# Patient Record
Sex: FEMALE | Race: WHITE | NOT HISPANIC OR LATINO | Employment: OTHER
[De-identification: names, ages, dates, MRNs, and addresses within clinical notes are randomized per-mention and may not be internally consistent; named-entity substitution may affect disease eponyms.]

---

## 2017-01-31 ENCOUNTER — RECORDS - HEALTHEAST (OUTPATIENT)
Dept: ADMINISTRATIVE | Facility: OTHER | Age: 72
End: 2017-01-31

## 2017-02-08 ENCOUNTER — HOSPITAL ENCOUNTER (OUTPATIENT)
Dept: MAMMOGRAPHY | Facility: HOSPITAL | Age: 72
Discharge: HOME | End: 2017-02-08
Attending: FAMILY MEDICINE

## 2017-02-08 DIAGNOSIS — Z12.31 VISIT FOR SCREENING MAMMOGRAM: ICD-10-CM

## 2017-02-09 ENCOUNTER — COMMUNICATION - HEALTHEAST (OUTPATIENT)
Dept: FAMILY MEDICINE | Facility: CLINIC | Age: 72
End: 2017-02-09

## 2017-02-20 ENCOUNTER — COMMUNICATION - HEALTHEAST (OUTPATIENT)
Dept: FAMILY MEDICINE | Facility: CLINIC | Age: 72
End: 2017-02-20

## 2017-03-06 ENCOUNTER — COMMUNICATION - HEALTHEAST (OUTPATIENT)
Dept: FAMILY MEDICINE | Facility: CLINIC | Age: 72
End: 2017-03-06

## 2017-03-06 DIAGNOSIS — Z78.0 ASYMPTOMATIC POSTMENOPAUSAL STATUS: ICD-10-CM

## 2017-03-13 ENCOUNTER — OFFICE VISIT - HEALTHEAST (OUTPATIENT)
Dept: FAMILY MEDICINE | Facility: CLINIC | Age: 72
End: 2017-03-13

## 2017-03-13 DIAGNOSIS — N95.2 POSTMENOPAUSAL ATROPHIC VAGINITIS: ICD-10-CM

## 2017-03-13 DIAGNOSIS — R42 LIGHTHEADEDNESS: ICD-10-CM

## 2017-03-13 DIAGNOSIS — R07.9 CHEST PAIN: ICD-10-CM

## 2017-03-13 DIAGNOSIS — R30.0 BURNING WITH URINATION: ICD-10-CM

## 2017-03-13 LAB
ATRIAL RATE - MUSE: 86 BPM
DIASTOLIC BLOOD PRESSURE - MUSE: NORMAL MMHG
INTERPRETATION ECG - MUSE: NORMAL
P AXIS - MUSE: 31 DEGREES
PR INTERVAL - MUSE: 172 MS
QRS DURATION - MUSE: 92 MS
QT - MUSE: 380 MS
QTC - MUSE: 454 MS
R AXIS - MUSE: 14 DEGREES
SYSTOLIC BLOOD PRESSURE - MUSE: NORMAL MMHG
T AXIS - MUSE: 74 DEGREES
VENTRICULAR RATE- MUSE: 86 BPM

## 2017-03-13 ASSESSMENT — MIFFLIN-ST. JEOR: SCORE: 1389.93

## 2017-03-16 ENCOUNTER — COMMUNICATION - HEALTHEAST (OUTPATIENT)
Dept: FAMILY MEDICINE | Facility: CLINIC | Age: 72
End: 2017-03-16

## 2017-04-04 ENCOUNTER — COMMUNICATION - HEALTHEAST (OUTPATIENT)
Dept: FAMILY MEDICINE | Facility: CLINIC | Age: 72
End: 2017-04-04

## 2017-04-04 DIAGNOSIS — I10 HYPERTENSION: ICD-10-CM

## 2017-04-05 ENCOUNTER — COMMUNICATION - HEALTHEAST (OUTPATIENT)
Dept: FAMILY MEDICINE | Facility: CLINIC | Age: 72
End: 2017-04-05

## 2017-05-30 ENCOUNTER — COMMUNICATION - HEALTHEAST (OUTPATIENT)
Dept: FAMILY MEDICINE | Facility: CLINIC | Age: 72
End: 2017-05-30

## 2017-05-30 DIAGNOSIS — I10 UNSPECIFIED ESSENTIAL HYPERTENSION: ICD-10-CM

## 2017-08-16 ENCOUNTER — OFFICE VISIT - HEALTHEAST (OUTPATIENT)
Dept: FAMILY MEDICINE | Facility: CLINIC | Age: 72
End: 2017-08-16

## 2017-08-16 DIAGNOSIS — L93.1 SUBACUTE CUTANEOUS LUPUS ERYTHEMATOSUS: ICD-10-CM

## 2017-08-16 DIAGNOSIS — E55.9 VITAMIN D DEFICIENCY: ICD-10-CM

## 2017-08-16 DIAGNOSIS — K21.9 GASTROESOPHAGEAL REFLUX DISEASE WITHOUT ESOPHAGITIS: ICD-10-CM

## 2017-08-16 DIAGNOSIS — Z00.00 HEALTHCARE MAINTENANCE: ICD-10-CM

## 2017-08-16 DIAGNOSIS — I10 ESSENTIAL HYPERTENSION WITH GOAL BLOOD PRESSURE LESS THAN 140/90: ICD-10-CM

## 2017-08-16 LAB
CHOLEST SERPL-MCNC: 149 MG/DL
FASTING STATUS PATIENT QL REPORTED: YES
HDLC SERPL-MCNC: 37 MG/DL
LDLC SERPL CALC-MCNC: 95 MG/DL
TRIGL SERPL-MCNC: 85 MG/DL

## 2017-08-16 ASSESSMENT — MIFFLIN-ST. JEOR: SCORE: 1400.81

## 2017-08-17 ENCOUNTER — COMMUNICATION - HEALTHEAST (OUTPATIENT)
Dept: FAMILY MEDICINE | Facility: CLINIC | Age: 72
End: 2017-08-17

## 2017-09-11 ENCOUNTER — COMMUNICATION - HEALTHEAST (OUTPATIENT)
Dept: FAMILY MEDICINE | Facility: CLINIC | Age: 72
End: 2017-09-11

## 2017-09-26 ENCOUNTER — COMMUNICATION - HEALTHEAST (OUTPATIENT)
Dept: FAMILY MEDICINE | Facility: CLINIC | Age: 72
End: 2017-09-26

## 2017-09-26 DIAGNOSIS — I10 HYPERTENSION: ICD-10-CM

## 2017-11-28 ENCOUNTER — COMMUNICATION - HEALTHEAST (OUTPATIENT)
Dept: FAMILY MEDICINE | Facility: CLINIC | Age: 72
End: 2017-11-28

## 2017-11-28 DIAGNOSIS — F41.1 ANXIETY STATE: ICD-10-CM

## 2018-02-13 ENCOUNTER — HOSPITAL ENCOUNTER (OUTPATIENT)
Dept: MAMMOGRAPHY | Facility: HOSPITAL | Age: 73
Discharge: HOME | End: 2018-02-13
Attending: FAMILY MEDICINE

## 2018-02-13 ENCOUNTER — COMMUNICATION - HEALTHEAST (OUTPATIENT)
Dept: FAMILY MEDICINE | Facility: CLINIC | Age: 73
End: 2018-02-13

## 2018-02-13 DIAGNOSIS — Z12.31 VISIT FOR SCREENING MAMMOGRAM: ICD-10-CM

## 2018-06-05 ENCOUNTER — COMMUNICATION - HEALTHEAST (OUTPATIENT)
Dept: FAMILY MEDICINE | Facility: CLINIC | Age: 73
End: 2018-06-05

## 2018-06-05 DIAGNOSIS — I10 ESSENTIAL HYPERTENSION: ICD-10-CM

## 2018-07-05 ENCOUNTER — COMMUNICATION - HEALTHEAST (OUTPATIENT)
Dept: FAMILY MEDICINE | Facility: CLINIC | Age: 73
End: 2018-07-05

## 2018-07-05 DIAGNOSIS — I10 HYPERTENSION: ICD-10-CM

## 2018-08-20 ENCOUNTER — OFFICE VISIT - HEALTHEAST (OUTPATIENT)
Dept: RHEUMATOLOGY | Facility: CLINIC | Age: 73
End: 2018-08-20

## 2018-08-20 DIAGNOSIS — M15.0 PRIMARY OSTEOARTHRITIS INVOLVING MULTIPLE JOINTS: ICD-10-CM

## 2018-08-20 DIAGNOSIS — D84.1 HYPOCOMPLEMENTEMIA (H): ICD-10-CM

## 2018-08-20 DIAGNOSIS — M35.01 SJOGREN'S SYNDROME WITH KERATOCONJUNCTIVITIS SICCA (H): ICD-10-CM

## 2018-08-20 DIAGNOSIS — R76.8 ANA POSITIVE: ICD-10-CM

## 2018-08-20 LAB
ALBUMIN SERPL-MCNC: 3.6 G/DL (ref 3.5–5)
ALT SERPL W P-5'-P-CCNC: 52 U/L (ref 0–45)
BASOPHILS # BLD AUTO: 0 THOU/UL (ref 0–0.2)
BASOPHILS NFR BLD AUTO: 0 % (ref 0–2)
C REACTIVE PROTEIN LHE: <0.1 MG/DL (ref 0–0.8)
C3 SERPL-MCNC: 45 MG/DL (ref 83–177)
C4 SERPL-MCNC: 15 MG/DL (ref 19–59)
CREAT SERPL-MCNC: 0.8 MG/DL (ref 0.6–1.1)
EOSINOPHIL # BLD AUTO: 0.1 THOU/UL (ref 0–0.4)
EOSINOPHIL NFR BLD AUTO: 1 % (ref 0–6)
ERYTHROCYTE [DISTWIDTH] IN BLOOD BY AUTOMATED COUNT: 12.8 % (ref 11–14.5)
ERYTHROCYTE [SEDIMENTATION RATE] IN BLOOD BY WESTERGREN METHOD: 33 MM/HR (ref 0–20)
GFR SERPL CREATININE-BSD FRML MDRD: >60 ML/MIN/1.73M2
HCT VFR BLD AUTO: 36.1 % (ref 35–47)
HGB BLD-MCNC: 12.4 G/DL (ref 12–16)
LYMPHOCYTES # BLD AUTO: 1.1 THOU/UL (ref 0.8–4.4)
LYMPHOCYTES NFR BLD AUTO: 29 % (ref 20–40)
MCH RBC QN AUTO: 30 PG (ref 27–34)
MCHC RBC AUTO-ENTMCNC: 34.4 G/DL (ref 32–36)
MCV RBC AUTO: 87 FL (ref 80–100)
MONOCYTES # BLD AUTO: 0.3 THOU/UL (ref 0–0.9)
MONOCYTES NFR BLD AUTO: 9 % (ref 2–10)
NEUTROPHILS # BLD AUTO: 2.3 THOU/UL (ref 2–7.7)
NEUTROPHILS NFR BLD AUTO: 61 % (ref 50–70)
PLATELET # BLD AUTO: 223 THOU/UL (ref 140–440)
PMV BLD AUTO: 6.5 FL (ref 7–10)
RBC # BLD AUTO: 4.14 MILL/UL (ref 3.8–5.4)
WBC: 3.7 THOU/UL (ref 4–11)

## 2018-08-21 ENCOUNTER — OFFICE VISIT - HEALTHEAST (OUTPATIENT)
Dept: FAMILY MEDICINE | Facility: CLINIC | Age: 73
End: 2018-08-21

## 2018-08-21 DIAGNOSIS — F41.1 ANXIETY STATE: ICD-10-CM

## 2018-08-21 DIAGNOSIS — B37.2 YEAST DERMATITIS: ICD-10-CM

## 2018-08-21 LAB
CCP AB SER IA-ACNC: <0.5 U/ML
DNA (DS) ANTIBODY - HISTORICAL: 7 IU
HBV SURFACE AG SERPL QL IA: NEGATIVE
HCV AB SERPL QL IA: NEGATIVE

## 2018-08-21 ASSESSMENT — MIFFLIN-ST. JEOR: SCORE: 1399

## 2018-10-03 ENCOUNTER — COMMUNICATION - HEALTHEAST (OUTPATIENT)
Dept: FAMILY MEDICINE | Facility: CLINIC | Age: 73
End: 2018-10-03

## 2018-10-03 DIAGNOSIS — I10 HYPERTENSION: ICD-10-CM

## 2018-10-04 ENCOUNTER — COMMUNICATION - HEALTHEAST (OUTPATIENT)
Dept: FAMILY MEDICINE | Facility: CLINIC | Age: 73
End: 2018-10-04

## 2018-10-24 ENCOUNTER — OFFICE VISIT - HEALTHEAST (OUTPATIENT)
Dept: RHEUMATOLOGY | Facility: CLINIC | Age: 73
End: 2018-10-24

## 2018-10-24 DIAGNOSIS — M35.01 SJOGREN'S SYNDROME WITH KERATOCONJUNCTIVITIS SICCA (H): ICD-10-CM

## 2018-10-24 DIAGNOSIS — M15.0 PRIMARY OSTEOARTHRITIS INVOLVING MULTIPLE JOINTS: ICD-10-CM

## 2018-10-24 DIAGNOSIS — M19.049 LOCALIZED PRIMARY CARPOMETACARPAL OSTEOARTHROSIS, UNSPECIFIED LATERALITY: ICD-10-CM

## 2018-10-24 DIAGNOSIS — R76.8 ANA POSITIVE: ICD-10-CM

## 2018-10-24 ASSESSMENT — MIFFLIN-ST. JEOR: SCORE: 1394.46

## 2019-02-06 ENCOUNTER — OFFICE VISIT - HEALTHEAST (OUTPATIENT)
Dept: RHEUMATOLOGY | Facility: CLINIC | Age: 74
End: 2019-02-06

## 2019-02-06 DIAGNOSIS — M35.01 SJOGREN'S SYNDROME WITH KERATOCONJUNCTIVITIS SICCA (H): ICD-10-CM

## 2019-02-06 DIAGNOSIS — M15.0 PRIMARY OSTEOARTHRITIS INVOLVING MULTIPLE JOINTS: ICD-10-CM

## 2019-02-06 DIAGNOSIS — D84.1 HYPOCOMPLEMENTEMIA (H): ICD-10-CM

## 2019-02-06 DIAGNOSIS — R76.8 ANA POSITIVE: ICD-10-CM

## 2019-04-05 ENCOUNTER — HOSPITAL ENCOUNTER (OUTPATIENT)
Dept: MAMMOGRAPHY | Facility: CLINIC | Age: 74
Discharge: HOME | End: 2019-04-05
Attending: FAMILY MEDICINE

## 2019-04-05 DIAGNOSIS — Z12.31 VISIT FOR SCREENING MAMMOGRAM: ICD-10-CM

## 2019-05-07 ENCOUNTER — OFFICE VISIT - HEALTHEAST (OUTPATIENT)
Dept: RHEUMATOLOGY | Facility: CLINIC | Age: 74
End: 2019-05-07

## 2019-05-07 DIAGNOSIS — M71.9 DISORDER OF BURSAE AND TENDONS IN SHOULDER REGION: ICD-10-CM

## 2019-05-07 DIAGNOSIS — M35.01 SJOGREN'S SYNDROME WITH KERATOCONJUNCTIVITIS SICCA (H): ICD-10-CM

## 2019-05-07 DIAGNOSIS — M67.919 DISORDER OF BURSAE AND TENDONS IN SHOULDER REGION: ICD-10-CM

## 2019-05-07 DIAGNOSIS — R76.8 ANA POSITIVE: ICD-10-CM

## 2019-05-07 DIAGNOSIS — M15.0 PRIMARY OSTEOARTHRITIS INVOLVING MULTIPLE JOINTS: ICD-10-CM

## 2019-05-28 ENCOUNTER — COMMUNICATION - HEALTHEAST (OUTPATIENT)
Dept: FAMILY MEDICINE | Facility: CLINIC | Age: 74
End: 2019-05-28

## 2019-05-28 DIAGNOSIS — I10 ESSENTIAL HYPERTENSION: ICD-10-CM

## 2019-07-15 ENCOUNTER — COMMUNICATION - HEALTHEAST (OUTPATIENT)
Dept: RHEUMATOLOGY | Facility: CLINIC | Age: 74
End: 2019-07-15

## 2019-07-15 DIAGNOSIS — M35.01 SJOGREN'S SYNDROME WITH KERATOCONJUNCTIVITIS SICCA (H): ICD-10-CM

## 2019-07-27 ENCOUNTER — COMMUNICATION - HEALTHEAST (OUTPATIENT)
Dept: RHEUMATOLOGY | Facility: CLINIC | Age: 74
End: 2019-07-27

## 2019-07-27 DIAGNOSIS — M35.01 SJOGREN'S SYNDROME WITH KERATOCONJUNCTIVITIS SICCA (H): ICD-10-CM

## 2019-07-29 ENCOUNTER — COMMUNICATION - HEALTHEAST (OUTPATIENT)
Dept: SCHEDULING | Facility: CLINIC | Age: 74
End: 2019-07-29

## 2019-07-30 ENCOUNTER — AMBULATORY - HEALTHEAST (OUTPATIENT)
Dept: FAMILY MEDICINE | Facility: CLINIC | Age: 74
End: 2019-07-30

## 2019-07-30 ENCOUNTER — OFFICE VISIT - HEALTHEAST (OUTPATIENT)
Dept: FAMILY MEDICINE | Facility: CLINIC | Age: 74
End: 2019-07-30

## 2019-07-30 DIAGNOSIS — R07.9 CHEST PAIN: ICD-10-CM

## 2019-07-30 DIAGNOSIS — R06.09 OTHER FORMS OF DYSPNEA: ICD-10-CM

## 2019-07-30 DIAGNOSIS — R07.89 ATYPICAL CHEST PAIN: ICD-10-CM

## 2019-07-30 DIAGNOSIS — R06.09 DOE (DYSPNEA ON EXERTION): ICD-10-CM

## 2019-07-30 LAB
ALBUMIN SERPL-MCNC: 3.5 G/DL (ref 3.5–5)
ALP SERPL-CCNC: 90 U/L (ref 45–120)
ALT SERPL W P-5'-P-CCNC: 82 U/L (ref 0–45)
ANION GAP SERPL CALCULATED.3IONS-SCNC: 7 MMOL/L (ref 5–18)
AST SERPL W P-5'-P-CCNC: 85 U/L (ref 0–40)
ATRIAL RATE - MUSE: 86 BPM
BILIRUB SERPL-MCNC: 0.6 MG/DL (ref 0–1)
BUN SERPL-MCNC: 6 MG/DL (ref 8–28)
CALCIUM SERPL-MCNC: 9.1 MG/DL (ref 8.5–10.5)
CHLORIDE BLD-SCNC: 101 MMOL/L (ref 98–107)
CO2 SERPL-SCNC: 25 MMOL/L (ref 22–31)
CREAT SERPL-MCNC: 0.88 MG/DL (ref 0.6–1.1)
DIASTOLIC BLOOD PRESSURE - MUSE: NORMAL MMHG
ERYTHROCYTE [DISTWIDTH] IN BLOOD BY AUTOMATED COUNT: 13.4 % (ref 11–14.5)
GFR SERPL CREATININE-BSD FRML MDRD: >60 ML/MIN/1.73M2
GLUCOSE BLD-MCNC: 100 MG/DL (ref 70–125)
HCT VFR BLD AUTO: 38.3 % (ref 35–47)
HGB BLD-MCNC: 12.8 G/DL (ref 12–16)
INTERPRETATION ECG - MUSE: NORMAL
LIPASE SERPL-CCNC: 31 U/L (ref 0–52)
MCH RBC QN AUTO: 30.2 PG (ref 27–34)
MCHC RBC AUTO-ENTMCNC: 33.5 G/DL (ref 32–36)
MCV RBC AUTO: 90 FL (ref 80–100)
P AXIS - MUSE: 16 DEGREES
PLATELET # BLD AUTO: 219 THOU/UL (ref 140–440)
PMV BLD AUTO: 6.9 FL (ref 7–10)
POTASSIUM BLD-SCNC: 4.2 MMOL/L (ref 3.5–5)
PR INTERVAL - MUSE: 168 MS
PROT SERPL-MCNC: 7.6 G/DL (ref 6–8)
QRS DURATION - MUSE: 86 MS
QT - MUSE: 370 MS
QTC - MUSE: 442 MS
R AXIS - MUSE: 24 DEGREES
RBC # BLD AUTO: 4.25 MILL/UL (ref 3.8–5.4)
SODIUM SERPL-SCNC: 133 MMOL/L (ref 136–145)
SYSTOLIC BLOOD PRESSURE - MUSE: NORMAL MMHG
T AXIS - MUSE: 65 DEGREES
VENTRICULAR RATE- MUSE: 86 BPM
WBC: 4.7 THOU/UL (ref 4–11)

## 2019-08-02 ENCOUNTER — COMMUNICATION - HEALTHEAST (OUTPATIENT)
Dept: FAMILY MEDICINE | Facility: CLINIC | Age: 74
End: 2019-08-02

## 2019-08-13 ENCOUNTER — HOSPITAL ENCOUNTER (OUTPATIENT)
Dept: CARDIOLOGY | Facility: HOSPITAL | Age: 74
Discharge: HOME | End: 2019-08-13
Attending: FAMILY MEDICINE

## 2019-08-13 ENCOUNTER — AMBULATORY - HEALTHEAST (OUTPATIENT)
Dept: FAMILY MEDICINE | Facility: CLINIC | Age: 74
End: 2019-08-13

## 2019-08-13 ENCOUNTER — HOSPITAL ENCOUNTER (OUTPATIENT)
Dept: NUCLEAR MEDICINE | Facility: HOSPITAL | Age: 74
Discharge: HOME | End: 2019-08-13
Attending: FAMILY MEDICINE

## 2019-08-13 DIAGNOSIS — R07.89 ATYPICAL CHEST PAIN: ICD-10-CM

## 2019-08-13 DIAGNOSIS — R94.39 ABNORMAL STRESS TEST: ICD-10-CM

## 2019-08-13 DIAGNOSIS — R06.09 DOE (DYSPNEA ON EXERTION): ICD-10-CM

## 2019-08-13 DIAGNOSIS — R06.09 OTHER FORMS OF DYSPNEA: ICD-10-CM

## 2019-08-13 DIAGNOSIS — R07.9 CHEST PAIN: ICD-10-CM

## 2019-08-13 LAB
CV STRESS CURRENT BP HE: NORMAL
CV STRESS CURRENT HR HE: 102
CV STRESS CURRENT HR HE: 102
CV STRESS CURRENT HR HE: 103
CV STRESS CURRENT HR HE: 104
CV STRESS CURRENT HR HE: 109
CV STRESS CURRENT HR HE: 110
CV STRESS CURRENT HR HE: 110
CV STRESS CURRENT HR HE: 115
CV STRESS CURRENT HR HE: 115
CV STRESS CURRENT HR HE: 118
CV STRESS CURRENT HR HE: 120
CV STRESS CURRENT HR HE: 120
CV STRESS CURRENT HR HE: 91
CV STRESS CURRENT HR HE: 94
CV STRESS CURRENT HR HE: 95
CV STRESS CURRENT HR HE: 96
CV STRESS CURRENT HR HE: 98
CV STRESS DEVIATION TIME HE: NORMAL
CV STRESS ECHO PERCENT HR HE: NORMAL
CV STRESS EXERCISE STAGE HE: NORMAL
CV STRESS FINAL RESTING BP HE: NORMAL
CV STRESS FINAL RESTING HR HE: 94
CV STRESS MAX HR HE: 121
CV STRESS MAX TREADMILL GRADE HE: 0
CV STRESS MAX TREADMILL SPEED HE: 0
CV STRESS PEAK DIA BP HE: NORMAL
CV STRESS PEAK SYS BP HE: NORMAL
CV STRESS PHASE HE: NORMAL
CV STRESS PROTOCOL HE: NORMAL
CV STRESS RESTING PT POSITION HE: NORMAL
CV STRESS ST DEVIATION AMOUNT HE: NORMAL
CV STRESS ST DEVIATION ELEVATION HE: NORMAL
CV STRESS ST EVELATION AMOUNT HE: NORMAL
CV STRESS TEST TYPE HE: NORMAL
CV STRESS TOTAL STAGE TIME MIN 1 HE: NORMAL
NUC STRESS EJECTION FRACTION: 72 %
STRESS ECHO BASELINE BP: NORMAL
STRESS ECHO BASELINE HR: 90
STRESS ECHO CALCULATED PERCENT HR: 82 %
STRESS ECHO LAST STRESS BP: NORMAL
STRESS ECHO LAST STRESS HR: 115

## 2019-08-15 ENCOUNTER — AMBULATORY - HEALTHEAST (OUTPATIENT)
Dept: CARDIOLOGY | Facility: CLINIC | Age: 74
End: 2019-08-15

## 2019-08-15 ENCOUNTER — OFFICE VISIT - HEALTHEAST (OUTPATIENT)
Dept: CARDIOLOGY | Facility: CLINIC | Age: 74
End: 2019-08-15

## 2019-08-15 DIAGNOSIS — I10 ESSENTIAL HYPERTENSION: ICD-10-CM

## 2019-08-15 DIAGNOSIS — R07.9 CHEST PAIN, UNSPECIFIED TYPE: ICD-10-CM

## 2019-08-15 DIAGNOSIS — R94.39 ABNORMAL NUCLEAR STRESS TEST: ICD-10-CM

## 2019-08-15 ASSESSMENT — MIFFLIN-ST. JEOR: SCORE: 1412.61

## 2019-08-19 ENCOUNTER — SURGERY - HEALTHEAST (OUTPATIENT)
Dept: CARDIOLOGY | Facility: CLINIC | Age: 74
End: 2019-08-19

## 2019-08-22 ENCOUNTER — SURGERY - HEALTHEAST (OUTPATIENT)
Dept: CARDIOLOGY | Facility: CLINIC | Age: 74
End: 2019-08-22

## 2019-08-22 ASSESSMENT — MIFFLIN-ST. JEOR: SCORE: 1413.97

## 2019-08-24 ENCOUNTER — COMMUNICATION - HEALTHEAST (OUTPATIENT)
Dept: FAMILY MEDICINE | Facility: CLINIC | Age: 74
End: 2019-08-24

## 2019-08-24 DIAGNOSIS — F41.1 ANXIETY STATE: ICD-10-CM

## 2019-09-07 ENCOUNTER — COMMUNICATION - HEALTHEAST (OUTPATIENT)
Dept: FAMILY MEDICINE | Facility: CLINIC | Age: 74
End: 2019-09-07

## 2019-09-07 DIAGNOSIS — I10 ESSENTIAL HYPERTENSION: ICD-10-CM

## 2019-09-23 ENCOUNTER — OFFICE VISIT - HEALTHEAST (OUTPATIENT)
Dept: CARDIOLOGY | Facility: CLINIC | Age: 74
End: 2019-09-23

## 2019-09-23 DIAGNOSIS — R94.39 ABNORMAL NUCLEAR STRESS TEST: ICD-10-CM

## 2019-09-23 DIAGNOSIS — I10 ESSENTIAL HYPERTENSION: ICD-10-CM

## 2019-09-23 DIAGNOSIS — I25.83 CORONARY ARTERY DISEASE DUE TO LIPID RICH PLAQUE: ICD-10-CM

## 2019-09-23 DIAGNOSIS — I25.10 CORONARY ARTERY DISEASE DUE TO LIPID RICH PLAQUE: ICD-10-CM

## 2019-09-23 ASSESSMENT — MIFFLIN-ST. JEOR: SCORE: 1421.68

## 2019-09-24 ENCOUNTER — COMMUNICATION - HEALTHEAST (OUTPATIENT)
Dept: FAMILY MEDICINE | Facility: CLINIC | Age: 74
End: 2019-09-24

## 2019-09-24 DIAGNOSIS — I10 HYPERTENSION: ICD-10-CM

## 2019-10-15 ENCOUNTER — COMMUNICATION - HEALTHEAST (OUTPATIENT)
Dept: RHEUMATOLOGY | Facility: CLINIC | Age: 74
End: 2019-10-15

## 2019-10-15 DIAGNOSIS — M35.01 SJOGREN'S SYNDROME WITH KERATOCONJUNCTIVITIS SICCA (H): ICD-10-CM

## 2019-12-09 ENCOUNTER — COMMUNICATION - HEALTHEAST (OUTPATIENT)
Dept: ADMINISTRATIVE | Facility: CLINIC | Age: 74
End: 2019-12-09

## 2019-12-09 DIAGNOSIS — M35.01 SJOGREN'S SYNDROME WITH KERATOCONJUNCTIVITIS SICCA (H): ICD-10-CM

## 2020-02-06 ENCOUNTER — COMMUNICATION - HEALTHEAST (OUTPATIENT)
Dept: RHEUMATOLOGY | Facility: CLINIC | Age: 75
End: 2020-02-06

## 2020-02-06 ENCOUNTER — OFFICE VISIT - HEALTHEAST (OUTPATIENT)
Dept: RHEUMATOLOGY | Facility: CLINIC | Age: 75
End: 2020-02-06

## 2020-02-06 DIAGNOSIS — M15.0 PRIMARY OSTEOARTHRITIS INVOLVING MULTIPLE JOINTS: ICD-10-CM

## 2020-02-06 DIAGNOSIS — R76.8 ANA POSITIVE: ICD-10-CM

## 2020-02-06 DIAGNOSIS — M35.01 SJOGREN'S SYNDROME WITH KERATOCONJUNCTIVITIS SICCA (H): ICD-10-CM

## 2020-02-06 DIAGNOSIS — M65.321 TRIGGER FINGER, RIGHT INDEX FINGER: ICD-10-CM

## 2020-02-06 ASSESSMENT — MIFFLIN-ST. JEOR: SCORE: 1421.68

## 2020-02-11 ENCOUNTER — RECORDS - HEALTHEAST (OUTPATIENT)
Dept: ADMINISTRATIVE | Facility: OTHER | Age: 75
End: 2020-02-11

## 2020-03-11 ENCOUNTER — HEALTH MAINTENANCE LETTER (OUTPATIENT)
Age: 75
End: 2020-03-11

## 2020-04-10 ENCOUNTER — COMMUNICATION - HEALTHEAST (OUTPATIENT)
Dept: RHEUMATOLOGY | Facility: CLINIC | Age: 75
End: 2020-04-10

## 2020-04-10 DIAGNOSIS — M35.01 SJOGREN'S SYNDROME WITH KERATOCONJUNCTIVITIS SICCA (H): ICD-10-CM

## 2020-06-05 ENCOUNTER — COMMUNICATION - HEALTHEAST (OUTPATIENT)
Dept: RHEUMATOLOGY | Facility: CLINIC | Age: 75
End: 2020-06-05

## 2020-06-05 DIAGNOSIS — M35.01 SJOGREN'S SYNDROME WITH KERATOCONJUNCTIVITIS SICCA (H): ICD-10-CM

## 2020-08-04 ENCOUNTER — COMMUNICATION - HEALTHEAST (OUTPATIENT)
Dept: RHEUMATOLOGY | Facility: CLINIC | Age: 75
End: 2020-08-04

## 2020-08-04 DIAGNOSIS — M35.01 SJOGREN'S SYNDROME WITH KERATOCONJUNCTIVITIS SICCA (H): ICD-10-CM

## 2020-08-10 ENCOUNTER — OFFICE VISIT - HEALTHEAST (OUTPATIENT)
Dept: FAMILY MEDICINE | Facility: CLINIC | Age: 75
End: 2020-08-10

## 2020-08-10 DIAGNOSIS — M35.01 SJOGREN'S SYNDROME WITH KERATOCONJUNCTIVITIS SICCA (H): ICD-10-CM

## 2020-08-10 DIAGNOSIS — R10.13 EPIGASTRIC ABDOMINAL PAIN: ICD-10-CM

## 2020-08-10 LAB
ALBUMIN SERPL-MCNC: 3.4 G/DL (ref 3.5–5)
ALP SERPL-CCNC: 87 U/L (ref 45–120)
ALT SERPL W P-5'-P-CCNC: 113 U/L (ref 0–45)
ANION GAP SERPL CALCULATED.3IONS-SCNC: 8 MMOL/L (ref 5–18)
AST SERPL W P-5'-P-CCNC: 124 U/L (ref 0–40)
BASOPHILS # BLD AUTO: 0 THOU/UL (ref 0–0.2)
BASOPHILS NFR BLD AUTO: 1 % (ref 0–2)
BILIRUB SERPL-MCNC: 1 MG/DL (ref 0–1)
BUN SERPL-MCNC: 5 MG/DL (ref 8–28)
CALCIUM SERPL-MCNC: 8.7 MG/DL (ref 8.5–10.5)
CHLORIDE BLD-SCNC: 100 MMOL/L (ref 98–107)
CO2 SERPL-SCNC: 24 MMOL/L (ref 22–31)
CREAT SERPL-MCNC: 0.84 MG/DL (ref 0.6–1.1)
EOSINOPHIL # BLD AUTO: 0.1 THOU/UL (ref 0–0.4)
EOSINOPHIL NFR BLD AUTO: 2 % (ref 0–6)
ERYTHROCYTE [DISTWIDTH] IN BLOOD BY AUTOMATED COUNT: 11.8 % (ref 11–14.5)
GFR SERPL CREATININE-BSD FRML MDRD: >60 ML/MIN/1.73M2
GLUCOSE BLD-MCNC: 91 MG/DL (ref 70–125)
HCT VFR BLD AUTO: 38.2 % (ref 35–47)
HGB BLD-MCNC: 12.8 G/DL (ref 12–16)
LIPASE SERPL-CCNC: 44 U/L (ref 0–52)
LYMPHOCYTES # BLD AUTO: 1.2 THOU/UL (ref 0.8–4.4)
LYMPHOCYTES NFR BLD AUTO: 28 % (ref 20–40)
MCH RBC QN AUTO: 30.7 PG (ref 27–34)
MCHC RBC AUTO-ENTMCNC: 33.5 G/DL (ref 32–36)
MCV RBC AUTO: 91 FL (ref 80–100)
MONOCYTES # BLD AUTO: 0.4 THOU/UL (ref 0–0.9)
MONOCYTES NFR BLD AUTO: 10 % (ref 2–10)
NEUTROPHILS # BLD AUTO: 2.5 THOU/UL (ref 2–7.7)
NEUTROPHILS NFR BLD AUTO: 59 % (ref 50–70)
PLATELET # BLD AUTO: 202 THOU/UL (ref 140–440)
PMV BLD AUTO: 6.5 FL (ref 7–10)
POTASSIUM BLD-SCNC: 3.8 MMOL/L (ref 3.5–5)
PROT SERPL-MCNC: 7.8 G/DL (ref 6–8)
RBC # BLD AUTO: 4.17 MILL/UL (ref 3.8–5.4)
SODIUM SERPL-SCNC: 132 MMOL/L (ref 136–145)
WBC: 4.2 THOU/UL (ref 4–11)

## 2020-08-18 ENCOUNTER — COMMUNICATION - HEALTHEAST (OUTPATIENT)
Dept: LAB | Facility: CLINIC | Age: 75
End: 2020-08-18

## 2020-08-18 DIAGNOSIS — R79.89 ELEVATED LFTS: ICD-10-CM

## 2020-08-23 ENCOUNTER — COMMUNICATION - HEALTHEAST (OUTPATIENT)
Dept: FAMILY MEDICINE | Facility: CLINIC | Age: 75
End: 2020-08-23

## 2020-08-23 DIAGNOSIS — F41.1 ANXIETY STATE: ICD-10-CM

## 2020-08-24 ENCOUNTER — AMBULATORY - HEALTHEAST (OUTPATIENT)
Dept: LAB | Facility: CLINIC | Age: 75
End: 2020-08-24

## 2020-08-24 DIAGNOSIS — R79.89 ELEVATED LFTS: ICD-10-CM

## 2020-08-24 LAB
ALBUMIN SERPL-MCNC: 3.3 G/DL (ref 3.5–5)
ALP SERPL-CCNC: 81 U/L (ref 45–120)
ALT SERPL W P-5'-P-CCNC: 80 U/L (ref 0–45)
ANION GAP SERPL CALCULATED.3IONS-SCNC: 7 MMOL/L (ref 5–18)
AST SERPL W P-5'-P-CCNC: 96 U/L (ref 0–40)
BILIRUB SERPL-MCNC: 0.9 MG/DL (ref 0–1)
BUN SERPL-MCNC: 5 MG/DL (ref 8–28)
CALCIUM SERPL-MCNC: 8.4 MG/DL (ref 8.5–10.5)
CHLORIDE BLD-SCNC: 104 MMOL/L (ref 98–107)
CO2 SERPL-SCNC: 25 MMOL/L (ref 22–31)
CREAT SERPL-MCNC: 0.86 MG/DL (ref 0.6–1.1)
GFR SERPL CREATININE-BSD FRML MDRD: >60 ML/MIN/1.73M2
GLUCOSE BLD-MCNC: 120 MG/DL (ref 70–125)
POTASSIUM BLD-SCNC: 3.8 MMOL/L (ref 3.5–5)
PROT SERPL-MCNC: 7.6 G/DL (ref 6–8)
SODIUM SERPL-SCNC: 136 MMOL/L (ref 136–145)

## 2020-08-25 RX ORDER — CITALOPRAM HYDROBROMIDE 20 MG/1
TABLET ORAL
Qty: 135 TABLET | Refills: 3 | Status: SHIPPED | OUTPATIENT
Start: 2020-08-25 | End: 2021-10-27

## 2020-08-30 ENCOUNTER — COMMUNICATION - HEALTHEAST (OUTPATIENT)
Dept: FAMILY MEDICINE | Facility: CLINIC | Age: 75
End: 2020-08-30

## 2020-08-30 DIAGNOSIS — I10 ESSENTIAL HYPERTENSION: ICD-10-CM

## 2020-09-20 ENCOUNTER — COMMUNICATION - HEALTHEAST (OUTPATIENT)
Dept: FAMILY MEDICINE | Facility: CLINIC | Age: 75
End: 2020-09-20

## 2020-09-20 DIAGNOSIS — I10 HYPERTENSION: ICD-10-CM

## 2020-09-21 RX ORDER — LOSARTAN POTASSIUM 100 MG/1
TABLET ORAL
Qty: 90 TABLET | Refills: 3 | Status: SHIPPED | OUTPATIENT
Start: 2020-09-21 | End: 2021-07-20

## 2020-09-29 ENCOUNTER — COMMUNICATION - HEALTHEAST (OUTPATIENT)
Dept: FAMILY MEDICINE | Facility: CLINIC | Age: 75
End: 2020-09-29

## 2020-09-29 DIAGNOSIS — R10.13 EPIGASTRIC ABDOMINAL PAIN: ICD-10-CM

## 2020-09-30 ENCOUNTER — COMMUNICATION - HEALTHEAST (OUTPATIENT)
Dept: RHEUMATOLOGY | Facility: CLINIC | Age: 75
End: 2020-09-30

## 2020-09-30 DIAGNOSIS — M35.01 SJOGREN'S SYNDROME WITH KERATOCONJUNCTIVITIS SICCA (H): ICD-10-CM

## 2020-10-01 ENCOUNTER — COMMUNICATION - HEALTHEAST (OUTPATIENT)
Dept: FAMILY MEDICINE | Facility: CLINIC | Age: 75
End: 2020-10-01

## 2020-10-09 ENCOUNTER — RECORDS - HEALTHEAST (OUTPATIENT)
Dept: ADMINISTRATIVE | Facility: OTHER | Age: 75
End: 2020-10-09

## 2020-10-20 ENCOUNTER — OFFICE VISIT - HEALTHEAST (OUTPATIENT)
Dept: RHEUMATOLOGY | Facility: CLINIC | Age: 75
End: 2020-10-20

## 2020-10-20 DIAGNOSIS — Z78.0 POST-MENOPAUSAL: ICD-10-CM

## 2020-10-20 DIAGNOSIS — R74.01 TRANSAMINITIS: ICD-10-CM

## 2020-10-20 DIAGNOSIS — M35.01 SJOGREN'S SYNDROME WITH KERATOCONJUNCTIVITIS SICCA (H): ICD-10-CM

## 2020-10-20 DIAGNOSIS — M79.641 CHRONIC PAIN OF RIGHT HAND: ICD-10-CM

## 2020-10-20 DIAGNOSIS — G89.29 CHRONIC PAIN OF RIGHT HAND: ICD-10-CM

## 2020-10-21 ENCOUNTER — AMBULATORY - HEALTHEAST (OUTPATIENT)
Dept: LAB | Facility: HOSPITAL | Age: 75
End: 2020-10-21

## 2020-10-21 DIAGNOSIS — M79.641 CHRONIC PAIN OF RIGHT HAND: ICD-10-CM

## 2020-10-21 DIAGNOSIS — R74.01 TRANSAMINITIS: ICD-10-CM

## 2020-10-21 DIAGNOSIS — M35.01 SJOGREN'S SYNDROME WITH KERATOCONJUNCTIVITIS SICCA (H): ICD-10-CM

## 2020-10-21 DIAGNOSIS — G89.29 CHRONIC PAIN OF RIGHT HAND: ICD-10-CM

## 2020-10-21 LAB
ALBUMIN SERPL-MCNC: 3.4 G/DL (ref 3.5–5)
ALT SERPL W P-5'-P-CCNC: 104 U/L (ref 0–45)
AST SERPL W P-5'-P-CCNC: 128 U/L (ref 0–40)
BASOPHILS # BLD AUTO: 0 THOU/UL (ref 0–0.2)
BASOPHILS NFR BLD AUTO: 1 % (ref 0–2)
C REACTIVE PROTEIN LHE: <0.1 MG/DL (ref 0–0.8)
C3 SERPL-MCNC: 57 MG/DL (ref 83–177)
C4 SERPL-MCNC: 16 MG/DL (ref 19–59)
CREAT SERPL-MCNC: 0.82 MG/DL (ref 0.6–1.1)
CREAT UR-MCNC: 56.4 MG/DL
EOSINOPHIL # BLD AUTO: 0.1 THOU/UL (ref 0–0.4)
EOSINOPHIL NFR BLD AUTO: 1 % (ref 0–6)
ERYTHROCYTE [DISTWIDTH] IN BLOOD BY AUTOMATED COUNT: 13.5 % (ref 11–14.5)
ERYTHROCYTE [SEDIMENTATION RATE] IN BLOOD BY WESTERGREN METHOD: 30 MM/HR (ref 0–20)
GFR SERPL CREATININE-BSD FRML MDRD: >60 ML/MIN/1.73M2
HCT VFR BLD AUTO: 37.6 % (ref 35–47)
HGB BLD-MCNC: 12.6 G/DL (ref 12–16)
IMM GRANULOCYTES # BLD: 0 THOU/UL
IMM GRANULOCYTES NFR BLD: 0 %
LYMPHOCYTES # BLD AUTO: 1.4 THOU/UL (ref 0.8–4.4)
LYMPHOCYTES NFR BLD AUTO: 25 % (ref 20–40)
MCH RBC QN AUTO: 30.9 PG (ref 27–34)
MCHC RBC AUTO-ENTMCNC: 33.5 G/DL (ref 32–36)
MCV RBC AUTO: 92 FL (ref 80–100)
MICROALBUMIN UR-MCNC: 1.18 MG/DL (ref 0–1.99)
MICROALBUMIN/CREAT UR: 20.9 MG/G
MONOCYTES # BLD AUTO: 0.5 THOU/UL (ref 0–0.9)
MONOCYTES NFR BLD AUTO: 10 % (ref 2–10)
NEUTROPHILS # BLD AUTO: 3.4 THOU/UL (ref 2–7.7)
NEUTROPHILS NFR BLD AUTO: 63 % (ref 50–70)
PLATELET # BLD AUTO: 197 THOU/UL (ref 140–440)
PMV BLD AUTO: 8.4 FL (ref 8.5–12.5)
RBC # BLD AUTO: 4.08 MILL/UL (ref 3.8–5.4)
URATE SERPL-MCNC: 3.3 MG/DL (ref 2–7.5)
WBC: 5.4 THOU/UL (ref 4–11)

## 2020-10-22 ENCOUNTER — TRANSFERRED RECORDS (OUTPATIENT)
Dept: HEALTH INFORMATION MANAGEMENT | Facility: CLINIC | Age: 75
End: 2020-10-22

## 2020-10-22 ENCOUNTER — RECORDS - HEALTHEAST (OUTPATIENT)
Dept: ADMINISTRATIVE | Facility: OTHER | Age: 75
End: 2020-10-22

## 2020-10-30 ENCOUNTER — TRANSFERRED RECORDS (OUTPATIENT)
Dept: HEALTH INFORMATION MANAGEMENT | Facility: CLINIC | Age: 75
End: 2020-10-30

## 2020-11-04 ENCOUNTER — RECORDS - HEALTHEAST (OUTPATIENT)
Dept: ADMINISTRATIVE | Facility: OTHER | Age: 75
End: 2020-11-04

## 2020-11-04 ENCOUNTER — COMMUNICATION - HEALTHEAST (OUTPATIENT)
Dept: RHEUMATOLOGY | Facility: CLINIC | Age: 75
End: 2020-11-04

## 2020-11-04 DIAGNOSIS — R79.89 ELEVATED LFTS: ICD-10-CM

## 2020-11-12 ENCOUNTER — HOSPITAL ENCOUNTER (OUTPATIENT)
Dept: RADIOLOGY | Facility: HOSPITAL | Age: 75
Discharge: HOME | End: 2020-11-12
Attending: INTERNAL MEDICINE

## 2020-11-12 DIAGNOSIS — Z98.84 BARIATRIC SURGERY STATUS: ICD-10-CM

## 2020-11-12 DIAGNOSIS — R11.2 NAUSEA AND VOMITING, INTRACTABILITY OF VOMITING NOT SPECIFIED, UNSPECIFIED VOMITING TYPE: ICD-10-CM

## 2020-11-12 DIAGNOSIS — R10.9 ABDOMINAL PAIN OF UNKNOWN CAUSE: ICD-10-CM

## 2020-11-16 ENCOUNTER — OFFICE VISIT - HEALTHEAST (OUTPATIENT)
Dept: OCCUPATIONAL THERAPY | Facility: REHABILITATION | Age: 75
End: 2020-11-16

## 2020-11-16 DIAGNOSIS — Z78.9 DECREASED ACTIVITIES OF DAILY LIVING (ADL): ICD-10-CM

## 2020-11-16 DIAGNOSIS — M25.641 FINGER STIFFNESS, RIGHT: ICD-10-CM

## 2020-11-16 DIAGNOSIS — M79.644 PAIN OF FINGER OF RIGHT HAND: ICD-10-CM

## 2020-11-16 DIAGNOSIS — R29.898 RIGHT HAND WEAKNESS: ICD-10-CM

## 2020-12-03 ENCOUNTER — AMBULATORY - HEALTHEAST (OUTPATIENT)
Dept: LAB | Facility: HOSPITAL | Age: 75
End: 2020-12-03

## 2020-12-03 DIAGNOSIS — R79.89 ELEVATED LFTS: ICD-10-CM

## 2020-12-03 LAB
ALBUMIN SERPL-MCNC: 3.4 G/DL (ref 3.5–5)
ALP SERPL-CCNC: 87 U/L (ref 45–120)
ALT SERPL W P-5'-P-CCNC: 86 U/L (ref 0–45)
AST SERPL W P-5'-P-CCNC: 112 U/L (ref 0–40)
BILIRUB DIRECT SERPL-MCNC: 0.4 MG/DL
BILIRUB SERPL-MCNC: 1.1 MG/DL (ref 0–1)
PROT SERPL-MCNC: 7.9 G/DL (ref 6–8)

## 2020-12-11 ENCOUNTER — OFFICE VISIT - HEALTHEAST (OUTPATIENT)
Dept: OCCUPATIONAL THERAPY | Facility: REHABILITATION | Age: 75
End: 2020-12-11

## 2020-12-11 DIAGNOSIS — M25.641 FINGER STIFFNESS, RIGHT: ICD-10-CM

## 2020-12-11 DIAGNOSIS — Z78.9 DECREASED ACTIVITIES OF DAILY LIVING (ADL): ICD-10-CM

## 2020-12-11 DIAGNOSIS — R29.898 RIGHT HAND WEAKNESS: ICD-10-CM

## 2020-12-11 DIAGNOSIS — M79.644 PAIN OF FINGER OF RIGHT HAND: ICD-10-CM

## 2020-12-21 ENCOUNTER — COMMUNICATION - HEALTHEAST (OUTPATIENT)
Dept: RHEUMATOLOGY | Facility: CLINIC | Age: 75
End: 2020-12-21

## 2020-12-22 RX ORDER — CEVIMELINE HYDROCHLORIDE 30 MG/1
1 CAPSULE ORAL DAILY
Status: SHIPPED | COMMUNITY
Start: 2020-10-09 | End: 2022-07-08

## 2020-12-23 ENCOUNTER — OFFICE VISIT - HEALTHEAST (OUTPATIENT)
Dept: RHEUMATOLOGY | Facility: CLINIC | Age: 75
End: 2020-12-23

## 2020-12-23 ENCOUNTER — COMMUNICATION - HEALTHEAST (OUTPATIENT)
Dept: RHEUMATOLOGY | Facility: CLINIC | Age: 75
End: 2020-12-23

## 2020-12-23 DIAGNOSIS — M35.01 SJOGREN'S SYNDROME WITH KERATOCONJUNCTIVITIS SICCA (H): ICD-10-CM

## 2020-12-23 DIAGNOSIS — G89.29 CHRONIC PAIN OF RIGHT HAND: ICD-10-CM

## 2020-12-23 DIAGNOSIS — R79.89 ELEVATED LFTS: ICD-10-CM

## 2020-12-23 DIAGNOSIS — M79.641 CHRONIC PAIN OF RIGHT HAND: ICD-10-CM

## 2020-12-23 DIAGNOSIS — M15.0 PRIMARY OSTEOARTHRITIS INVOLVING MULTIPLE JOINTS: ICD-10-CM

## 2021-01-03 ENCOUNTER — HEALTH MAINTENANCE LETTER (OUTPATIENT)
Age: 76
End: 2021-01-03

## 2021-01-06 ENCOUNTER — RECORDS - HEALTHEAST (OUTPATIENT)
Dept: ADMINISTRATIVE | Facility: OTHER | Age: 76
End: 2021-01-06

## 2021-01-20 ENCOUNTER — AMBULATORY - HEALTHEAST (OUTPATIENT)
Dept: NURSING | Facility: CLINIC | Age: 76
End: 2021-01-20

## 2021-01-20 ENCOUNTER — AMBULATORY - HEALTHEAST (OUTPATIENT)
Dept: LAB | Facility: HOSPITAL | Age: 76
End: 2021-01-20

## 2021-01-20 DIAGNOSIS — R79.89 ELEVATED LFTS: ICD-10-CM

## 2021-01-20 DIAGNOSIS — M35.01 SJOGREN'S SYNDROME WITH KERATOCONJUNCTIVITIS SICCA (H): ICD-10-CM

## 2021-01-20 LAB
ALBUMIN SERPL-MCNC: 3.5 G/DL (ref 3.5–5)
ALP SERPL-CCNC: 80 U/L (ref 45–120)
ALT SERPL W P-5'-P-CCNC: 90 U/L (ref 0–45)
AST SERPL W P-5'-P-CCNC: 96 U/L (ref 0–40)
BASOPHILS # BLD AUTO: 0 THOU/UL (ref 0–0.2)
BASOPHILS NFR BLD AUTO: 1 % (ref 0–2)
BILIRUB DIRECT SERPL-MCNC: 0.4 MG/DL
BILIRUB SERPL-MCNC: 1.1 MG/DL (ref 0–1)
CREAT SERPL-MCNC: 0.88 MG/DL (ref 0.6–1.1)
EOSINOPHIL # BLD AUTO: 0 THOU/UL (ref 0–0.4)
EOSINOPHIL NFR BLD AUTO: 1 % (ref 0–6)
ERYTHROCYTE [DISTWIDTH] IN BLOOD BY AUTOMATED COUNT: 12.7 % (ref 11–14.5)
GFR SERPL CREATININE-BSD FRML MDRD: >60 ML/MIN/1.73M2
HCT VFR BLD AUTO: 36.9 % (ref 35–47)
HGB BLD-MCNC: 12.4 G/DL (ref 12–16)
IMM GRANULOCYTES # BLD: 0 THOU/UL
IMM GRANULOCYTES NFR BLD: 0 %
LYMPHOCYTES # BLD AUTO: 1.2 THOU/UL (ref 0.8–4.4)
LYMPHOCYTES NFR BLD AUTO: 32 % (ref 20–40)
MCH RBC QN AUTO: 30.7 PG (ref 27–34)
MCHC RBC AUTO-ENTMCNC: 33.6 G/DL (ref 32–36)
MCV RBC AUTO: 91 FL (ref 80–100)
MONOCYTES # BLD AUTO: 0.4 THOU/UL (ref 0–0.9)
MONOCYTES NFR BLD AUTO: 10 % (ref 2–10)
NEUTROPHILS # BLD AUTO: 2.1 THOU/UL (ref 2–7.7)
NEUTROPHILS NFR BLD AUTO: 57 % (ref 50–70)
PLATELET # BLD AUTO: 178 THOU/UL (ref 140–440)
PMV BLD AUTO: 8.9 FL (ref 8.5–12.5)
PROT SERPL-MCNC: 8.2 G/DL (ref 6–8)
RBC # BLD AUTO: 4.04 MILL/UL (ref 3.8–5.4)
WBC: 3.8 THOU/UL (ref 4–11)

## 2021-02-03 ENCOUNTER — COMMUNICATION - HEALTHEAST (OUTPATIENT)
Dept: RHEUMATOLOGY | Facility: CLINIC | Age: 76
End: 2021-02-03

## 2021-02-03 DIAGNOSIS — R77.8 ELEVATED TOTAL PROTEIN: ICD-10-CM

## 2021-02-03 DIAGNOSIS — D72.9 ABNORMAL WBC COUNT: ICD-10-CM

## 2021-02-05 ENCOUNTER — AMBULATORY - HEALTHEAST (OUTPATIENT)
Dept: LAB | Facility: HOSPITAL | Age: 76
End: 2021-02-05

## 2021-02-05 DIAGNOSIS — D72.9 ABNORMAL WBC COUNT: ICD-10-CM

## 2021-02-05 DIAGNOSIS — R77.8 ELEVATED TOTAL PROTEIN: ICD-10-CM

## 2021-02-05 LAB
BASOPHILS # BLD AUTO: 0 THOU/UL (ref 0–0.2)
BASOPHILS NFR BLD AUTO: 0 % (ref 0–2)
EOSINOPHIL # BLD AUTO: 0 THOU/UL (ref 0–0.4)
EOSINOPHIL NFR BLD AUTO: 1 % (ref 0–6)
ERYTHROCYTE [DISTWIDTH] IN BLOOD BY AUTOMATED COUNT: 12.6 % (ref 11–14.5)
HCT VFR BLD AUTO: 36.2 % (ref 35–47)
HGB BLD-MCNC: 12.3 G/DL (ref 12–16)
IMM GRANULOCYTES # BLD: 0 THOU/UL
IMM GRANULOCYTES NFR BLD: 0 %
LYMPHOCYTES # BLD AUTO: 1.1 THOU/UL (ref 0.8–4.4)
LYMPHOCYTES NFR BLD AUTO: 23 % (ref 20–40)
MCH RBC QN AUTO: 30.9 PG (ref 27–34)
MCHC RBC AUTO-ENTMCNC: 34 G/DL (ref 32–36)
MCV RBC AUTO: 91 FL (ref 80–100)
MONOCYTES # BLD AUTO: 0.4 THOU/UL (ref 0–0.9)
MONOCYTES NFR BLD AUTO: 9 % (ref 2–10)
NEUTROPHILS # BLD AUTO: 3.1 THOU/UL (ref 2–7.7)
NEUTROPHILS NFR BLD AUTO: 67 % (ref 50–70)
PLATELET # BLD AUTO: 175 THOU/UL (ref 140–440)
PMV BLD AUTO: 8.8 FL (ref 8.5–12.5)
RBC # BLD AUTO: 3.98 MILL/UL (ref 3.8–5.4)
WBC: 4.7 THOU/UL (ref 4–11)

## 2021-02-10 ENCOUNTER — RECORDS - HEALTHEAST (OUTPATIENT)
Dept: FAMILY MEDICINE | Facility: CLINIC | Age: 76
End: 2021-02-10

## 2021-02-10 ENCOUNTER — AMBULATORY - HEALTHEAST (OUTPATIENT)
Dept: NURSING | Facility: CLINIC | Age: 76
End: 2021-02-10

## 2021-02-10 DIAGNOSIS — Z12.31 VISIT FOR SCREENING MAMMOGRAM: ICD-10-CM

## 2021-02-10 LAB
ALBUMIN PERCENT: 50.9 % (ref 51–67)
ALBUMIN SERPL ELPH-MCNC: 4 G/DL (ref 3.2–4.7)
ALPHA 1 PERCENT: 2 % (ref 2–4)
ALPHA 2 PERCENT: 10.9 % (ref 5–13)
ALPHA1 GLOB SERPL ELPH-MCNC: 0.2 G/DL (ref 0.1–0.3)
ALPHA2 GLOB SERPL ELPH-MCNC: 0.9 G/DL (ref 0.4–0.9)
B-GLOBULIN SERPL ELPH-MCNC: 0.8 G/DL (ref 0.7–1.2)
BETA PERCENT: 10.1 % (ref 10–17)
GAMMA GLOB SERPL ELPH-MCNC: 2 G/DL (ref 0.6–1.4)
GAMMA GLOBULIN PERCENT: 26.1 % (ref 9–20)
PATH ICD:: ABNORMAL
PATH ICD:: NORMAL
PROT PATTERN SERPL ELPH-IMP: ABNORMAL
PROT PATTERN SERPL ELPH-IMP: NORMAL
PROT SERPL-MCNC: 7.8 G/DL (ref 6–8)
REVIEWING PATHOLOGIST: ABNORMAL
REVIEWING PATHOLOGIST: NORMAL
TOTAL PROTEIN RANDOM URINE MG/DL: <7 MG/DL

## 2021-04-25 ENCOUNTER — HEALTH MAINTENANCE LETTER (OUTPATIENT)
Age: 76
End: 2021-04-25

## 2021-04-27 ENCOUNTER — OFFICE VISIT - HEALTHEAST (OUTPATIENT)
Dept: RHEUMATOLOGY | Facility: CLINIC | Age: 76
End: 2021-04-27

## 2021-04-27 DIAGNOSIS — M35.01 SJOGREN'S SYNDROME WITH KERATOCONJUNCTIVITIS SICCA (H): ICD-10-CM

## 2021-04-27 DIAGNOSIS — R74.01 TRANSAMINITIS: ICD-10-CM

## 2021-04-27 DIAGNOSIS — M15.0 PRIMARY OSTEOARTHRITIS INVOLVING MULTIPLE JOINTS: ICD-10-CM

## 2021-04-30 ENCOUNTER — OFFICE VISIT - HEALTHEAST (OUTPATIENT)
Dept: FAMILY MEDICINE | Facility: CLINIC | Age: 76
End: 2021-04-30

## 2021-04-30 DIAGNOSIS — I10 ESSENTIAL HYPERTENSION: ICD-10-CM

## 2021-04-30 DIAGNOSIS — E83.42 HYPOMAGNESEMIA: ICD-10-CM

## 2021-04-30 DIAGNOSIS — R79.89 ELEVATED LFTS: ICD-10-CM

## 2021-04-30 DIAGNOSIS — D64.9 ANEMIA, UNSPECIFIED TYPE: ICD-10-CM

## 2021-04-30 DIAGNOSIS — R55 PRE-SYNCOPE: ICD-10-CM

## 2021-04-30 DIAGNOSIS — E87.1 HYPONATREMIA: ICD-10-CM

## 2021-04-30 LAB
ALBUMIN SERPL-MCNC: 3.6 G/DL (ref 3.5–5)
ALP SERPL-CCNC: 99 U/L (ref 45–120)
ALT SERPL W P-5'-P-CCNC: 124 U/L (ref 0–45)
ANION GAP SERPL CALCULATED.3IONS-SCNC: 8 MMOL/L (ref 5–18)
AST SERPL W P-5'-P-CCNC: 135 U/L (ref 0–40)
BASOPHILS # BLD AUTO: 0 THOU/UL (ref 0–0.2)
BASOPHILS NFR BLD AUTO: 0 % (ref 0–2)
BILIRUB SERPL-MCNC: 1 MG/DL (ref 0–1)
BUN SERPL-MCNC: 8 MG/DL (ref 8–28)
CALCIUM SERPL-MCNC: 8.5 MG/DL (ref 8.5–10.5)
CHLORIDE BLD-SCNC: 99 MMOL/L (ref 98–107)
CO2 SERPL-SCNC: 24 MMOL/L (ref 22–31)
CREAT SERPL-MCNC: 0.84 MG/DL (ref 0.6–1.1)
EOSINOPHIL # BLD AUTO: 0 THOU/UL (ref 0–0.4)
EOSINOPHIL NFR BLD AUTO: 1 % (ref 0–6)
ERYTHROCYTE [DISTWIDTH] IN BLOOD BY AUTOMATED COUNT: 12.4 % (ref 11–14.5)
GFR SERPL CREATININE-BSD FRML MDRD: >60 ML/MIN/1.73M2
GLUCOSE BLD-MCNC: 96 MG/DL (ref 70–125)
HCT VFR BLD AUTO: 35.5 % (ref 35–47)
HGB BLD-MCNC: 12.2 G/DL (ref 12–16)
IMM GRANULOCYTES # BLD: 0 THOU/UL
IMM GRANULOCYTES NFR BLD: 0 %
LYMPHOCYTES # BLD AUTO: 1.1 THOU/UL (ref 0.8–4.4)
LYMPHOCYTES NFR BLD AUTO: 21 % (ref 20–40)
MAGNESIUM SERPL-MCNC: 1.6 MG/DL (ref 1.8–2.6)
MCH RBC QN AUTO: 30.7 PG (ref 27–34)
MCHC RBC AUTO-ENTMCNC: 34.4 G/DL (ref 32–36)
MCV RBC AUTO: 89 FL (ref 80–100)
MONOCYTES # BLD AUTO: 0.4 THOU/UL (ref 0–0.9)
MONOCYTES NFR BLD AUTO: 8 % (ref 2–10)
NEUTROPHILS # BLD AUTO: 3.5 THOU/UL (ref 2–7.7)
NEUTROPHILS NFR BLD AUTO: 70 % (ref 50–70)
PLATELET # BLD AUTO: 168 THOU/UL (ref 140–440)
PMV BLD AUTO: 8.4 FL (ref 7–10)
POTASSIUM BLD-SCNC: 4.2 MMOL/L (ref 3.5–5)
PROT SERPL-MCNC: 8.1 G/DL (ref 6–8)
RBC # BLD AUTO: 3.97 MILL/UL (ref 3.8–5.4)
SODIUM SERPL-SCNC: 131 MMOL/L (ref 136–145)
WBC: 5 THOU/UL (ref 4–11)

## 2021-04-30 RX ORDER — AMLODIPINE BESYLATE 10 MG/1
5 TABLET ORAL DAILY
Qty: 90 TABLET | Refills: 3 | Status: SHIPPED
Start: 2021-04-30 | End: 2021-08-23

## 2021-05-03 ENCOUNTER — COMMUNICATION - HEALTHEAST (OUTPATIENT)
Dept: FAMILY MEDICINE | Facility: CLINIC | Age: 76
End: 2021-05-03

## 2021-05-03 DIAGNOSIS — I10 ESSENTIAL HYPERTENSION: ICD-10-CM

## 2021-05-03 RX ORDER — AMLODIPINE BESYLATE 5 MG/1
5 TABLET ORAL DAILY
Qty: 90 TABLET | Refills: 3 | Status: SHIPPED | OUTPATIENT
Start: 2021-05-03 | End: 2021-11-15

## 2021-05-24 ENCOUNTER — RECORDS - HEALTHEAST (OUTPATIENT)
Dept: ADMINISTRATIVE | Facility: CLINIC | Age: 76
End: 2021-05-24

## 2021-05-25 ENCOUNTER — RECORDS - HEALTHEAST (OUTPATIENT)
Dept: ADMINISTRATIVE | Facility: CLINIC | Age: 76
End: 2021-05-25

## 2021-05-26 ENCOUNTER — RECORDS - HEALTHEAST (OUTPATIENT)
Dept: ADMINISTRATIVE | Facility: CLINIC | Age: 76
End: 2021-05-26

## 2021-05-27 ENCOUNTER — RECORDS - HEALTHEAST (OUTPATIENT)
Dept: ADMINISTRATIVE | Facility: CLINIC | Age: 76
End: 2021-05-27

## 2021-05-28 ENCOUNTER — RECORDS - HEALTHEAST (OUTPATIENT)
Dept: ADMINISTRATIVE | Facility: CLINIC | Age: 76
End: 2021-05-28

## 2021-05-28 NOTE — PROGRESS NOTES
ASSESSMENT AND PLAN:  Carolina García 74 y.o. female is seen here on 05/07/19 for follow-up of Sjogren's syndrome which is manifested by sicca symptoms, predominantly oral, ocular, background of positive THEE SSA antibodies.  She had evidence of inflammatory arthritis especially in her hands.  She has osteoarthritis.  She started hydroxychloroquine and she has had 2 episodes of shortness of breath.  She connected this to the hydroxychloroquine's start.  She stopped taking it.  I have recommended that she follows up promptly with her primary physician to have this looked into further as hydroxychloroquine should not be assumed to be a cause of her episodic at rest and dyspnea.  In the meanwhile for the joint symptoms a modest dose of prednisone as noted after pros and cons and side effects were outlined.  We will meet here in 3 months.       Diagnoses and all orders for this visit:    Sjogren's syndrome with keratoconjunctivitis sicca (H)  -     predniSONE (DELTASONE) 2.5 MG tablet; 7.5 mg/d prednisone PO for 1 month, reduce to 5 mg/d for the next month, and then reduce to 2.5 mg/d for the third month and then stop..  Dispense: 90 tablet; Refill: 2    Primary osteoarthritis involving multiple joints    Rotator Cuff Tendonitis    THEE positive      HISTORY OF PRESENTING ILLNESS:  Carolina García, 74 y.o., female is here for follow-up of Sjogren's syndrome.  She is complaining of pain in her hands especially the right side.  This is moderately severe.  This is worse in the morning.  She is having difficulty making a fist in the morning or flexing the fingers.  She has noted swelling in her digits.  She was started on hydroxychloroquine for the inflammatory joint disease on her previous visit.  After having taken for 3 weeks she developed shortness of breath.  She had another episode like that she stopped taking hydroxychloroquine thinking this may be the reason for her symptoms.  She is now hurting again as noted.  This  "is most prominently in her right hand index finger area.  She has found the secretagogues of some help.  She rated overall pain level is 2.5/10, moderately severe.   On a previous visit she had noted that symptoms going on for the past 3 or 4 years.  These comprise of sicca symptoms affecting her eyes, mouth.  She has had suboptimal dental health and has problems \"and almost all her teeth\".  She describes her dryness of mouth which is progressive.  This interferes with her swallowing.  This wakes her up from sleep.  She did not tolerate pilocarpine.  Cevimeline was helpful.  She was not so sure until she stopped taking it and had more dryness.  She has not had a parotid area swelling although an MRI done for evaluation of C-spine did incidentally note parotid swelling this is noted in the chart which is reviewed.  She reports no features suggestive of oral thrush.  She does not have numbness tingling of the toes her upper extremities.  She has joint pains more at the bases of the thumbs worse with activity such as trying to open a jar which she hardly can do now.  She has noted no sustained stiffness in the morning or swelling in her joints.  She is able do all her day-to-day activities without significant impairment.  She has noted fatigue, dryness of nose, sores sore tongue, history of night sweats.  There is no family history of ankylosing spondylitis, Sjogren's.  Sister is noted to have rheumatoid arthritis.  She is not a smoker does not take alcohol.  She has had knee replacement 2007 2008.  She is known to have cervical disc disease and had a disc placement in 2016.  No personal family history of psoriasis ulcerative colitis Crohn's disease. Further historical information and ADL limitations as noted in the multidimensional health assessment questionnaire attached in the EMR.   ALLERGIES:Hydroxychloroquine; Codeine; and Egg white    PAST MEDICAL/ACTIVE PROBLEMS/MEDICATION/ FAMILY HISTORY/SOCIAL DATA:  The " patient has a family history of  Past Medical History:   Diagnosis Date     GERD (gastroesophageal reflux disease)      Hypertension      Neck pain      Osteoarthritis 1/13/2016     Systemic lupus (H)      Social History     Tobacco Use   Smoking Status Never Smoker   Smokeless Tobacco Never Used     Patient Active Problem List   Diagnosis     Anxiety     Hypertension     Rotator Cuff Tendonitis     Esophageal Reflux     Allergic Rhinitis     Lower Back Pain Chronic     Vitamin D Deficiency     Menopause Has Occurred     Cervical radiculopathy at C5     Myelopathy, spondylogenic, cervical     Subacute cutaneous lupus erythematosus     Hypocomplementemia (H)     Sjogren's syndrome (H)     Primary osteoarthritis involving multiple joints     THEE positive     Localized primary carpometacarpal osteoarthritis     Current Outpatient Medications   Medication Sig Dispense Refill     amLODIPine (NORVASC) 10 MG tablet TAKE ONE TABLET BY MOUTH ONCE DAILY AS DIRECTED 90 tablet 3     CARBOXYMETHYLCELL/HYPROMELLOSE (GENTEAL GEL OPHT) Apply 2 drops to eye as needed.       cevimeline (EVOXAC) 30 mg capsule Take 1 capsule (30 mg total) by mouth 2 (two) times a day. 180 capsule 1     citalopram (CELEXA) 20 MG tablet Take 1.5 tablets (30 mg total) by mouth daily. 135 tablet 2     clotrimazole (LOTRIMIN) 1 % cream APPLY  CREAM EXTERNALLY TWICE DAILY 45 g 1     cyanocobalamin, vitamin B-12, (VITAMIN B12 ORAL) Take by mouth.       diclofenac sodium (VOLTAREN) 1 % Gel 1 g tid 100 g 1     ibuprofen (ADVIL) 200 MG tablet Take 400 mg by mouth 2 (two) times a day.       loratadine (CLARITIN) 10 mg tablet Take 10 mg by mouth daily.       losartan (COZAAR) 100 MG tablet TAKE 1 TABLET BY MOUTH ONCE DAILY NEED  TO  ESTABLISH  NEW  PROVIDER 90 tablet 3     nystatin (MYCOSTATIN) cream Apply 1 application topically as needed.        nystatin (MYCOSTATIN) cream Apply topically 3 (three) times a day. 30 g 3     OMEGA-3/DHA/EPA/FISH OIL (FISH  OIL-OMEGA-3 FATTY ACIDS) 300-1,000 mg capsule Take 2 g by mouth daily.       omeprazole (PRILOSEC) 20 MG capsule Take 20 mg by mouth 2 (two) times a day.       No current facility-administered medications for this visit.      DETAILED EXAMINATION  05/07/19  :  Vitals:    05/07/19 1054   BP: 102/60   Patient Site: Right Arm   Patient Position: Sitting   Cuff Size: Adult Large   Pulse: 92   Weight: 186 lb (84.4 kg)     Alert oriented. Head including the face is examined for malar rash, heliotropes, scarring, lupus pernio. Eyes examined for redness such as in episcleritis/scleritis, periorbital lesions.   Neck/ Face examined for parotid gland swelling, range of motion of neck.  Left upper and lower and right upper and lower extremities examined for tenderness, swelling, warmth of the appendicular joints, range of motion, edema, rash.  Some of the important findings included: She has tenderness and swelling such as on her right hand index finger PIP, MCP, and along the flexor tendon.  She has full range of motion of the shoulders..  She does have some saliva now.  She has bilateral TKAs.     LAB / IMAGING DATA:  ALT   Date Value Ref Range Status   08/20/2018 52 (H) 0 - 45 U/L Final   08/16/2017 54 (H) 0 - 45 U/L Final   03/13/2017 53 (H) 0 - 45 U/L Final     Albumin   Date Value Ref Range Status   08/20/2018 3.6 3.5 - 5.0 g/dL Final   08/16/2017 3.5 3.5 - 5.0 g/dL Final   03/13/2017 3.7 3.5 - 5.0 g/dL Final     Creatinine   Date Value Ref Range Status   08/20/2018 0.80 0.60 - 1.10 mg/dL Final   08/16/2017 0.95 0.60 - 1.10 mg/dL Final   03/13/2017 1.05 0.60 - 1.10 mg/dL Final       WBC   Date Value Ref Range Status   08/20/2018 3.7 (L) 4.0 - 11.0 thou/uL Final   03/13/2017 6.2 4.0 - 11.0 thou/uL Final     Hemoglobin   Date Value Ref Range Status   08/20/2018 12.4 12.0 - 16.0 g/dL Final   08/16/2017 12.8 12.0 - 16.0 g/dL Final   03/13/2017 13.7 12.0 - 16.0 g/dL Final     Platelets   Date Value Ref Range Status    08/20/2018 223 140 - 440 thou/uL Final   03/13/2017 239 140 - 440 thou/uL Final   04/06/2016 245 140 - 440 thou/uL Final       Lab Results   Component Value Date    RF 53.1 (H) 04/06/2016    SEDRATE 33 (H) 08/20/2018

## 2021-05-29 ENCOUNTER — RECORDS - HEALTHEAST (OUTPATIENT)
Dept: ADMINISTRATIVE | Facility: CLINIC | Age: 76
End: 2021-05-29

## 2021-05-29 NOTE — TELEPHONE ENCOUNTER
Refill Approved    Rx renewed per Medication Renewal Policy. Medication was last renewed on 6/6/18.    Elzbieta Cavazos, [facility] Triage/Med Refill 5/28/2019     Requested Prescriptions   Pending Prescriptions Disp Refills     amLODIPine (NORVASC) 10 MG tablet [Pharmacy Med Name: AMLODIPINE BESYLATE 10 MG TAB] 90 tablet 0     Sig: TAKE 1 TABLET BY MOUTH ONCE A DAY AS DIRECTED       Calcium-Channel Blockers Protocol Passed - 5/28/2019  7:34 AM        Passed - PCP or prescribing provider visit in past 12 months or next 3 months     Last office visit with prescriber/PCP: 3/13/2017 Socorro Dave MD OR same dept: 8/21/2018 Rich Kelly MD OR same specialty: 8/21/2018 Rich Kelly MD  Last physical: Visit date not found Last MTM visit: Visit date not found   Next visit within 3 mo: Visit date not found  Next physical within 3 mo: Visit date not found  Prescriber OR PCP: Socorro Dave MD  Last diagnosis associated with med order: 1. Essential hypertension  - amLODIPine (NORVASC) 10 MG tablet [Pharmacy Med Name: AMLODIPINE BESYLATE 10 MG TAB]; TAKE 1 TABLET BY MOUTH ONCE A DAY AS DIRECTED  Dispense: 90 tablet; Refill: 0    If protocol passes may refill for 12 months if within 3 months of last provider visit (or a total of 15 months).             Passed - Blood pressure filed in past 12 months     BP Readings from Last 1 Encounters:   05/07/19 102/60

## 2021-05-30 ENCOUNTER — RECORDS - HEALTHEAST (OUTPATIENT)
Dept: ADMINISTRATIVE | Facility: CLINIC | Age: 76
End: 2021-05-30

## 2021-05-30 VITALS — BODY MASS INDEX: 29.66 KG/M2 | WEIGHT: 189 LBS | HEIGHT: 67 IN

## 2021-05-30 NOTE — PROGRESS NOTES
Patient ID: Carolina García is a 74 y.o. female.  /68   Pulse 78   Resp 16   Wt 193 lb (87.5 kg)   LMP 01/22/1986   SpO2 98%   BMI 30.23 kg/m      Assessment/Plan:                   Diagnoses and all orders for this visit:    Atypical chest pain  -     Electrocardiogram Perform and Read  -     XR Chest 2 Views  -     HM2(CBC w/o Differential)  -     Comprehensive Metabolic Panel  -     Lipase    Chest pain    GUZMAN (dyspnea on exertion)  -     Electrocardiogram Perform and Read  -     XR Chest 2 Views  -     HM2(CBC w/o Differential)  -     Comprehensive Metabolic Panel  -     Lipase          DISCUSSION  She has an overall benign exam.  Chest x-ray does not show any distinct finding.  EKG shows normal sinus rhythm without any ischemic concern.  An initial hemogram obtained at the time of her office visit does not indicate any anemia.    The differential diagnosis for her symptoms would include coronary atherosclerosis causing symptoms, gastritis/esophagitis, other abdominal pathology.    Based on her evaluation I do not think we need to proceed with hospitalization for work-up I think it is reasonable to proceed with outpatient work-up.  We will obtain a stress test as a first step in the work-up.  We will wait additional labs drawn today to see if there is any other indication of potential cause.  Based on additional information would also consider endoscopy.  I will have her reduce the dose of NSAID she is taking because cardiac concern is a possibility I will have her initiate aspirin therapy.  We will increase omeprazole to twice daily.  We will await other results.  Subjective:     HPI    Carolina García is a 74 y.o. female who has hypertension, Sjogren's syndrome and acid reflux, but is otherwise healthy here today concerned regarding bouts of chest pain and some dyspnea with exertion.    Reports she has had several episodes of chest tightness.  One episode lasted 30 minutes.  Onset has been  somewhat random and not associated with activity.  One time she woke up at night with symptoms.  She also notes that she is on occasion short of breath.  Shortness of breath can occur with activity or with out.  It is not predictable.  Shortness of breath has been going on longer than chest pain which has been going on since the summer.    She does have a history of acid reflux.  She has a history of gastric bypass surgery.  She does take NSAIDs, has been on prednisone recently but takes omeprazole.  She does report some mild acid reflux symptoms on occasion.  She denies hematemesis hematochezia or melena.    In describing her chest pain she does report she is occasionally have pain radiating into the left arm.  She has not noticed diaphoresis dizziness or lightheadedness associated with pain.  She has no history of heart disease or lung disease.  Patient reports feeling completely normal between bouts.  Patient reports having mild symptoms earlier today but no symptoms currently.        Review of Systems  Complete review of systems is obtained.  Other than the specific considerations noted above complete review of systems is negative.          Objective:   Medications:  Current Outpatient Medications   Medication Sig     amLODIPine (NORVASC) 10 MG tablet TAKE 1 TABLET BY MOUTH ONCE A DAY AS DIRECTED     CARBOXYMETHYLCELL/HYPROMELLOSE (GENTEAL GEL OPHT) Apply 2 drops to eye as needed.     cevimeline (EVOXAC) 30 mg capsule TAKE 1 CAPSULE BY MOUTH TWICE A DAY     citalopram (CELEXA) 20 MG tablet Take 1.5 tablets (30 mg total) by mouth daily.     clotrimazole (LOTRIMIN) 1 % cream APPLY  CREAM EXTERNALLY TWICE DAILY     cyanocobalamin, vitamin B-12, (VITAMIN B12 ORAL) Take by mouth.     hydroxychloroquine (PLAQUENIL) 200 mg tablet Take 200 mg by mouth 2 (two) times a day.     loratadine (CLARITIN) 10 mg tablet Take 10 mg by mouth daily.     losartan (COZAAR) 100 MG tablet TAKE 1 TABLET BY MOUTH ONCE DAILY NEED  TO   ESTABLISH  NEW  PROVIDER     nystatin (MYCOSTATIN) cream Apply topically 3 (three) times a day.     OMEGA-3/DHA/EPA/FISH OIL (FISH OIL-OMEGA-3 FATTY ACIDS) 300-1,000 mg capsule Take 2 g by mouth daily.     omeprazole (PRILOSEC) 20 MG capsule Take 20 mg by mouth 2 (two) times a day.     predniSONE (DELTASONE) 2.5 MG tablet 7.5 mg/d prednisone PO for 1 month, reduce to 5 mg/d for the next month, and then reduce to 2.5 mg/d for the third month and then stop..       Allergies:  Allergies   Allergen Reactions     Hydroxychloroquine Shortness Of Breath     Codeine      Emesis, tired     Egg White      Dumping syndrome       Tobacco:   reports that she has never smoked. She has never used smokeless tobacco.     Physical Exam          /68   Pulse 78   Resp 16   Wt 193 lb (87.5 kg)   LMP 01/22/1986   SpO2 98%   BMI 30.23 kg/m          General Appearance:    Alert, cooperative, no distress   Eyes:   No scleral icterus or conjunctival irritation       Ears:    Normal TM's and external ear canals, both ears   Throat:   Lips, mucosa, and tongue normal; teeth and gums normal   Neck:   Supple, symmetrical, trachea midline, no adenopathy;        thyroid:  No enlargement/tenderness/nodules   Lungs:     Clear to auscultation bilaterally, respirations unlabored, no wheezes or crackles   Heart:    Regular rate and rhythm,  No murmur   Abdomen:    Soft, no distention, no tenderness on palpation, no masses, no organomegaly     Extremities:  No edema, no joint swelling or redness, no evidence of any injuries   Skin:  No concerning skin findings, no suspicious moles, no rashes   Neurologic:  On gross examination there is no motor or sensory deficit.  Patient walks with a normal gait

## 2021-05-30 NOTE — TELEPHONE ENCOUNTER
Pt called in states she chest pain.  The pain is on her   Started 3 month ago.  Had shoulder pain last weekend.  Pt has indigestion and chest pain last night.  The symptom is come and go.  The symptom last 15 min.  The pain was 6/10 on the scale.  Pt states no pain at this time.  Pt is taking HTN medication.  No lung disease in the past.  No dizziness, no nausea,  no vomit, no sweating, no difficulty breathing, no cough.  The disposition is to be seen with in 24 hours.  Care advice given per protocol.  Patient agrees with care advice given.   Agreed to call back if he has additional symptoms or questions.       Frantz Castro RN, [facility] Triage/Med Refill 7/29/2019 4:01 PM        Reason for Disposition    [1] Chest pain lasts > 5 minutes AND [2] occurred > 3 days ago (72 hours) AND [3] NO chest pain or cardiac symptoms now    Protocols used: CHEST PAIN-A-

## 2021-05-30 NOTE — PATIENT INSTRUCTIONS - HE
Decrease ibuprofen down to 1 tablet twice daily temporarily.    Continue aspirin 81 mg once daily.    Increase omeprazole to 2 tablets daily in the morning and 1 at night.    I will make arrangements for you to undergo a cardiac stress test.  You will receive a call to schedule an appointment.    If you get chest pain or shortness of breath that is persistent or more severe than you have experienced please seek emergent medical evaluation.    .

## 2021-05-31 ENCOUNTER — RECORDS - HEALTHEAST (OUTPATIENT)
Dept: ADMINISTRATIVE | Facility: CLINIC | Age: 76
End: 2021-05-31

## 2021-05-31 VITALS — HEIGHT: 67 IN | WEIGHT: 191.4 LBS | BODY MASS INDEX: 30.04 KG/M2

## 2021-05-31 NOTE — TELEPHONE ENCOUNTER
Refill Approved    Rx renewed per Medication Renewal Policy. Medication was last renewed on 8/21/18.    Morena Valencia, [facility] Triage/Med Refill 8/25/2019     Requested Prescriptions   Pending Prescriptions Disp Refills     citalopram (CELEXA) 20 MG tablet [Pharmacy Med Name: CITALOPRAM HBR 20 MG TABLET] 135 tablet 0     Sig: TAKE 1 & 1/2 TABLETS BY MOUTH ONCE A DAY       [facility] Refill Protocol  Passed - 8/24/2019  8:45 AM        Passed - PCP or prescribing provider visit in last year     Last office visit with prescriber/PCP: 7/30/2019 Rich Kelly MD OR same dept: 7/30/2019 Rich Kelly MD OR same specialty: 7/30/2019 Rich Kelly MD  Last physical: Visit date not found Last MTM visit: Visit date not found   Next visit within 3 mo: Visit date not found  Next physical within 3 mo: Visit date not found  Prescriber OR PCP: Rich Kelly MD  Last diagnosis associated with med order: 1. Anxiety  - citalopram (CELEXA) 20 MG tablet [Pharmacy Med Name: CITALOPRAM HBR 20 MG TABLET]; TAKE 1 & 1/2 TABLETS BY MOUTH ONCE A DAY  Dispense: 135 tablet; Refill: 0    If protocol passes may refill for 12 months if within 3 months of last provider visit (or a total of 15 months).

## 2021-05-31 NOTE — PROGRESS NOTES
[facility] Consultation Note    Thank you, Dr. Kelly, Rich UMANZOR MD, for asking the [facility] team to see Carolina García in consultation today at our clinic to evaluate chest pain with abnormal nuclear stress test.      Assessment:   1.  Chest pain with both typical and atypical features for angina  2.  Abnormal nuclear stress test with a small area of anterolateral ischemia  3.  Essential hypertension     Plan:   We will proceed with coronary angiography and possible angioplasty early next week at [facility].  Further treatment recommendations will be dependent on the patient's coronary anatomy            Current History:   74-year-old female without any significant past cardiac.  Patient did have an adenosine Cardiolite study in 2010 that showed no evidence of ischemia with ejection fraction of 51%.  Beginning about a month ago the patient is developed some chest pressure radiating to her left axillary area.  The pressure generally occurs at rest without any exertional component present.  It will spontaneously resolve within 10 to 15 minutes.  She had a more severe episode last week lasting 30 minutes.  She subsequently underwent Lexiscan Cardiolite testing on Tuesday, August 13.  This showed a small area of anterolateral ischemia with ejection fraction 72%.  Patient has continued over the last week to have intermittent chest pressure again not related almost on a daily basis.  He denies any nocturnal chest pain but was awake and once in the morning with her chest pressure    Past Medical History:     Past Medical History:   Diagnosis Date     GERD (gastroesophageal reflux disease)      Hypertension      Neck pain      Osteoarthritis 1/13/2016     Systemic lupus (H)        Past Surgical History:     Past Surgical History:   Procedure Laterality Date     ANAL FISSURECTOMY       BREAST BIOPSY Right 2005    benign     HYSTERECTOMY  1986     [place] GASTROPLASTY,OBESITY,OTHER       "Description: Gastric Surgery For Morbid Obesity Gastric Stapling;  Recorded: 2012;     [place] REMOVAL GALLBLADDER      Description: Cholecystectomy;  Recorded: 2012;     [place] TOT DISC ARTHRP ART DISC ANT APPRO 1 [facility] CRV N/A 2015    Procedure:  C45 MOBI-C DISC ARTHORPLASTY, ATTEMPT MOBI C C56, ANTERIOR CERVICAL DISCECTOMY AND FUSION IF UNABLE TO PLACE ;  Surgeon: Korina Beltrán MD;  Location: [facility];  Service: Spine     REPLACEMENT TOTAL KNEE BILATERAL         Family History:     Family History   Problem Relation Age of Onset     Breast cancer Sister 52     Cancer Sister      Breast cancer Sister 60   Father's health is unknown.  Mother  of cancer at age 68.  Patient has 1 brother and one sister currently living with no known coronary artery disease in any of her siblings    Social History:    reports that she has never smoked. She has never used smokeless tobacco. She reports that she drinks alcohol. She reports that she does not use drugs.  Patient is     Meds:   Scheduled Meds:  PRN Meds:.    Allergies:   Hydroxychloroquine; Codeine; and Egg white    Review of Systems:   General: WNL  Eyes: WNL  Ears/Nose/Throat: Hearing Loss  Lungs: Shortness of Breath  Heart: Chest Pain, Shortness of Breath with activity  Stomach: Heartburn  Bladder: Frequent Urination at Night  Muscle/Joints: WNL  Skin: WNL  Nervous System: WNL  Mental Health: WNL     Blood: WNL      Objective:      Physical Exam  194 lb (88 kg)  5' 7\" (1.702 m)  Body mass index is 30.38 kg/m .  /68 (Patient Site: Right Arm, Patient Position: Sitting, Cuff Size: Adult Regular)   Pulse 88   Resp 16   Ht 5' 7\" (1.702 m)   Wt 194 lb (88 kg)   LMP 1986   BMI 30.38 kg/m      General Appearance:   alert, no apparent distress   HEENT:  no scleral icterus; the mucous membranes were pink and moist                                  Neck: jugular venous pressure is normal, no thyromegaly   Chest: the spine was straight " and the chest was symmetric   Lungs:   respirations unlabored; the lungs are clear to auscultation   Cardiovascular:   regular rhythm with normal first and second heart sounds and no murmurs, clicks, or gallops. Carotid, radial, femoral, and posterior tibial pulses are intact; there are no carotid or femoral bruits.   Abdomen:  no organomegaly, masses, bruits, or tenderness; bowel sounds are present   Extremities: no cyanosis, clubbing, or edema   Skin: no xanthelasma   Neurologic: mood and affect are appropriate         Cardiolite (Tc-99m Sestamibi) stress test results from August 13, 2019 reviewed as above          Lab Review   Lab Results   Component Value Date     (L) 07/30/2019    K 4.2 07/30/2019     07/30/2019    CO2 25 07/30/2019    BUN 6 (L) 07/30/2019    CREATININE 0.88 07/30/2019    CALCIUM 9.1 07/30/2019     Lab Results   Component Value Date    WBC 4.7 07/30/2019    HGB 12.8 07/30/2019    HCT 38.3 07/30/2019    MCV 90 07/30/2019     07/30/2019     Lab Results   Component Value Date    CHOL 149 08/16/2017    TRIG 85 08/16/2017    HDL 37 (L) 08/16/2017         Tyler Rosario M.D., F.A.C.CNovant Health [facility]  [phone]

## 2021-05-31 NOTE — PROGRESS NOTES
Carolina MUNOZ Hallam  [address] [facility] [address]  [phone] (home)     Procedure cardiologist:  Dr. Vasquez or Dr. Cook  PCP:  Rich Kelly MD  H&P completed by:  Dr. Rosario, 8/15/19  Admit date 8/22/19  Arrival time:  0600  Anticoagulation: None  CPAP: No  Previous PCI: No  Bypass Grafts: No  Renal Issues: No  Diabetic?: No  Device?: No  Type:  n/a    Angiogram Teaching    Reason for Visit:  Patient seen for pre-procedure education in preparation for: Cors poss PCI    Procedure Prep:  Primary Cardiologist note dated: 8/15/19  EKG results obtained, dated: am of procedure  Hemogram results obtained: am of procedure  Basic Metabolic Panel results obtained: am of procedure  Lipid Profile results obtained: am of procedure    Patient Education  Patient declined to review indications/risks of procedure.      Pre-procedure instructions  Patient instructed to be NPO after midnight.  Patient instructed to shower the evening before or the morning of the procedure.  Patient instructed to arrange for transportation home following procedure from a responsible family member of friend. No driving for at least 24 hours.  Patient instructed to have a responsible adult with them for 24 hours post-procedure.  Post-procedure follow up process.  Conscious sedation discussed.    Pre-procedure medication instructions  Patient instructed on antiplatelet medication.  Continue medications as scheduled, with a small amount of water on the day of the procedure unless indicated.  Patient instructed to take 324 mg of Aspirin am of procedure: Yes  Other medication: instructed to only take aspirin amlodipine, cevimeline, citalopram, losartan, and prednisone a.m. of the procedure.    *PATIENTS RECORDS AVAILABLE IN [facility] UNLESS OTHERWISE INDICATED*      Patient Active Problem List   Diagnosis     Anxiety     Hypertension     Rotator Cuff Tendonitis     Esophageal Reflux     Allergic Rhinitis     Lower Back Pain Chronic     Vitamin  D Deficiency     Menopause Has Occurred     Cervical radiculopathy at C5     Myelopathy, spondylogenic, cervical     Subacute cutaneous lupus erythematosus     Hypocomplementemia (H)     Sjogren's syndrome (H)     Primary osteoarthritis involving multiple joints     THEE positive     Localized primary carpometacarpal osteoarthritis     Abnormal nuclear stress test       Current Outpatient Medications   Medication Sig Dispense Refill     amLODIPine (NORVASC) 10 MG tablet TAKE 1 TABLET BY MOUTH ONCE A DAY AS DIRECTED 90 tablet 0     CARBOXYMETHYLCELL/HYPROMELLOSE (GENTEAL GEL OPHT) Apply 2 drops to eye as needed.       cevimeline (EVOXAC) 30 mg capsule TAKE 1 CAPSULE BY MOUTH TWICE A  capsule 0     citalopram (CELEXA) 20 MG tablet Take 1.5 tablets (30 mg total) by mouth daily. 135 tablet 2     clotrimazole (LOTRIMIN) 1 % cream APPLY  CREAM EXTERNALLY TWICE DAILY 45 g 1     cyanocobalamin, vitamin B-12, (VITAMIN B12 ORAL) Take by mouth.       hydroxychloroquine (PLAQUENIL) 200 mg tablet Take 200 mg by mouth 2 (two) times a day.  6     loratadine (CLARITIN) 10 mg tablet Take 10 mg by mouth daily.       losartan (COZAAR) 100 MG tablet TAKE 1 TABLET BY MOUTH ONCE DAILY NEED  TO  ESTABLISH  NEW  PROVIDER 90 tablet 3     nystatin (MYCOSTATIN) cream Apply topically 3 (three) times a day. 30 g 3     OMEGA-3/DHA/EPA/FISH OIL (FISH OIL-OMEGA-3 FATTY ACIDS) 300-1,000 mg capsule Take 2 g by mouth daily.       omeprazole (PRILOSEC) 20 MG capsule Take 20 mg by mouth 2 (two) times a day.       predniSONE (DELTASONE) 2.5 MG tablet 7.5 mg/d prednisone PO for 1 month, reduce to 5 mg/d for the next month, and then reduce to 2.5 mg/d for the third month and then stop.. 90 tablet 2     No current facility-administered medications for this visit.        Allergies   Allergen Reactions     Hydroxychloroquine Shortness Of Breath     Codeine      Emesis, tired     Egg White      Dumping syndrome         GEOVANY Enciso

## 2021-06-01 VITALS — WEIGHT: 190 LBS | BODY MASS INDEX: 29.76 KG/M2

## 2021-06-01 VITALS — WEIGHT: 191 LBS | HEIGHT: 67 IN | BODY MASS INDEX: 29.98 KG/M2

## 2021-06-01 NOTE — PROGRESS NOTES
[facility] Progress Note    Assessment:    1.  Atypical chest pain improved  2.  Minimal coronary artery disease by recent coronary angiography  3.  False positive nuclear stress test  4.  Essential hypertension    Plan:    Would discontinue her isosorbide mononitrate at this time.  No further cardiac evaluation or follow-up as needed    An After Visit Summary was printed and given to the patient.    Subjective:    74-year-old female who developed recent atypical chest pain and had a nuclear stress test on August 13 that showed a small area of distal anterolateral ischemia.  Subsequent coronary angiography was performed on August 22 showing a normal left anterior descending artery and circumflex artery with a 20% proximal right coronary artery stenosis present.  Patient subsequently has been on isosorbide mononitrate for the last month with minimal episodes of left axillary pain not exertionally related and much improved    Problem List:    Patient Active Problem List   Diagnosis     Anxiety     Hypertension     Rotator Cuff Tendonitis     Esophageal Reflux     Allergic Rhinitis     Lower Back Pain Chronic     Vitamin D Deficiency     Menopause Has Occurred     Cervical radiculopathy at C5     Myelopathy, spondylogenic, cervical     Subacute cutaneous lupus erythematosus     Hypocomplementemia (H)     Sjogren's syndrome (H)     Primary osteoarthritis involving multiple joints     THEE positive     Localized primary carpometacarpal osteoarthritis     Abnormal nuclear stress test     Precordial chest pain         Current Outpatient Medications   Medication Sig Dispense Refill     amLODIPine (NORVASC) 10 MG tablet Take 1 tablet (10 mg total) by mouth daily. As directed 90 tablet 3     aspirin (ASPIRIN LOW DOSE) 81 MG EC tablet Take 81 mg by mouth daily.       CARBOXYMETHYLCELL/HYPROMELLOSE (GENTEAL GEL OPHT) Apply 2 drops to eye as needed.       cevimeline (EVOXAC) 30 mg capsule TAKE 1 CAPSULE BY MOUTH  TWICE A  capsule 0     citalopram (CELEXA) 20 MG tablet TAKE 1 & 1/2 TABLETS BY MOUTH ONCE A  tablet 3     clotrimazole (LOTRIMIN) 1 % cream APPLY  CREAM EXTERNALLY TWICE DAILY (Patient taking differently: as needed.       ) 45 g 1     cyanocobalamin, vitamin B-12, (VITAMIN B12 ORAL) Take by mouth daily.              isosorbide mononitrate (IMDUR) 30 MG 24 hr tablet Take 1 tablet (30 mg total) by mouth daily. 30 tablet 11     loratadine (CLARITIN) 10 mg tablet Take 10 mg by mouth daily.       losartan (COZAAR) 100 MG tablet TAKE 1 TABLET BY MOUTH ONCE DAILY NEED  TO  ESTABLISH  NEW  PROVIDER 90 tablet 3     nystatin (MYCOSTATIN) cream Apply topically 3 (three) times a day. (Patient taking differently: Apply topically as needed.       ) 30 g 3     OMEGA-3/DHA/EPA/FISH OIL (FISH OIL-OMEGA-3 FATTY ACIDS) 300-1,000 mg capsule Take 2 g by mouth daily.       omeprazole (PRILOSEC) 20 MG capsule Take 20 mg by mouth 2 (two) times a day.       predniSONE (DELTASONE) 2.5 MG tablet 7.5 mg/d prednisone PO for 1 month, reduce to 5 mg/d for the next month, and then reduce to 2.5 mg/d for the third month and then stop.. 90 tablet 2     No current facility-administered medications for this visit.        .  Past Surgical History:   Procedure Laterality Date     ANAL FISSURECTOMY       BREAST BIOPSY Right 2005    benign     CV CORONARY ANGIOGRAM N/A 8/22/2019    Procedure: Coronary Angiogram;  Surgeon: Melissa Vasquez MD;  Location: [facility];  Service: Cardiology     CV LEFT HEART CATHETERIZATION WO LEFT VETRICULOGRAM Left 8/22/2019    Procedure: Left Heart Catheterization Without Left Ventriculogram;  Surgeon: Melissa Vasquez MD;  Location: [facility];  Service: Cardiology     HYSTERECTOMY  1986     WY GASTROPLASTY,OBESITY,OTHER      Description: Gastric Surgery For Morbid Obesity Gastric Stapling;  Recorded: 04/16/2012;     [place] REMOVAL GALLBLADDER      Description: Cholecystectomy;  Recorded:  04/16/2012;     [place] TOT DISC ARTHRP ART DISC ANT APPRO 1 NTRSPC CRV N/A 6/17/2015    Procedure:  C45 MOBI-C DISC ARTHORPLASTY, ATTEMPT MOBI C C56, ANTERIOR CERVICAL DISCECTOMY AND FUSION IF UNABLE TO PLACE ;  Surgeon: Korina Beltrán MD;  Location: [facility];  Service: Spine     REPLACEMENT TOTAL KNEE BILATERAL         .  Social History     Socioeconomic History     Marital status:      Spouse name: Not on file     Number of children: 3     Years of education: Not on file     Highest education level: Not on file   Occupational History     Occupation: Retired     Comment: [employer]      Occupation: [employer]   Social Needs     Financial resource strain: Not on file     Food insecurity:     Worry: Not on file     Inability: Not on file     Transportation needs:     Medical: Not on file     Non-medical: Not on file   Tobacco Use     Smoking status: Never Smoker     Smokeless tobacco: Never Used   Substance and Sexual Activity     Alcohol use: Yes     Comment: glass of wine on occasion     Drug use: No     Sexual activity: Yes     Partners: Male   Lifestyle     Physical activity:     Days per week: Not on file     Minutes per session: Not on file     Stress: Not on file   Relationships     Social connections:     Talks on phone: Not on file     Gets together: Not on file     Attends [employer] service: Not on file     Active member of club or organization: Not on file     Attends meetings of clubs or organizations: Not on file     Relationship status: Not on file     Intimate partner violence:     Fear of current or ex partner: Not on file     Emotionally abused: Not on file     Physically abused: Not on file     Forced sexual activity: Not on file   Other Topics Concern     Not on file   Social History Narrative     Not on file       .  Family History   Problem Relation Age of Onset     Breast cancer Sister 52     Cancer Sister      Breast cancer Sister 60     Review of Systems:  General: WNL  Eyes:  "WNL  Ears/Nose/Throat: Hearing Loss  Lungs: WNL  Heart: Shortness of Breath with activity  Stomach: WNL  Bladder: WNL  Muscle/Joints: WNL  Skin: WNL  Nervous System: WNL  Mental Health: WNL     Blood: WNL    Objective:     BP 90/58 (Patient Site: Left Arm, Patient Position: Sitting, Cuff Size: Adult Regular)   Pulse 84   Resp 16   Ht 5' 7\" (1.702 m)   Wt 196 lb (88.9 kg)   LMP 01/22/1986   BMI 30.70 kg/m    196 lb (88.9 kg)   [unfilled]    Physical Exam:    GENERAL APPEARANCE: alert, no apparent distress  HEENT: no scleral icterus or xanthelasma  NECK: jugular venous pressure normal  CHEST: symmetric, the lungs were clear to auscultation  CARDIOVASCULAR: regular rhythm without murmur, click, or gallop; no carotid bruits  ABDOMEN: nondistended, nontender, bowel sounds present  EXTREMITIES: no cyanosis, clubbing or edema.    Cardiac Testing:    Cardiolite (Tc-99m Sestamibi) stress test from August 13, 2019 results reviewed as above      Lab Results:    LIPIDS:  Lab Results   Component Value Date    CHOL 152 08/22/2019    CHOL 149 08/16/2017    CHOL 167 07/08/2014     Lab Results   Component Value Date    HDL 46 (L) 08/22/2019    HDL 37 (L) 08/16/2017    HDL 37 (L) 07/08/2014     Lab Results   Component Value Date    LDLCALC 88 08/22/2019    LDLCALC 95 08/16/2017    LDLCALC 112 07/08/2014     Lab Results   Component Value Date    TRIG 90 08/22/2019    TRIG 85 08/16/2017    TRIG 91 07/08/2014     No components found for: CHOLHDL    BMP:  Lab Results   Component Value Date    CREATININE 0.96 08/22/2019    BUN 7 (L) 08/22/2019     08/22/2019    K 3.7 08/22/2019     08/22/2019    CO2 25 08/22/2019         Tyler Rosario MD,F.A.C.C.  [facility]  [phone]                "

## 2021-06-01 NOTE — TELEPHONE ENCOUNTER
Refill Approved    Rx renewed per Medication Renewal Policy. Medication was last renewed on 5/28/19.    Morena Valencia, [facility] Triage/Med Refill 9/8/2019     Requested Prescriptions   Pending Prescriptions Disp Refills     amLODIPine (NORVASC) 10 MG tablet [Pharmacy Med Name: AMLODIPINE BESYLATE 10 MG TAB] 90 tablet 0     Sig: TAKE 1 TABLET BY MOUTH EVERY DAY AS DIRECTED       Calcium-Channel Blockers Protocol Passed - 9/7/2019  8:52 AM        Passed - PCP or prescribing provider visit in past 12 months or next 3 months     Last office visit with prescriber/PCP: 7/30/2019 Rich Kelly MD OR same dept: 7/30/2019 Rich Kelly MD OR same specialty: 7/30/2019 Rich Kelly MD  Last physical: Visit date not found Last MTM visit: Visit date not found   Next visit within 3 mo: Visit date not found  Next physical within 3 mo: Visit date not found  Prescriber OR PCP: Rich Kelly MD  Last diagnosis associated with med order: 1. Essential hypertension  - amLODIPine (NORVASC) 10 MG tablet [Pharmacy Med Name: AMLODIPINE BESYLATE 10 MG TAB]; TAKE 1 TABLET BY MOUTH EVERY DAY AS DIRECTED  Dispense: 90 tablet; Refill: 0    If protocol passes may refill for 12 months if within 3 months of last provider visit (or a total of 15 months).             Passed - Blood pressure filed in past 12 months     BP Readings from Last 1 Encounters:   08/22/19 109/62

## 2021-06-01 NOTE — TELEPHONE ENCOUNTER
Refill Approved    Rx renewed per Medication Renewal Policy. Medication was last renewed on 10/3/18.    Elzbieta Cavazos, [facility] Triage/Med Refill 9/24/2019     Requested Prescriptions   Pending Prescriptions Disp Refills     losartan (COZAAR) 100 MG tablet [Pharmacy Med Name: LOSARTAN POTASSIUM 100 MG TAB] 90 tablet 1     Sig: TAKE 1 TABLET BY MOUTH ONCE A DAY       Angiotensin Receptor Blocker Protocol Passed - 9/24/2019  1:57 AM        Passed - PCP or prescribing provider visit in past 12 months       Last office visit with prescriber/PCP: 7/30/2019 Rich Kelly MD OR same dept: 7/30/2019 Rich Kelly MD OR same specialty: 7/30/2019 Rich Kelly MD  Last physical: Visit date not found Last MTM visit: Visit date not found   Next visit within 3 mo: Visit date not found  Next physical within 3 mo: Visit date not found  Prescriber OR PCP: Rich Kelly MD  Last diagnosis associated with med order: 1. Hypertension  - losartan (COZAAR) 100 MG tablet [Pharmacy Med Name: LOSARTAN POTASSIUM 100 MG TAB]; TAKE 1 TABLET BY MOUTH ONCE A DAY  Dispense: 90 tablet; Refill: 1    If protocol passes may refill for 12 months if within 3 months of last provider visit (or a total of 15 months).             Passed - Serum potassium within the past 12 months     Lab Results   Component Value Date    Potassium 3.7 08/22/2019             Passed - Blood pressure filed in past 12 months     BP Readings from Last 1 Encounters:   09/23/19 90/58             Passed - Serum creatinine within the past 12 months     Creatinine   Date Value Ref Range Status   08/22/2019 0.96 0.60 - 1.10 mg/dL Final

## 2021-06-02 ENCOUNTER — RECORDS - HEALTHEAST (OUTPATIENT)
Dept: ADMINISTRATIVE | Facility: CLINIC | Age: 76
End: 2021-06-02

## 2021-06-02 VITALS — WEIGHT: 186 LBS | BODY MASS INDEX: 29.13 KG/M2

## 2021-06-02 VITALS — WEIGHT: 190 LBS | BODY MASS INDEX: 29.82 KG/M2 | HEIGHT: 67 IN

## 2021-06-03 VITALS — WEIGHT: 194 LBS | HEIGHT: 67 IN | BODY MASS INDEX: 30.45 KG/M2

## 2021-06-03 VITALS — BODY MASS INDEX: 30.49 KG/M2 | HEIGHT: 67 IN | WEIGHT: 194.3 LBS

## 2021-06-03 VITALS
RESPIRATION RATE: 16 BRPM | SYSTOLIC BLOOD PRESSURE: 90 MMHG | WEIGHT: 196 LBS | HEIGHT: 67 IN | HEART RATE: 84 BPM | BODY MASS INDEX: 30.76 KG/M2 | DIASTOLIC BLOOD PRESSURE: 58 MMHG

## 2021-06-03 VITALS — WEIGHT: 193 LBS | BODY MASS INDEX: 30.23 KG/M2

## 2021-06-04 VITALS
SYSTOLIC BLOOD PRESSURE: 118 MMHG | DIASTOLIC BLOOD PRESSURE: 64 MMHG | HEART RATE: 87 BPM | TEMPERATURE: 98.1 F | OXYGEN SATURATION: 99 % | BODY MASS INDEX: 28.94 KG/M2 | WEIGHT: 184.8 LBS

## 2021-06-04 VITALS
HEIGHT: 67 IN | BODY MASS INDEX: 30.76 KG/M2 | SYSTOLIC BLOOD PRESSURE: 112 MMHG | WEIGHT: 196 LBS | DIASTOLIC BLOOD PRESSURE: 70 MMHG

## 2021-06-05 VITALS
SYSTOLIC BLOOD PRESSURE: 112 MMHG | WEIGHT: 176.7 LBS | OXYGEN SATURATION: 99 % | BODY MASS INDEX: 27.68 KG/M2 | DIASTOLIC BLOOD PRESSURE: 64 MMHG | HEART RATE: 97 BPM

## 2021-06-05 NOTE — TELEPHONE ENCOUNTER
Per  to call pt back to let her know we can schedule a appt for her to come back to do the injection. Patient states she will wait until her next follow up. Patient states she waited to long she couldn't wait. I did scheduled her next follow up.

## 2021-06-05 NOTE — PROGRESS NOTES
"ASSESSMENT AND PLAN:  Carolina García 75 y.o. female is seen here on 02/06/20 for follow-up of Sjogren's syndrome which is manifested by sicca symptoms, predominantly oral, ocular, background of positive THEE SSA antibodies.  In the past at one time she had evidence of inflammatory arthritis especially in her hands, no recurrence, had discontinued hydroxychloroquine because of likely side effects..  She has osteoarthritis.  Continue cevimeline as now.  She has triggering of the right index finger which can be injected with corticosteroids.     Diagnoses and all orders for this visit:    Sjogren's syndrome with keratoconjunctivitis sicca (H)  -     cevimeline (EVOXAC) 30 mg capsule; Take 1 capsule (30 mg total) by mouth 2 (two) times a day.  Dispense: 120 capsule; Refill: 0    Trigger finger, right index finger  -     Discontinue: triamcinolone acetonide 40 mg/mL injection 20 mg (KENALOG-40)    Primary osteoarthritis involving multiple joints    THEE positive      HISTORY OF PRESENTING ILLNESS:  Carolina García, 75 y.o., female is here for follow-up of Sjogren's syndrome.  She is complaining of pain in her hands especially the right side.  This is moderately severe.  This is worse in the morning.  She is having difficulty making a fist in the morning or flexing the index finger.  She wonders if there is some swelling there.  She is noted previously is no longer on hydroxychloroquine.  She is finding cevimeline is helpful for her dry symptoms.  She gets occasional rash on the chest area that she uses topical steroid creams with good effect.   On a previous visit she had noted that symptoms going on for the past 3 or 4 years.  These comprise of sicca symptoms affecting her eyes, mouth.  She has had suboptimal dental health and has problems \"and almost all her teeth\".  She describes her dryness of mouth which is progressive.  This interferes with her swallowing.  This wakes her up from sleep.  She did not tolerate " pilocarpine.  Cevimeline was helpful.  She was not so sure until she stopped taking it and had more dryness.  She has not had a parotid area swelling although an MRI done for evaluation of C-spine did incidentally note parotid swelling this is noted in the chart which is reviewed.  She reports no features suggestive of oral thrush.  She does not have numbness tingling of the toes her upper extremities.  She has joint pains more at the bases of the thumbs worse with activity such as trying to open a jar which she hardly can do now.  She has noted no sustained stiffness in the morning or swelling in her joints.  She is able do all her day-to-day activities without significant impairment.  She has noted fatigue, dryness of nose, sores sore tongue, history of night sweats.  There is no family history of ankylosing spondylitis, Sjogren's.  Sister is noted to have rheumatoid arthritis.  She is not a smoker does not take alcohol.  She has had knee replacement 2007 2008.  She is known to have cervical disc disease and had a disc placement in 2016.  No personal family history of psoriasis ulcerative colitis Crohn's disease. Further historical information and ADL limitations as noted in the multidimensional health assessment questionnaire attached in the EMR.   ALLERGIES:Hydroxychloroquine; Codeine; and Egg white    PAST MEDICAL/ACTIVE PROBLEMS/MEDICATION/ FAMILY HISTORY/SOCIAL DATA:  The patient has a family history of  Past Medical History:   Diagnosis Date     GERD (gastroesophageal reflux disease)      Hypertension      Neck pain      Osteoarthritis 1/13/2016     Systemic lupus (H)      Social History     Tobacco Use   Smoking Status Never Smoker   Smokeless Tobacco Never Used     Patient Active Problem List   Diagnosis     Anxiety     Hypertension     Rotator Cuff Tendonitis     Esophageal Reflux     Allergic Rhinitis     Lower Back Pain Chronic     Vitamin D Deficiency     Menopause Has Occurred     Cervical  radiculopathy at C5     Myelopathy, spondylogenic, cervical     Subacute cutaneous lupus erythematosus     Hypocomplementemia (H)     Sjogren's syndrome (H)     Primary osteoarthritis involving multiple joints     THEE positive     Localized primary carpometacarpal osteoarthritis     Abnormal nuclear stress test     Precordial chest pain     Current Outpatient Medications   Medication Sig Dispense Refill     amLODIPine (NORVASC) 10 MG tablet Take 1 tablet (10 mg total) by mouth daily. As directed 90 tablet 3     aspirin (ASPIRIN LOW DOSE) 81 MG EC tablet Take 81 mg by mouth daily.       CARBOXYMETHYLCELL/HYPROMELLOSE (GENTEAL GEL OPHT) Apply 2 drops to eye as needed.       cevimeline (EVOXAC) 30 mg capsule Take 1 capsule (30 mg total) by mouth 2 (two) times a day. 120 capsule 0     citalopram (CELEXA) 20 MG tablet TAKE 1 & 1/2 TABLETS BY MOUTH ONCE A  tablet 3     clotrimazole (LOTRIMIN) 1 % cream APPLY  CREAM EXTERNALLY TWICE DAILY (Patient taking differently: as needed.       ) 45 g 1     cyanocobalamin, vitamin B-12, (VITAMIN B12 ORAL) Take by mouth daily.              isosorbide mononitrate (IMDUR) 30 MG 24 hr tablet Take 1 tablet (30 mg total) by mouth daily. 30 tablet 11     loratadine (CLARITIN) 10 mg tablet Take 10 mg by mouth daily.       losartan (COZAAR) 100 MG tablet TAKE 1 TABLET BY MOUTH ONCE A DAY 90 tablet 3     nystatin (MYCOSTATIN) cream Apply topically 3 (three) times a day. (Patient taking differently: Apply topically as needed.       ) 30 g 3     OMEGA-3/DHA/EPA/FISH OIL (FISH OIL-OMEGA-3 FATTY ACIDS) 300-1,000 mg capsule Take 2 g by mouth daily.       omeprazole (PRILOSEC) 20 MG capsule Take 20 mg by mouth 2 (two) times a day.       predniSONE (DELTASONE) 2.5 MG tablet 7.5 mg/d prednisone PO for 1 month, reduce to 5 mg/d for the next month, and then reduce to 2.5 mg/d for the third month and then stop.. 90 tablet 2     Current Facility-Administered Medications   Medication Dose Route  "Frequency Provider Last Rate Last Dose     triamcinolone acetonide 40 mg/mL injection 20 mg (KENALOG-40)  20 mg Intra-articular Once Ally Resendiz MBBS         DETAILED EXAMINATION  02/06/20  :  Vitals:    02/06/20 1125   BP: 112/70   Patient Site: Right Arm   Patient Position: Sitting   Cuff Size: Adult Regular   Weight: 196 lb (88.9 kg)   Height: 5' 7\" (1.702 m)     Alert oriented. Head including the face is examined for malar rash, heliotropes, scarring, lupus pernio. Eyes examined for redness such as in episcleritis/scleritis, periorbital lesions.   Neck/ Face examined for parotid gland swelling, range of motion of neck.  Left upper and lower and right upper and lower extremities examined for tenderness, swelling, warmth of the appendicular joints, range of motion, edema, rash.  Some of the important findings included: She has tenderness and swelling such as on her right hand index finger along the flexor tendon with small A1 nodularity, difficulty making a full flexion there.  She has full range of motion of the shoulders.. Her oral mucosa is moist no stomatitis thrush.  She has bilateral TKAs.     LAB / IMAGING DATA:  ALT   Date Value Ref Range Status   07/30/2019 82 (H) 0 - 45 U/L Final   08/20/2018 52 (H) 0 - 45 U/L Final   08/16/2017 54 (H) 0 - 45 U/L Final     Albumin   Date Value Ref Range Status   07/30/2019 3.5 3.5 - 5.0 g/dL Final   08/20/2018 3.6 3.5 - 5.0 g/dL Final   08/16/2017 3.5 3.5 - 5.0 g/dL Final     Creatinine   Date Value Ref Range Status   08/22/2019 0.96 0.60 - 1.10 mg/dL Final   07/30/2019 0.88 0.60 - 1.10 mg/dL Final   08/20/2018 0.80 0.60 - 1.10 mg/dL Final       WBC   Date Value Ref Range Status   08/22/2019 5.4 4.0 - 11.0 thou/uL Final   07/30/2019 4.7 4.0 - 11.0 thou/uL Final     Hemoglobin   Date Value Ref Range Status   08/22/2019 13.3 12.0 - 16.0 g/dL Final   07/30/2019 12.8 12.0 - 16.0 g/dL Final   08/20/2018 12.4 12.0 - 16.0 g/dL Final     Platelets   Date Value Ref Range Status "   08/22/2019 211 140 - 440 thou/uL Final   07/30/2019 219 140 - 440 thou/uL Final   08/20/2018 223 140 - 440 thou/uL Final       Lab Results   Component Value Date    RF 53.1 (H) 04/06/2016    SEDRATE 33 (H) 08/20/2018

## 2021-06-09 NOTE — PROGRESS NOTES
Assessment/Plan:     1. Burning with urination  Most likely related to postmenopausal atrophic vaginitis.  Will await confirmatory urinary culture.  - Urinalysis-UC if Indicated  - Culture, Urine    2. Postmenopausal atrophic vaginitis  Increase in symptoms likely due to discontinuation of topical Premarin cream.  Will instead try Vagifem estradiol tablets daily for 2 weeks and then twice weekly into the vagina.  Discussed anticipated results.  May try Replens over-the-counter vaginal moisturizer in the meantime.    3. Chest pain  We discussed broad differential diagnosis.  Referral made for pharmacologic stress test.  Will obtain labs as noted.  - Electrocardiogram Perform and Read  - XR Chest PA and Lateral  - NM Pharmacologic Stress Test; Future    4. Lightheadedness  Mild, will assess further with labs as noted.  - HM2(CBC w/o Differential)  - Comprehensive Metabolic Panel      Subjective:      Carolina García is a 72 y.o. female presented to clinic today with a few concerns.  For the past few months has had exterior vaginal burning fairly consistent.  This began around the time that she had to discontinue her topical Premarin due to lack of insurance coverage and significant increase in cost.  Denies urinary frequency or urgency.  Bowels are moving normally.    This morning she awoke with chest discomfort that was burning and pressure-like in character on the left side of her chest.  It woke her from sleep.  It lasted 15-20 minutes and resolved on its own accord and has not returned despite usual morning activities.  She has a history of hypertension.  She also has a history of esophageal reflux for which he takes Prilosec twice daily, no recent missed doses, and states this feels different from her reflux symptoms.  She has a history of anxiety but does not identify any current anxiety.  We review that she had a stress test, nuclear pharmacologic, 2010 that was normal and an echocardiogram at that same time  showing mild LVH and impaired relaxation.  At that time she was having dyspnea on exertion and different symptoms.  She denies any shortness of breath, edema, palpitations, or change in exertional tolerance.  Activity level limited by bilateral knee pain.  Over the past 2 months has intermittently had some lightheadedness with standing, attributes this to ongoing neck difficulties and cervical disc disease.  This is not associated with exertion, chest pain, palpitations, or shortness of breath.  Sometimes has to sit down as she feels like she could pass out.    Current Outpatient Prescriptions   Medication Sig Dispense Refill     amLODIPine (NORVASC) 10 MG tablet Take 1 tablet (10 mg total) by mouth daily. As directed 90 tablet 3     CARBOXYMETHYLCELL/HYPROMELLOSE (GENTEAL GEL OPHT) Apply 2 drops to eye as needed.       cholecalciferol, vitamin D3, (VITAMIN D3) 4,000 unit cap Take by mouth.       citalopram (CELEXA) 20 MG tablet Take 1 tablet (20 mg total) by mouth daily. 90 tablet 4     clotrimazole (LOTRIMIN) 1 % cream Apply topically 2 (two) times a day. 45 g 1     ibuprofen (ADVIL) 200 MG tablet Take 400 mg by mouth 2 (two) times a day.       loratadine (CLARITIN) 10 mg tablet Take 10 mg by mouth daily.       losartan (COZAAR) 100 MG tablet TAKE ONE TABLET BY MOUTH ONCE DAILY AS DIRECTED 90 tablet 1     nystatin (MYCOSTATIN) cream Apply 1 application topically as needed.        OMEGA-3/DHA/EPA/FISH OIL (FISH OIL-OMEGA-3 FATTY ACIDS) 300-1,000 mg capsule Take 2 g by mouth daily.       omeprazole (PRILOSEC) 20 MG capsule Take 20 mg by mouth 2 (two) times a day.       conjugated estrogens (PREMARIN) vaginal cream INSERT 0.5 G INTO VAGINA DAILY FOR 2 WEEKS THEN 1-3 TIMES WEEKLY TO CONTROL SYMPTOMS 30 g 2     diclofenac sodium (VOLTAREN) 1 % Gel 1 g tid 100 g 1     estradiol (VAGIFEM) 10 mcg Tab Insert one tab into the vagina daily for 2 weeks, then twice weekly 60 tablet 1     No current facility-administered  "medications for this visit.        Past Medical History, Family History, and Social History reviewed.  Past Medical History:   Diagnosis Date     GERD (gastroesophageal reflux disease)      Hypertension      Neck pain      Osteoarthritis 1/13/2016     Systemic lupus      Past Surgical History:   Procedure Laterality Date     ANAL FISSURECTOMY       BREAST BIOPSY Right 2005    benign     HYSTERECTOMY  1986     IL GASTROPLASTY,OBESITY,OTHER      Description: Gastric Surgery For Morbid Obesity Gastric Stapling;  Recorded: 04/16/2012;     IL REMOVAL GALLBLADDER      Description: Cholecystectomy;  Recorded: 04/16/2012;     [place] [facility]    Procedure:  C45 MOBI-C DISC ARTHORPLASTY, ATTEMPT MOBI C C56, ANTERIOR CERVICAL DISCECTOMY AND FUSION IF UNABLE TO PLACE ;  Surgeon: Korina Beltrán MD;  Location: [facility];  Service: Spine     REPLACEMENT TOTAL KNEE BILATERAL       Codeine and Egg white  Family History   Problem Relation Age of Onset     Breast cancer Sister 52     Cancer Sister      Breast cancer Sister 60     Social History     Social History     Marital status:      Spouse name: N/A     Number of children: 3     Years of education: N/A     Occupational History     Retired      [employer]      Social History Main Topics     Smoking status: Never Smoker     Smokeless tobacco: Never Used     Alcohol use Yes      Comment: glass of wine on occasion     Drug use: No     Sexual activity: Yes     Partners: Male     Other Topics Concern     Not on file     Social History Narrative         Review of systems is as stated in HPI, and the remainder of the 10 system review is otherwise negative.    Objective:     Vitals:    03/13/17 0931   BP: 120/60   Patient Site: Left Arm   Patient Position: Sitting   Cuff Size: Adult Large   Pulse: 68   Resp: 20   Temp: 98.5  F (36.9  C)   TempSrc: Oral   Weight: 189 lb (85.7 kg)   Height: 5' 7\" (1.702 m) "    Body mass index is 29.6 kg/(m^2).    Alert female.  Mucous members moist.  Oropharynx clear.  Neck supple without lymphadenopathy.  Heart with regular rate and rhythm.  Lungs are clear and well aerated.  Abdomen is soft and nontender.  Atrophic vaginitis noted, no vaginal discharge, irritation, or rashes.  Extremities without edema.    I ordered and personally reviewed a 2 view chest x-ray which is within normal limits.    I ordered personally reviewed a 12-lead EKG which reveals normal sinus rhythm.  There is some flattening of the T waves really no significant change from prior EKG June 2015.      This note has been dictated using voice recognition software. Any grammatical or context distortions are unintentional and inherent to the the software.

## 2021-06-10 NOTE — PROGRESS NOTES
Patient ID: Carolina García is a 75 y.o. female.  /64   Pulse 87   Temp 98.1  F (36.7  C)   Wt 184 lb 12.8 oz (83.8 kg)   LMP 01/22/1986   SpO2 99%   BMI 28.94 kg/m      Assessment/Plan:                   Diagnoses and all orders for this visit:    Epigastric abdominal pain  -     HM1(CBC and Differential)  -     Lipase  -     Comprehensive Metabolic Panel  -     HM1 (CBC with Diff)  -     omeprazole (PRILOSEC) 20 MG capsule; Take 2 capsules (40 mg total) by mouth 2 (two) times a day before meals.  Dispense: 120 capsule; Refill: 1    Sjogren's syndrome with keratoconjunctivitis sicca (H)  -     Ambulatory referral to Rheumatology    Other orders  -     Cancel: Td, Preservative Free (green label)  -     Cancel: Basic Metabolic Panel  -     Cancel: Lipid Cascade      DISCUSSION  Her clinical history suggest she has significant irritation in the stomach likely from a combination of aspirin and ibuprofen.  She has no sign or symptom on initial evaluation to warrant hospitalization or other immediate action.    Stop aspirin, stop ibuprofen, use acetaminophen for pain relief.    Increase omeprazole to 40 mg twice daily.    Obtain additional labs and give strong consideration to endoscopy based on test results.  Subjective:     HPI    Carolina García is a 75 y.o. female she is here today concerned regarding several weeks of epigastric abdominal pain that is become persistent.  She has ongoing nausea, reduced appetite, feeling full after just eating a few bites of food.  Her weight has perhaps dropped somewhat.  She does not report hematemesis hematochezia or melena.  Reports constipation.  She does take ibuprofen on an ongoing basis.  She takes a baby aspirin.  She takes omeprazole 60 mg total daily 40 in the morning and 20 later in the day.  She has not had endoscopy recently.  She has a history of bariatric surgery, exact details uncertain.  She takes ibuprofen regularly for management of arthritis  pain.    Review of Systems  Complete review of systems is obtained.  Other than the specific considerations noted above complete review of systems is negative.          Objective:   Medications:  Current Outpatient Medications   Medication Sig     amLODIPine (NORVASC) 10 MG tablet Take 1 tablet (10 mg total) by mouth daily. As directed     CARBOXYMETHYLCELL/HYPROMELLOSE (GENTEAL GEL OPHT) Apply 2 drops to eye as needed.     cevimeline (EVOXAC) 30 mg capsule Take 1 capsule (30 mg total) by mouth 2 (two) times a day.     citalopram (CELEXA) 20 MG tablet TAKE 1 & 1/2 TABLETS BY MOUTH ONCE A DAY     clotrimazole (LOTRIMIN) 1 % cream APPLY  CREAM EXTERNALLY TWICE DAILY (Patient taking differently: as needed.       )     cyanocobalamin, vitamin B-12, (VITAMIN B12 ORAL) Take by mouth daily.            isosorbide mononitrate (IMDUR) 30 MG 24 hr tablet Take 1 tablet (30 mg total) by mouth daily.     loratadine (CLARITIN) 10 mg tablet Take 10 mg by mouth daily.     losartan (COZAAR) 100 MG tablet TAKE 1 TABLET BY MOUTH ONCE A DAY     nystatin (MYCOSTATIN) cream Apply topically 3 (three) times a day. (Patient taking differently: Apply topically as needed.       )     OMEGA-3/DHA/EPA/FISH OIL (FISH OIL-OMEGA-3 FATTY ACIDS) 300-1,000 mg capsule Take 2 g by mouth daily.     omeprazole (PRILOSEC) 20 MG capsule Take 2 capsules (40 mg total) by mouth 2 (two) times a day before meals.       Allergies:  Allergies   Allergen Reactions     Hydroxychloroquine Shortness Of Breath     Codeine      Emesis, tired     Egg White      Dumping syndrome       Tobacco:   reports that she has never smoked. She has never used smokeless tobacco.     Physical Exam          /64   Pulse 87   Temp 98.1  F (36.7  C)   Wt 184 lb 12.8 oz (83.8 kg)   LMP 01/22/1986   SpO2 99%   BMI 28.94 kg/m          General Appearance:    Alert, cooperative, no distress   Eyes:   No scleral icterus or conjunctival irritation       Throat:   Lips, mucosa, and  tongue normal; teeth and gums normal   Neck:   Supple, symmetrical, trachea midline, no adenopathy;        thyroid:  No enlargement/tenderness/nodules   Lungs:     Clear to auscultation bilaterally, respirations unlabored, no wheezes or crackles   Heart:    Regular rate and hythm,  No murmur   Abdomen:    Soft, no distention, she reports rather significant tenderness in the epigastric region, no palpable masses organomegaly.  No rebound tenderness or guarding     Extremities:  No edema, no joint swelling or redness, no evidence of any injuries   Skin:  No concerning skin findings, no suspicious moles, no rashes   Neurologic:  On gross examination there is no motor or sensory deficit.  Patient walks with a normal gait

## 2021-06-10 NOTE — TELEPHONE ENCOUNTER
Refill Approved    Rx renewed per Medication Renewal Policy. Medication was last renewed on 8/25/19.    Elzbieta Cavazos, [facility] Triage/Med Refill 8/25/2020     Requested Prescriptions   Pending Prescriptions Disp Refills     citalopram (CELEXA) 20 MG tablet [Pharmacy Med Name: CITALOPRAM HBR 20 MG TABLET] 135 tablet 3     Sig: TAKE 1 & 1/2 TABLETS BY MOUTH ONCE A DAY       SS[facility] Refill Protocol  Passed - 8/23/2020  9:36 AM        Passed - PCP or prescribing provider visit in last year     Last office visit with prescriber/PCP: 8/10/2020 Rich Kelly MD OR same dept: 8/10/2020 Rich Kelly MD OR same specialty: 8/10/2020 Rich Kelly MD  Last physical: Visit date not found Last MTM visit: Visit date not found   Next visit within 3 mo: Visit date not found  Next physical within 3 mo: Visit date not found  Prescriber OR PCP: Rich Kelly MD  Last diagnosis associated with med order: 1. Anxiety  - citalopram (CELEXA) 20 MG tablet [Pharmacy Med Name: CITALOPRAM HBR 20 MG TABLET]; TAKE 1 & 1/2 TABLETS BY MOUTH ONCE A DAY  Dispense: 135 tablet; Refill: 3    If protocol passes may refill for 12 months if within 3 months of last provider visit (or a total of 15 months).

## 2021-06-10 NOTE — TELEPHONE ENCOUNTER
Patient has a lab appointment next week.  Looks like its to follow up on elevated LFT's  Please place orders.  Thank you!

## 2021-06-11 NOTE — TELEPHONE ENCOUNTER
Refill Approved    Rx renewed per Medication Renewal Policy. Medication was last renewed on 9/8/19.    Elzbieta Cavazos, [facility] Triage/Med Refill 9/1/2020     Requested Prescriptions   Pending Prescriptions Disp Refills     amLODIPine (NORVASC) 10 MG tablet [Pharmacy Med Name: AMLODIPINE BESYLATE 10 MG TAB] 90 tablet 3     Sig: TAKE 1 TABLET (10 MG TOTAL) BY MOUTH DAILY. AS DIRECTED       Calcium-Channel Blockers Protocol Passed - 8/30/2020  9:45 AM        Passed - PCP or prescribing provider visit in past 12 months or next 3 months     Last office visit with prescriber/PCP: 8/10/2020 Rich Kelly MD OR same dept: 8/10/2020 Rich Kelly MD OR same specialty: 8/10/2020 Rich Kelly MD  Last physical: Visit date not found Last MTM visit: Visit date not found   Next visit within 3 mo: Visit date not found  Next physical within 3 mo: Visit date not found  Prescriber OR PCP: Rich Kelly MD  Last diagnosis associated with med order: 1. Essential hypertension  - amLODIPine (NORVASC) 10 MG tablet [Pharmacy Med Name: AMLODIPINE BESYLATE 10 MG TAB]; Take 1 tablet (10 mg total) by mouth daily. As directed  Dispense: 90 tablet; Refill: 3    If protocol passes may refill for 12 months if within 3 months of last provider visit (or a total of 15 months).             Passed - Blood pressure filed in past 12 months     BP Readings from Last 1 Encounters:   08/10/20 118/64

## 2021-06-11 NOTE — TELEPHONE ENCOUNTER
Refill Approved    Rx renewed per Medication Renewal Policy. Medication was last renewed on 9/24/2019.    Mildred Stephen, [facility] Triage/Med Refill 9/21/2020     Requested Prescriptions   Pending Prescriptions Disp Refills     losartan (COZAAR) 100 MG tablet [Pharmacy Med Name: LOSARTAN POTASSIUM 100 MG TAB] 90 tablet 3     Sig: TAKE 1 TABLET BY MOUTH ONCE A DAY       Angiotensin Receptor Blocker Protocol Passed - 9/20/2020  9:43 AM        Passed - PCP or prescribing provider visit in past 12 months       Last office visit with prescriber/PCP: 8/10/2020 Rich Kelly MD OR same dept: 8/10/2020 Rich Kelly MD OR same specialty: 8/10/2020 Rich Kelly MD  Last physical: Visit date not found Last MTM visit: Visit date not found   Next visit within 3 mo: Visit date not found  Next physical within 3 mo: Visit date not found  Prescriber OR PCP: Rich Kelly MD  Last diagnosis associated with med order: 1. Hypertension  - losartan (COZAAR) 100 MG tablet [Pharmacy Med Name: LOSARTAN POTASSIUM 100 MG TAB]; TAKE 1 TABLET BY MOUTH ONCE A DAY  Dispense: 90 tablet; Refill: 3    If protocol passes may refill for 12 months if within 3 months of last provider visit (or a total of 15 months).             Passed - Serum potassium within the past 12 months     Lab Results   Component Value Date    Potassium 3.8 08/24/2020             Passed - Blood pressure filed in past 12 months     BP Readings from Last 1 Encounters:   08/10/20 118/64             Passed - Serum creatinine within the past 12 months     Creatinine   Date Value Ref Range Status   08/24/2020 0.86 0.60 - 1.10 mg/dL Final

## 2021-06-11 NOTE — TELEPHONE ENCOUNTER
Orders being requested: Endoscopy   Reason service is needed/diagnosis: Dr. Kelly and patient Carolina García discussed the last time she was in clinic. She wants to have the endoscopy   When are orders needed by: ASAP   Where to send Orders: System so she can call scheduling to get scheduled   Okay to leave detailed message?  Yes       Who is calling:  Patient was in to see Dr. Kelly last month and they were debating whether or not to have an endoscopy. She is supposed to let Dr. Kelly know that she wants the endoscopy and needs someone to put in order for her to have the endoscopy.   Reason for Call:  See above   Date of last appointment with primary care: N/A   Okay to leave a detailed message: Yes. Patient will schedule with when order is put in for the endoscopy. Please do ASAP. Call patient to let know she can schedule with [facility]. Thank you!

## 2021-06-12 NOTE — PATIENT INSTRUCTIONS - HE
Summary of Your Rheumatology Visit    Next Appointment:  2 Months    Medications:    Please follow directives on pill bottle on how to take medication(s) provided.        Referrals:    Occupational therapy    Tests:     Please have labs that were ordered performed.     Recheck liver enzymes in about 6 weeks.    Injections:        Other:      Suggest looking into obtaining a paraffin wax machine for hand pains.

## 2021-06-12 NOTE — TELEPHONE ENCOUNTER
----- Message from Westley Townsend DO sent at 11/1/2020  3:02 AM CST -----  Although complement levels are a bit low, levels are stable when trending over time.    Borderline elevated urine microalbumin level, currentlevel not worrisome.    Sedimentation rate and CRP levels are nonspecific inflammatory markers which need to be correlated clinically.  Current sedimentation rate is fine when corrected for patient's age/gender (stable compared to prior level).  CRP level is within normal limits.    Liver enzymes still noted to be elevated, not new with levels fluctuating, recommend periodic monitoring and discussing further with your primary care physician.  Recommend rechecking hepatic panel a few days prior to next follow-up visit with us (on 12/23/20).    Otherwise remainder of lab results noted to be stable.    Please place lab orders mentioned above.

## 2021-06-12 NOTE — PROGRESS NOTES
Carolina García who presents today with a chief complaint of  No chief complaint on file.      Joint Pains: Yes  Location: right hand index finger  Onset: Chronic, 3 yrs  Intensity:  Can be 8/10  AM Stiffness: Minutes  Alleviating/Aggravating Factors: Diclofenac gel helpful.    Tolerating Meds: Yes  Other:      ROS:  Patient denies having: persistent dry eyes, dry mouth, recurrent oral ulcers, patchy alopecia, active rashes, photosensitivity, history of psoriasis, active chest pain, active shortness of breath, active cough, active dysuria, history of kidney stones, active abdominal pain, active diarrhea, history of hematochezia, active dysphagia, history of peptic ulcer disease, history of HIV, tuberculosis, hepatitis B or C, Lyme disease, seizure history, raynaud's, active documented fevers, recent infections, difficulty sleeping or chronic unrefreshing sleep, involuntary weight loss, loss of appetite, excessive fatigue, depression, anxiety,  recurrent sinus infections, history of inflammatory eye diseases (such as uveitis, scleritis, iritis, etc).     Information gathered by medical assistant incorporated into this note, was reviewed and discussed with the patient.    Problem List:  Patient Active Problem List   Diagnosis     Anxiety     Hypertension     Rotator Cuff Tendonitis     Esophageal Reflux     Allergic Rhinitis     Lower Back Pain Chronic     Vitamin D Deficiency     Menopause Has Occurred     Cervical radiculopathy at C5     Myelopathy, spondylogenic, cervical     Subacute cutaneous lupus erythematosus     Hypocomplementemia (H)     Sjogren's syndrome (H)     Primary osteoarthritis involving multiple joints     THEE positive     Localized primary carpometacarpal osteoarthritis     Abnormal nuclear stress test     Precordial chest pain        PMH:   Past Medical History:   Diagnosis Date     GERD (gastroesophageal reflux disease)      Hypertension      Neck pain      Osteoarthritis 1/13/2016     Systemic  lupus (H)        Surgical History:  Past Surgical History:   Procedure Laterality Date     ANAL FISSURECTOMY       BREAST BIOPSY Right 2005    benign     CV CORONARY ANGIOGRAM N/A 8/22/2019    Procedure: Coronary Angiogram;  Surgeon: Melissa Vasquez MD;  Location: [facility];  Service: Cardiology     CV LEFT HEART CATHETERIZATION WO LEFT VETRICULOGRAM Left 8/22/2019    Procedure: Left Heart Catheterization Without Left Ventriculogram;  Surgeon: Melissa Vasquez MD;  Location: [facility];  Service: Cardiology     HYSTERECTOMY  1986     [place] GASTROPLASTY,OBESITY,OTHER      Description: Gastric Surgery For Morbid Obesity Gastric Stapling;  Recorded: 04/16/2012;     [place] REMOVAL GALLBLADDER      Description: Cholecystectomy;  Recorded: 04/16/2012;     [place] TOT DISC ARTHRP ART DISC ANT APPRO 1 NTRSPC CRV N/A 6/17/2015    Procedure:  C45 MOBI-C DISC ARTHORPLASTY, ATTEMPT MOBI C C56, ANTERIOR CERVICAL DISCECTOMY AND FUSION IF UNABLE TO PLACE ;  Surgeon: Korina Beltrán MD;  Location: [facility];  Service: Spine     REPLACEMENT TOTAL KNEE BILATERAL         Family History:  Family History   Problem Relation Age of Onset     Breast cancer Sister 52     Cancer Sister      Breast cancer Sister 60       Social History:   reports that she has never smoked. She has never used smokeless tobacco. She reports current alcohol use. She reports that she does not use drugs.    Allergies:  Allergies   Allergen Reactions     Hydroxychloroquine Shortness Of Breath     Codeine      Emesis, tired     Egg White      Dumping syndrome        Current Medications:  Current Outpatient Medications   Medication Sig Dispense Refill     amLODIPine (NORVASC) 10 MG tablet TAKE 1 TABLET (10 MG TOTAL) BY MOUTH DAILY. AS DIRECTED 90 tablet 3     CARBOXYMETHYLCELL/HYPROMELLOSE (GENTEAL GEL OPHT) Apply 2 drops to eye as needed.       cevimeline (EVOXAC) 30 mg capsule TAKE 1 CAPSULE BY MOUTH TWICE A DAY 60 capsule 0     citalopram  (CELEXA) 20 MG tablet TAKE 1 & 1/2 TABLETS BY MOUTH ONCE A  tablet 3     clotrimazole (LOTRIMIN) 1 % cream APPLY  CREAM EXTERNALLY TWICE DAILY (Patient taking differently: as needed.       ) 45 g 1     cyanocobalamin, vitamin B-12, (VITAMIN B12 ORAL) Take by mouth daily.              isosorbide mononitrate (IMDUR) 30 MG 24 hr tablet Take 1 tablet (30 mg total) by mouth daily. 30 tablet 11     loratadine (CLARITIN) 10 mg tablet Take 10 mg by mouth daily.       losartan (COZAAR) 100 MG tablet TAKE 1 TABLET BY MOUTH ONCE A DAY 90 tablet 3     nystatin (MYCOSTATIN) cream Apply topically 3 (three) times a day. (Patient taking differently: Apply topically as needed.       ) 30 g 3     OMEGA-3/DHA/EPA/FISH OIL (FISH OIL-OMEGA-3 FATTY ACIDS) 300-1,000 mg capsule Take 2 g by mouth daily.       omeprazole (PRILOSEC) 20 MG capsule Take 2 capsules (40 mg total) by mouth 2 (two) times a day before meals. 120 capsule 1     No current facility-administered medications for this visit.            Physical Exam:  Following up today via video visit, per Covid-19 pandemic requirements.    Verbal consent has been obtained for this service by care team member.    Video call start time: 1:22 PM    Video call end time: 2:04 PM    "[employer] utilized for video call.    Phone number utilized: [[phone]]    Patient location for video visit: Home     Provider location for video visit:  Home (working remotely)        Summary/Assessment:      Was a patient of Dr. Resendiz, last seen February 2020.    History that includes Sjogren's, osteoarthritis, subcutaneous lupus.    Presents today complaining of having right fifth PIP pain.  X-rays performed by orthopedics showed signs of moderate DJD involving this joint.  Patient to having some DJD involving other joints of right hip.  Received cortisone injection by orthopedics about 5 months ago with good benefit however pain resurfacing.    Utilizes Voltaren gel periodically with some  relief.    Has chronic transaminitis perhaps related to NSAID use.  There was increase in baseline levels couple of months ago, patient discontinued Aleve resulting in some improvement.    States height is 5 foot 7 weighs 179 pounds, perhaps also has component of fatty liver playing a role.    Admits to having sicca symptoms.  Was taking Evoxac with some benefit however too costly, desires to try another medication, does not recall trying pilocarpine.  Did not tolerate Plaquenil well.    Currently not taking any calcium or vitamin D, takes a supplement.      Not much benefit with Tylenol    Please see below for management plan.    Pertinent rheumatology/past medical history (please refer to above for more detailed history):      Sjogren's    Subcutaneous lupus (biopsy positive per patient)    Elevated rheumatoid factor    Osteoarthritis    Chronic right fifth digit/PIP pain      Rheumatology medications provided/suggested:    Pilocarpine  Diclofenac gel      Pertinent medication from other providers or from otc (please refer to above for more detailed med list):    Celexa  Multivitamin    Pertinent medications already tried:     Evoxac (costly)  Hydroxychloroquine (intolerance)      Pertinent lab history:    Elevated: THEE, SSA/SSB antibodies, rheumatoid factor    Negative/unremarkable:  CCP antibody, other THEE related subsets, hep B&C      Pertinent imaging/test history:    2020: X-rays performed at orthopedics showed signs of osteoarthritis involving right fifth PIP joint and other joints involving this second.      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    Continue conservative measures for Sjogren's.    Suggested utilizing diclofenac gel once a day.  If liver enzymes continue to improve and are close to 50 range, can  utilize twice daily as needed.    Will refer to OT.    Suggested looking into a paraffin wax machine.    Will obtain some labs and correlate clinically.  Unable to order vitamin D level with patient's insurance plan.    Recheck liver enzymes in about 6 weeks.    Follow-up in 2 months.        Procedure note:       Major side effect profile of medications provided/suggested were discussed with the patient.    This note was transcribed using Dragon voice recognition software as a result unintentional grammatical errors or word substitutions may have occurred. Please contact our Rheumatology department if you need any clarification or if you have any related inquiries.    Thank you for referring this patient to our clinic.      Westley Townsend DO      ....................  10/20/2020   11:21 AM

## 2021-06-12 NOTE — PROGRESS NOTES
Assessment and Plan:     1. Healthcare maintenance  Age appropriate health care screening and guidance discussed including nutrition, exercise, immunization updates and vitamin supplementation.   Screening labs and exams ordered below.   Recommended bone density follow-up which patient declined at this time.   Recommended shingles vaccine patient declines due to stating it will not be covered.  Will check with her pharmacy insurance plan.  Recommend continue with yearly mammograms patient is Jose Carlos had this year.  - Pneumococcal conjugate vaccine 13-valent 6wks-17yrs; >50yrs  - Comprehensive Metabolic Panel  - Lipid Cascade FASTING    2. Vitamin D deficiency  We will get labs as below per patient's request.  - Vitamin D, Total (25-Hydroxy)    3. Subacute cutaneous lupus erythematosus  To need to follow with specialist.  - Hemoglobin    4. Essential hypertension with goal blood pressure less than 140/90  Well-controlled plan to continue medications labs as above    5. Gastroesophageal reflux disease without esophagitis  Control plan to continue with medications.      The patient's current medical problems were reviewed.    I have had an Advance Directives discussion with the patient.  The following health maintenance schedule was reviewed with the patient and provided in printed form in the after visit summary:   Health Maintenance   Topic Date Due     ADVANCE DIRECTIVES DISCUSSED WITH PATIENT  01/18/1963     ZOSTER VACCINE  01/18/2007     DXA SCAN  08/12/2016     INFLUENZA VACCINE RULE BASED (1) 08/01/2017     FALL RISK ASSESSMENT  08/16/2018     MAMMOGRAM  02/08/2019     TD 18+ HE  04/29/2020     COLONOSCOPY  09/23/2024     PNEUMOCOCCAL POLYSACCHARIDE VACCINE AGE 65 AND OVER  Completed     PNEUMOCOCCAL CONJUGATE VACCINE FOR ADULTS (PCV13 OR PREVNAR)  Completed        Subjective:   Chief Complaint: Carolina García is an 72 y.o. female here for an Annual Wellness visit.   HPI: Comes in today for annual wellness  visit.  Feels well overall.  She has stopped estrogens been off it for 12 months is using some over-the-counter lubricants seems to be working well.  She follows with rheumatologist for lupus and dry eyes.  She started over-the-counter B12.  Reviewed last visit notes labs.  Reviewed last bone density mammogram.  Up-to-date on colon cancer screening.  No other new concerns    Review of Systems:    Please see above.  The rest of the review of systems are negative for all systems.    Patient Care Team:  Cristy Queen DO as PCP - General (Family Medicine)     Patient Active Problem List   Diagnosis     Anxiety     Hypertension     Rotator Cuff Tendonitis     Esophageal Reflux     Allergic Rhinitis     Lower Back Pain Chronic     Vitamin D Deficiency     Menopause Has Occurred     Cervical radiculopathy at C5     Myelopathy, spondylogenic, cervical     Subacute cutaneous lupus erythematosus     Hypocomplementemia     Osteoarthritis     Past Medical History:   Diagnosis Date     GERD (gastroesophageal reflux disease)      Hypertension      Neck pain      Osteoarthritis 1/13/2016     Systemic lupus       Past Surgical History:   Procedure Laterality Date     ANAL FISSURECTOMY       BREAST BIOPSY Right 2005    benign     HYSTERECTOMY  1986     [place] GASTROPLASTY,OBESITY,OTHER      Description: Gastric Surgery For Morbid Obesity Gastric Stapling;  Recorded: 04/16/2012;     [place] REMOVAL GALLBLADDER      Description: Cholecystectomy;  Recorded: 04/16/2012;     [place] TOT DISC ARTHRP ART DISC ANT APPRO 1 NTRSPC CRV N/A 6/17/2015    Procedure:  C45 MOBI-C DISC ARTHORPLASTY, ATTEMPT MOBI C C56, ANTERIOR CERVICAL DISCECTOMY AND FUSION IF UNABLE TO PLACE ;  Surgeon: Korina Beltrán MD;  Location: [facility];  Service: Spine     REPLACEMENT TOTAL KNEE BILATERAL        Family History   Problem Relation Age of Onset     Breast cancer Sister 52     Cancer Sister      Breast cancer Sister 60      Social History     Social History      "Marital status:      Spouse name: N/A     Number of children: 3     Years of education: N/A     Occupational History     Retired      [employer]      Social History Main Topics     Smoking status: Never Smoker     Smokeless tobacco: Never Used     Alcohol use Yes      Comment: glass of wine on occasion     Drug use: No     Sexual activity: Yes     Partners: Male     Other Topics Concern     Not on file     Social History Narrative      Current Outpatient Prescriptions   Medication Sig Dispense Refill     amLODIPine (NORVASC) 10 MG tablet TAKE ONE TABLET BY MOUTH ONCE DAILY AS DIRECTED 90 tablet 3     CARBOXYMETHYLCELL/HYPROMELLOSE (GENTEAL GEL OPHT) Apply 2 drops to eye as needed.       cevimeline (EVOXAC) 30 mg capsule Take 30 mg by mouth 2 (two) times a day.       cholecalciferol, vitamin D3, (VITAMIN D3) 4,000 unit cap Take by mouth.       citalopram (CELEXA) 20 MG tablet Take 1 tablet (20 mg total) by mouth daily. 90 tablet 4     clotrimazole (LOTRIMIN) 1 % cream Apply topically 2 (two) times a day. 45 g 1     diclofenac sodium (VOLTAREN) 1 % Gel 1 g tid 100 g 1     ibuprofen (ADVIL) 200 MG tablet Take 400 mg by mouth 2 (two) times a day.       loratadine (CLARITIN) 10 mg tablet Take 10 mg by mouth daily.       losartan (COZAAR) 100 MG tablet TAKE ONE TABLET BY MOUTH ONCE DAILY 90 tablet 1     nystatin (MYCOSTATIN) cream Apply 1 application topically as needed.        OMEGA-3/DHA/EPA/FISH OIL (FISH OIL-OMEGA-3 FATTY ACIDS) 300-1,000 mg capsule Take 2 g by mouth daily.       omeprazole (PRILOSEC) 20 MG capsule Take 20 mg by mouth 2 (two) times a day.       No current facility-administered medications for this visit.       Objective:   Vital Signs:   Visit Vitals     /60 (Patient Site: Right Arm, Patient Position: Sitting, Cuff Size: Adult Large)     Pulse 84     Ht 5' 7\" (1.702 m)     Wt 191 lb 6.4 oz (86.8 kg)     LMP 01/22/1986     BMI 29.98 kg/m2        VisionScreening:  No exam " data present     PHYSICAL EXAM  General Appearance:    Alert, cooperative, no distress, appears stated age    Head:    Normocephalic, without obvious abnormality, atraumatic   Eyes:    PERRL, EOM's intact, no conjunctivitis    Ears:    Normal TM's and external ear canals   Nose:   Mucosa normal, no drainage     or sinus tenderness   Throat:   Oropharynx is clear   Neck:   Supple, symmetrical, no adenopathy, no thyromegally, no carotid bruit        Lungs:     Clear to auscultation bilaterally, respirations unlabored   Chest Wall:    No tenderness or deformity, no axillary lymphadenopathy no changes or abnormalities of breasts.    Heart:    Regular rate and rhythm, S1 and S2 normal, no murmur, rub    or gallop       Abdomen:     Soft, non-tender, bowel sounds active all four quadrants,     no masses, no organomegaly           Extremities:   Extremities normal, atraumatic, no cyanosis or edema   Pulses:   2+ and symmetric all extremities   Neuro:   cranial nerves grossly intact, grossly normal sensation and reflexes in extremities    Psych:   grossly normal mood and affect without acute anxiety or psychosis    Skin:   No rashes or lesions   Pelvic:  Declines pelvic exam       Assessment Results 8/16/2017   Activities of Daily Living No help needed   Instrumental Activities of Daily Living No help needed   Mini Cog Total Score 4   Some recent data might be hidden     A Mini-Cog score of 0-2 suggests the possibility of dementia, score of 3-5 suggests no dementia    Identified Health Risks:     She is at risk for lack of exercise and has been provided with information to increase physical activity for the benefit of her well-being.  The patient was counseled and encouraged to consider modifying their diet and eating habits. She was provided with information on recommended healthy diet options.  Patient's advanced directive was discussed and I am comfortable with the patient's wishes.

## 2021-06-13 NOTE — PROGRESS NOTES
Discharge Summary  Patient Name: Carolina García  Date: 1/25/2021  Referral Diagnosis: chronic pain of right hand  Referring provider: Westley Townsend*  Visit Diagnosis:   1. Pain of finger of right hand     2. Right hand weakness     3. Decreased activities of daily living (ADL)     4. Finger stiffness, right         Goal status: not met  Patient Will Demonstrate / Verbalize independence in self-management of condition in: 2 weeks  Patient will be independent with home exercise program in: 2 weeks  Patient will be able to: lift;carry;with less pain;for housework;in 12 weeks  Patient will perform: meal preparation;with less pain;with less difficulty;in 12 weeks  Patient will be able to  & pinch: for dressing;for hygiene;with less pain;with less difficulty;in 12 weeks  Patient will improve hand/finger coordination for: writing;fasteners;with less pain;with less difficulty;in 12 weeks      Patient was seen for 2 visits between 11-16-20 and 12-11-20.    Therapy will be discontinued at this time.  The patient will need a new referral to resume.    Thank you for your referral.  Angela Jacobson  1/25/2021   12:11 PM    Occupational Therapy Daily Progress     Patient Name: Carolina García  Date: 12/11/2020  Visit #: 2  Referral Diagnosis: chronic pain of right hand  Referring provider: Westley Townsend*  Visit Diagnosis:     ICD-10-CM    1. Pain of finger of right hand  M79.644    2. Right hand weakness  R29.898    3. Decreased activities of daily living (ADL)  Z78.9    4. Finger stiffness, right  M25.641        Assessment:     Patient is appropriate to continue with skilled occupational therapy intervention, as indicated by initial plan of care. Patient reports that her finger is about the same.    Goal Status:  Patient Will Demonstrate / Verbalize independence in self-management of condition in: 2 weeks  Patient will be independent with home exercise program in: 2 weeks  Patient will be able to:  lift;carry;with less pain;for housework;in 12 weeks  Patient will perform: meal preparation;with less pain;with less difficulty;in 12 weeks  Patient will be able to  & pinch: for dressing;for hygiene;with less pain;with less difficulty;in 12 weeks  Patient will improve hand/finger coordination for: writing;fasteners;with less pain;with less difficulty;in 12 weeks      Plan / Patient Education:     PROM to right 2nd PIP and ergonomics/activity modification, paraffin    Subjective:     Pain rating at rest: 0  Pain rating with activity: 8      Objective:     Treatment Today:   Patient reports that the paraffin was not helpful. The X-span finger sleeve and the Oval 8 were both helpful in decreasing the swelling. Changed Oval 8 to size 11 as I did not have that size available last visit and she had a size 12 that slipped off easily. She will continue to wear at night with X-span finger tubing. Patient shown where to obtain X-span tubing. Patient instructed on gentle stretch to right index finger and will perform twice a day.  TREATMENT MINUTES COMMENTS   Evaluation     Self-care/ Home management     Manual therapy 3 X-span   Neuromuscular Re-education     Therapeutic Exercises 10    Iontophoresis     Orthotic Fitting 10    Total 23    Blank areas are intentional and mean the treatment did not include these items.       Angela Jacobson  12/11/2020  11:03 AM

## 2021-06-14 NOTE — PROGRESS NOTES
Carolina García who presents today with a chief complaint of  No chief complaint on file.      Joint Pains: yes  Location: shoulders  Onset: chronic   Intensity:  7/10  AM Stiffness: couple hours  Alleviating/Aggravating Factors: PT helps pt fingers pain  Tolerating Meds: yes  Other: per pt, pharmacy states wanting the provider to order 90 days supplies of pilocarpine instead of 60 days. PT does help for pt fingers. Pt having difficulty lifting her arms up.      ROS:  Patient denies having any chest pain, shortness of breath, cough, abdominal pain, nausea, vomiting, rashes, fevers, oral ulcers and recent infections.  Patient admits to having a good appetite       Problem List:  Patient Active Problem List   Diagnosis     Anxiety     Hypertension     Rotator Cuff Tendonitis     Esophageal Reflux     Allergic Rhinitis     Lower Back Pain Chronic     Vitamin D Deficiency     Menopause Has Occurred     Cervical radiculopathy at C5     Myelopathy, spondylogenic, cervical     Subacute cutaneous lupus erythematosus     Hypocomplementemia (H)     Sjogren's syndrome (H)     Primary osteoarthritis involving multiple joints     THEE positive     Localized primary carpometacarpal osteoarthritis     Abnormal nuclear stress test     Precordial chest pain        PMH:   Past Medical History:   Diagnosis Date     GERD (gastroesophageal reflux disease)      Hypertension      Neck pain      Osteoarthritis 1/13/2016     Systemic lupus (H)        Surgical History:  Past Surgical History:   Procedure Laterality Date     ANAL FISSURECTOMY       BREAST BIOPSY Right 2005    benign     CV CORONARY ANGIOGRAM N/A 8/22/2019    Procedure: Coronary Angiogram;  Surgeon: Melissa Vasquez MD;  Location: [facility];  Service: Cardiology     CV LEFT HEART CATHETERIZATION WO LEFT VETRICULOGRAM Left 8/22/2019    Procedure: Left Heart Catheterization Without Left Ventriculogram;  Surgeon: Melissa Vasquez MD;  Location: [facility];   Service: Cardiology     HYSTERECTOMY  1986     MA GASTROPLASTY,OBESITY,OTHER      Description: Gastric Surgery For Morbid Obesity Gastric Stapling;  Recorded: 04/16/2012;     MA REMOVAL GALLBLADDER      Description: Cholecystectomy;  Recorded: 04/16/2012;     MA TOT DISC ARTHRP ART DISC ANT APPRO 1 NTRSPC CRV N/A 6/17/2015    Procedure:  C45 MOBI-C DISC ARTHORPLASTY, ATTEMPT MOBI C C56, ANTERIOR CERVICAL DISCECTOMY AND FUSION IF UNABLE TO PLACE ;  Surgeon: Korina Beltrán MD;  Location: [facility];  Service: Spine     REPLACEMENT TOTAL KNEE BILATERAL         Family History:  Family History   Problem Relation Age of Onset     Breast cancer Sister 52     Cancer Sister      Breast cancer Sister 60       Social History:   reports that she has never smoked. She has never used smokeless tobacco. She reports current alcohol use. She reports that she does not use drugs.    Allergies:  Allergies   Allergen Reactions     Hydroxychloroquine Shortness Of Breath     Codeine      Emesis, tired     Egg White      Dumping syndrome        Current Medications:  Current Outpatient Medications   Medication Sig Dispense Refill     amLODIPine (NORVASC) 10 MG tablet TAKE 1 TABLET (10 MG TOTAL) BY MOUTH DAILY. AS DIRECTED 90 tablet 3     CARBOXYMETHYLCELL/HYPROMELLOSE (GENTEAL GEL OPHT) Apply 2 drops to eye as needed.       citalopram (CELEXA) 20 MG tablet TAKE 1 & 1/2 TABLETS BY MOUTH ONCE A  tablet 3     clotrimazole (LOTRIMIN) 1 % cream APPLY  CREAM EXTERNALLY TWICE DAILY (Patient taking differently: as needed.       ) 45 g 1     cyanocobalamin, vitamin B-12, (VITAMIN B12 ORAL) Take by mouth daily.              isosorbide mononitrate (IMDUR) 30 MG 24 hr tablet Take 1 tablet (30 mg total) by mouth daily. 30 tablet 11     loratadine (CLARITIN) 10 mg tablet Take 10 mg by mouth daily.       losartan (COZAAR) 100 MG tablet TAKE 1 TABLET BY MOUTH ONCE A DAY 90 tablet 3     nystatin (MYCOSTATIN) cream Apply topically 3 (three)  times a day. (Patient taking differently: Apply topically as needed.       ) 30 g 3     OMEGA-3/DHA/EPA/FISH OIL (FISH OIL-OMEGA-3 FATTY ACIDS) 300-1,000 mg capsule Take 2 g by mouth daily.       omeprazole (PRILOSEC) 20 MG capsule Take 2 capsules (40 mg total) by mouth 2 (two) times a day before meals. 120 capsule 1     pilocarpine (SALAGEN) 5 MG tablet Take 1 tablet p.o. tid, prn 90 tablet 2     No current facility-administered medications for this visit.            Physical Exam:  Following up today via video visit, per Covid-19 pandemic requirements.    Verbal consent has been obtained for this service by care team member.    Video call start time: 9:40 AM    Video call end time: 10:01 AM    [employer] utilized for video call.    Phone number utilized: [[phone]]    Patient location for video visit: Home     Provider location for video visit:  Home (working remotely)      Summary/Assessment:    History that includes Sjogren's, osteoarthritis, subcutaneous lupus.    Presents for follow-up visit.    Claims to be doing better with exercises provided by OT along with wearing a splint that stabilizes her right second digit at night.    Finds pilocarpine to be more effective than Evoxac and its less expensive pricing.  Desires 90-day supply.    Utilizing diclofenac gel only about a couple times per week.    Liver enzymes still noted to be elevated, latest level stable compared to August 2020.  Elevation of unclear etiology, has not seen GI, is interested in referral.    From prior note: Was a patient of Dr. Resendiz, last seen February 2020.    History that includes Sjogren's, osteoarthritis, subcutaneous lupus.    Presents today complaining of having right fifth PIP pain.  X-rays performed by orthopedics showed signs of moderate DJD involving this joint.  Patient to having some DJD involving other joints of right hip.  Received cortisone injection by orthopedics about 5 months ago with good benefit however pain  resurfacing.    Utilizes Voltaren gel periodically with some relief.    Has chronic transaminitis perhaps related to NSAID use.  There was increase in baseline levels couple of months ago, patient discontinued Aleve resulting in some improvement.    States height is 5 foot 7 weighs 179 pounds, perhaps also has component of fatty liver playing a role.    Admits to having sicca symptoms.  Was taking Evoxac with some benefit however too costly, desires to try another medication, does not recall trying pilocarpine.  Did not tolerate Plaquenil well.    Currently not taking any calcium or vitamin D, takes a supplement.      Not much benefit with Tylenol    Pertinent rheumatology/past medical history (please refer to above for more detailed history):      Sjogren's    Subcutaneous lupus (biopsy positive per patient)    Elevated rheumatoid factor    Osteoarthritis    Chronic right fifth digit/PIP pain    Transaminitis      Rheumatology medications provided/suggested:    Pilocarpine  Diclofenac gel      Pertinent medication from other providers or from otc (please refer to above for more detailed med list):    Celexa  Multivitamin    Pertinent medications already tried:     Evoxac (costly)  Hydroxychloroquine (intolerance)      Pertinent lab history:    Elevated: THEE, SSA/SSB antibodies, rheumatoid factor    Negative/unremarkable:  CCP antibody, other THEE related subsets, hep B&C      Pertinent imaging/test history:    2020: X-rays performed at orthopedics showed signs of osteoarthritis involving right fifth PIP joint and other joints involving this second.      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    Unable to order vitamin D level based on patient's insurance.    Plan:      Continue pilocarpine 5 mg 3 times daily as needed.     Continue conservative measures for Sjogren's.    On prior visit, suggested  utilizing diclofenac gel no more than once a day.  Currently utilizing just few times per week.  If liver enzymes improve and are close to 50 range, can utilize twice daily as needed.    Continue exercises provided by OT, as tolerated.    Wears brace at night for right index pain, helpful.    Will refer to GI for chronic transaminitis.    Recheck labs prior to next visit.  Has standing order for labs every 4 months, will add urine periodically..    Follow-up in 4 months.    .    This note was transcribed using Dragon voice recognition software as a result unintentional grammatical errors or word substitutions may have occurred. Please contact our Rheumatology department if you need any clarification or if you have any related inquiries.    Major side effect profile of medications provided were discussed with the patient.      Westley Townsend DO    ....................  12/22/2020   4:24 PM

## 2021-06-14 NOTE — PATIENT INSTRUCTIONS - HE
Summary of Your Rheumatology Visit    Next Appointment:  4 Months    Medications:    Please follow directives on pill bottle on how to take medication(s) provided.    Referrals:    Gastroenterology    Tests:     Please have labs performed a few days prior to next visit.  Next labs should include a urine sample, recommend drinking sufficient amount of fluids prior.    Injections:      Other:

## 2021-06-14 NOTE — TELEPHONE ENCOUNTER
Patient referred to gastroenterology due to chronically elevated liver enzymes.    Patient desires to have copy of lab results.     Please send copy of latest lab results along with trend of liver panel/results over the past year.

## 2021-06-15 ENCOUNTER — COMMUNICATION - HEALTHEAST (OUTPATIENT)
Dept: RHEUMATOLOGY | Facility: CLINIC | Age: 76
End: 2021-06-15

## 2021-06-15 DIAGNOSIS — M35.01 SJOGREN'S SYNDROME WITH KERATOCONJUNCTIVITIS SICCA (H): ICD-10-CM

## 2021-06-15 RX ORDER — PILOCARPINE HYDROCHLORIDE 5 MG/1
TABLET, FILM COATED ORAL
Qty: 180 TABLET | Refills: 0 | Status: SHIPPED | OUTPATIENT
Start: 2021-06-15 | End: 2021-07-15

## 2021-06-15 NOTE — TELEPHONE ENCOUNTER
Pt notified of lab results. Pt will go to [facility] lab to have these labs drawn.     Lab orders entered in chart.

## 2021-06-15 NOTE — TELEPHONE ENCOUNTER
----- Message from Westley Townsend DO sent at 2/2/2021  1:11 AM CST -----  Still noted to have elevated liver enzymes with elevated total bilirubin level.  Recommend discussing further with a gastroenterologist.  On last visit referral to gastroenterology placed.    Given elevated total protein level, recommend checking SPEP/UPEP.    Noted to have slight low white blood cell count with normal components, recommend rechecking CBC with differential in 1 to 2 months.    Please place lab orders mentioned above.

## 2021-06-16 PROBLEM — R10.9 NONSPECIFIC ABDOMINAL PAIN: Status: ACTIVE | Noted: 2020-10-09

## 2021-06-16 PROBLEM — R76.8 ANA POSITIVE: Status: ACTIVE | Noted: 2018-08-20

## 2021-06-16 PROBLEM — M19.049 LOCALIZED PRIMARY CARPOMETACARPAL OSTEOARTHRITIS: Status: ACTIVE | Noted: 2018-10-24

## 2021-06-16 PROBLEM — R11.2 NAUSEA AND VOMITING: Status: ACTIVE | Noted: 2021-04-29

## 2021-06-16 PROBLEM — R94.39 ABNORMAL NUCLEAR STRESS TEST: Status: ACTIVE | Noted: 2019-08-15

## 2021-06-16 PROBLEM — M15.0 PRIMARY OSTEOARTHRITIS INVOLVING MULTIPLE JOINTS: Status: ACTIVE | Noted: 2018-08-20

## 2021-06-16 PROBLEM — M35.00 SJOGREN'S SYNDROME (H): Status: ACTIVE | Noted: 2018-08-20

## 2021-06-16 PROBLEM — R79.89 ABNORMAL LIVER FUNCTION TESTS: Status: ACTIVE | Noted: 2021-04-29

## 2021-06-17 NOTE — PROGRESS NOTES
Patient ID: Carolina García is a 76 y.o. female.  /64   Pulse 97   Wt 176 lb 11.2 oz (80.2 kg)   LMP 01/22/1986   SpO2 99%   BMI 27.68 kg/m      Assessment/Plan:                   Diagnoses and all orders for this visit:    Pre-syncope  -     HM1(CBC and Differential)    Hypomagnesemia  -     Comprehensive Metabolic Panel  -     Magnesium    Elevated LFTs  -     Comprehensive Metabolic Panel    Hyponatremia  -     Comprehensive Metabolic Panel    Anemia, unspecified type  -     HM1(CBC and Differential)    Essential hypertension  -     amLODIPine (NORVASC) 10 MG tablet; Take 0.5 tablets (5 mg total) by mouth daily. As directed  Dispense: 90 tablet; Refill: 3        DISCUSSION  I suspect the biggest contributing factor to her symptoms of presyncope and dizziness is overtreatment of blood pressure.  We will reduce amlodipine down to 5 mg and monitor.  Obtain additional labs as noted and make further plans if needed.  Subjective:     HPI    Carolina García is a 76 y.o. female she has a history of Sjogren's syndrome as well as previous gastric bypass.  She is on medications for treatment of hypertension.  She presented to the emergency department on April 23 with a presyncopal spell.  She is clear to state to me she did not actually pass out but nearly passed out.  She was brought to the emergency department for evaluation.  That information is reviewed.  She was discharged home after having magnesium supplementation.  She had several life threat disturbances as well as a mild anemia.  She is maintained on losartan and amlodipine.  Blood pressure tends to run in the range from 90 up to 115 systolic.  Discussed that potential overtreatment of blood pressure may be a factor with how she felt and feels.  She does describe having bouts of dizziness most common in the morning.  She also reports some intermittent headaches that are not severe.  She otherwise overall feels well.  She does have a history of elevated  liver function tests and has seen gastroenterology.  She requests repeat testing of her AST and ALT today.    Review of Systems  Complete review of systems is obtained.  Other than the specific considerations noted above complete review of systems is negative.          Objective:   Medications:  Current Outpatient Medications   Medication Sig Note     acetaminophen (TYLENOL) 500 MG tablet Take 500 mg by mouth 2 (two) times a day.      amLODIPine (NORVASC) 10 MG tablet Take 0.5 tablets (5 mg total) by mouth daily. As directed      CARBOXYMETHYLCELL/HYPROMELLOSE (GENTEAL GEL OPHT) Apply 2 drops to eye as needed.      cevimeline (EVOXAC) 30 mg capsule Take 1 capsule by mouth daily.  4/23/2021: Prescribed as two times a day     citalopram (CELEXA) 20 MG tablet TAKE 1 & 1/2 TABLETS BY MOUTH ONCE A DAY      diclofenac sodium (VOLTAREN) 1 % Gel Apply 2 g topically daily as needed.      loratadine (CLARITIN) 10 mg tablet Take 10 mg by mouth daily.      losartan (COZAAR) 100 MG tablet TAKE 1 TABLET BY MOUTH ONCE A DAY      multivitamin therapeutic tablet Take 1 tablet by mouth daily.      OMEGA-3/DHA/EPA/FISH OIL (FISH OIL-OMEGA-3 FATTY ACIDS) 300-1,000 mg capsule Take 2 g by mouth daily.      omeprazole (PRILOSEC) 20 MG capsule Take 2 capsules (40 mg total) by mouth 2 (two) times a day before meals.      pilocarpine (SALAGEN) 5 MG tablet Take 1 tablet p.o. tid, prn (Patient taking differently: Take 1 tablet p.o. two times a day prn DR Townsend from 4/29 visit)      polyvinyl alcohol (LIQUIFILM TEARS) 1.4 % ophthalmic solution Administer 1 drop to both eyes as needed for dry eyes.        Allergies:  Allergies   Allergen Reactions     Hydroxychloroquine Shortness Of Breath     Codeine      Emesis, tired     Egg White      Dumping syndrome       Tobacco:   reports that she has never smoked. She has never used smokeless tobacco.     Physical Exam      /64   Pulse 97   Wt 176 lb 11.2 oz (80.2 kg)   LMP 01/22/1986    SpO2 99%   BMI 27.68 kg/m      General Appearance:    Alert, cooperative, no distress   Eyes:   No scleral icterus or conjunctival irritation       Lungs:     Clear to auscultation bilaterally, respirations unlabored, no wheezes or crackles   Heart:    Regular rate and rhythm,  No murmur   Extremities:  No edema, no joint swelling or redness, no evidence of any injuries   Skin:  No concerning skin findings, no suspicious moles, no rashes   Neurologic:  On gross examination there is no motor or sensory deficit.  Patient walks with a normal gait

## 2021-06-17 NOTE — PATIENT INSTRUCTIONS - HE
Reduce the dose of amlodipine down to 1/2 tablet daily.    I will let you know when the lab test results return.    Based on how you feel with the medication change in the repeat lab tests we will decide if we need to take any additional steps.

## 2021-06-17 NOTE — PATIENT INSTRUCTIONS - HE
Summary of Your Rheumatology Visit    Next Appointment:   4 Months    Medications:    Decrease pilocarpine to 1 tablet twice a day as needed.  If still experiencing dizziness despite work-up from primary care physician, patient can try trial of decreasing pilocarpine further and see if this improves dizziness.    Referrals:      Tests:        Injections:      Other:

## 2021-06-17 NOTE — TELEPHONE ENCOUNTER
Reason for Call:  Other call back      Detailed comments: Recvd call from pt/Carolina, she was prescribed amlodipine 10 MG tablet (taking 0.5 tablet daily), she is having a hard time cutting this pill in half as it shatters. Carolina would like to know if she could get this RX in a 5 MG so she does not have to cut it in half    Phone Number Patient can be reached at: Home number on file [phone] (home)    Best Time: anytime    Can we leave a detailed message on this number?: Yes    Call taken on 5/3/2021 at 10:45 AM by Yaritza Persaud

## 2021-06-17 NOTE — PROGRESS NOTES
Carolina García who presents today with a chief complaint of  No chief complaint on file.      Joint Pains: yes  Location:b/l hands  Onset: couple years  Intensity:  /10  AM Stiffness: Minutes  Alleviating/Aggravating Factors: hand brace help  Tolerating Meds:yes  Other:      ROS:  Patient denies having any chest pain, shortness of breath, cough, abdominal pain, nausea, vomiting, rashes, fevers, oral ulcers and recent infections.  Patient admits to having a good appetite      Problem List:  Patient Active Problem List   Diagnosis     Anxiety     Hypertension     Rotator Cuff Tendonitis     Esophageal Reflux     Allergic Rhinitis     Lower Back Pain Chronic     Vitamin D Deficiency     Menopause Has Occurred     Cervical radiculopathy at C5     Myelopathy, spondylogenic, cervical     Subacute cutaneous lupus erythematosus     Hypocomplementemia (H)     Sjogren's syndrome (H)     Primary osteoarthritis involving multiple joints     THEE positive     Localized primary carpometacarpal osteoarthritis     Abnormal nuclear stress test     Precordial chest pain        PMH:   Past Medical History:   Diagnosis Date     GERD (gastroesophageal reflux disease)      Hypertension      Neck pain      Osteoarthritis 1/13/2016     Systemic lupus (H)        Surgical History:  Past Surgical History:   Procedure Laterality Date     ANAL FISSURECTOMY       BREAST BIOPSY Right 2005    benign     CV CORONARY ANGIOGRAM N/A 8/22/2019    Procedure: Coronary Angiogram;  Surgeon: Melissa Vasquez MD;  Location: [facility];  Service: Cardiology     CV LEFT HEART CATHETERIZATION WO LEFT VETRICULOGRAM Left 8/22/2019    Procedure: Left Heart Catheterization Without Left Ventriculogram;  Surgeon: Melissa Vasquez MD;  Location: [facility];  Service: Cardiology     HYSTERECTOMY  1986     AL GASTROPLASTY,OBESITY,OTHER      Description: Gastric Surgery For Morbid Obesity Gastric Stapling;  Recorded: 04/16/2012;     AL REMOVAL  GALLBLADDER      Description: Cholecystectomy;  Recorded: 04/16/2012;     [facility]    Procedure:  C45 MOBI-C DISC ARTHORPLASTY, ATTEMPT MOBI C C56, ANTERIOR CERVICAL DISCECTOMY AND FUSION IF UNABLE TO PLACE ;  Surgeon: Korina Beltrán MD;  Location: [facility];  Service: Spine     REPLACEMENT TOTAL KNEE BILATERAL         Family History:  Family History   Problem Relation Age of Onset     Breast cancer Sister 52     Cancer Sister      Breast cancer Sister 60       Social History:   reports that she has never smoked. She has never used smokeless tobacco. She reports current alcohol use. She reports that she does not use drugs.    Allergies:  Allergies   Allergen Reactions     Hydroxychloroquine Shortness Of Breath     Codeine      Emesis, tired     Egg White      Dumping syndrome        Current Medications:  Current Outpatient Medications   Medication Sig Dispense Refill     acetaminophen (TYLENOL) 500 MG tablet Take 500 mg by mouth 2 (two) times a day.       amLODIPine (NORVASC) 10 MG tablet TAKE 1 TABLET (10 MG TOTAL) BY MOUTH DAILY. AS DIRECTED 90 tablet 3     CARBOXYMETHYLCELL/HYPROMELLOSE (GENTEAL GEL OPHT) Apply 2 drops to eye as needed.       cevimeline (EVOXAC) 30 mg capsule Take 1 capsule by mouth daily.        citalopram (CELEXA) 20 MG tablet TAKE 1 & 1/2 TABLETS BY MOUTH ONCE A  tablet 3     diclofenac sodium (VOLTAREN) 1 % Gel Apply 2 g topically daily as needed.       loratadine (CLARITIN) 10 mg tablet Take 10 mg by mouth daily.       losartan (COZAAR) 100 MG tablet TAKE 1 TABLET BY MOUTH ONCE A DAY 90 tablet 3     multivitamin therapeutic tablet Take 1 tablet by mouth daily.       OMEGA-3/DHA/EPA/FISH OIL (FISH OIL-OMEGA-3 FATTY ACIDS) 300-1,000 mg capsule Take 2 g by mouth daily.       omeprazole (PRILOSEC) 20 MG capsule Take 2 capsules (40 mg total) by mouth 2 (two) times a day before meals. 120 capsule 1     pilocarpine (SALAGEN) 5  MG tablet Take 1 tablet p.o. tid, prn 270 tablet 1     polyvinyl alcohol (LIQUIFILM TEARS) 1.4 % ophthalmic solution Administer 1 drop to both eyes as needed for dry eyes.       No current facility-administered medications for this visit.            Physical Exam:  Following up today via video visit, per Covid-19 pandemic requirements.    Verbal consent has been obtained for this service by care team member.    Video call start time: 12:06 PM    Video call end time: 12:25 PM    Doximity utilized for video call.    Phone number utilized: [[phone]]    Patient location for video visit: Home     Provider location for video visit:  Home (working remotely)      Summary/Assessment:    History that includes Sjogren's, osteoarthritis, subcutaneous lupus.    Presents for follow-up visit.    States her Sjogren's has been stable, responding to pilocarpine 5 mg 3 times a day.    Patient adds that recently had syncopal episode while shopping at [employer], taken to emergency room.  Syncopal episode of unclear etiology at this time, per patient.  Has follow-up with PCP later this week.    Patient adds has been experiencing some dizziness lately.  Some electrolyte abnormalities noted on labs performed in ER.    Utilizing diclofenac gel only about once per week.    Liver enzymes still noted to be elevated, latest levels stable, states saw GI had ultrasound performed which showed some abnormalities, told to investigate further (also noted on letter, scanned in system), patient states she called however never heard back.  Elevation of unclear etiology at this time.    Please see below for management plan.    From prior note: Was a patient of Dr. Resendiz, last seen February 2020.    History that includes Sjogren's, osteoarthritis, subcutaneous lupus.    Presents today complaining of having right fifth PIP pain.  X-rays performed by orthopedics showed signs of moderate DJD involving this joint.  Patient to having some DJD involving  other joints of right hip.  Received cortisone injection by orthopedics about 5 months ago with good benefit however pain resurfacing.    Utilizes Voltaren gel periodically with some relief.    Has chronic transaminitis perhaps related to NSAID use.  There was increase in baseline levels couple of months ago, patient discontinued Aleve resulting in some improvement.    States height is 5 foot 7 weighs 179 pounds, perhaps also has component of fatty liver playing a role.    Admits to having sicca symptoms.  Was taking Evoxac with some benefit however too costly, desires to try another medication, does not recall trying pilocarpine.  Did not tolerate Plaquenil well.    Currently not taking any calcium or vitamin D, takes a supplement.      Not much benefit with Tylenol    Pertinent rheumatology/past medical history (please refer to above for more detailed history):      Sjogren's    Subcutaneous lupus (biopsy positive per patient)    Elevated rheumatoid factor    Osteoarthritis    Chronic right fifth digit/PIP pain    Transaminitis      Rheumatology medications provided/suggested:    Pilocarpine  Diclofenac gel      Pertinent medication from other providers or from otc (please refer to above for more detailed med list):    Celexa  Multivitamin    Pertinent medications already tried:     Evoxac (costly and not as beneficial as pilocarpine)  Hydroxychloroquine (intolerance)      Pertinent lab history:    Elevated: THEE, SSA/SSB antibodies, rheumatoid factor    Negative/unremarkable:  CCP antibody, other THEE related subsets, hep B&C      Pertinent imaging/test history:    2020: X-rays performed at orthopedics showed signs of osteoarthritis involving right fifth PIP joint and other joints involving this second.      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    Unable to order vitamin D level based on  patient's insurance.      Plan:      Given syncopal episode, recommend decreasing pilocarpine from 5 mg 3 times a day to twice a day.  Although syncope listed as potential side effect of pilocarpine, recommend she have full work-up and discuss further with PCP later this week.  Pilocarpine has been very beneficial for patient.    Also recommended she discuss low calcium and electrolyte abnormalities further with PCP, perhaps contributing to dizziness.    Also made aware and noted on AVS.  If still experiencing dizziness despite work-up from primary care physician, patient can try trial of decreasing pilocarpine further and see if this improves dizziness.    Continue conservative measures for Sjogren's.    Continue diclofenac gel on a very limited basis, no more than 1-2 times per per week.  If liver enzymes improve will consider increasing as needed frequency.    Recommended she reach out again to gastroenterology to follow-up regarding chronically elevated liver enzymes and  ultrasound performed.    Continue exercises provided by OT, as tolerated.    Wears brace at night for right index pain, helpful.    Follow-up in 4 months.        Total time spent 30 minutes involved with patient care, includes placing orders, reviewing records and and formulating management plan.    This note was transcribed using Dragon voice recognition software as a result unintentional grammatical errors or word substitutions may have occurred. Please contact our Rheumatology department if you need any clarification or if you have any related inquiries.  .      Westley Townsend DO  ...................  4/27/2021   10:57 AM

## 2021-06-19 NOTE — PROGRESS NOTES
"ASSESSMENT AND PLAN:  Carolina García 73 y.o. female is seen here on 08/20/18 for establishment here for Sjogren's syndrome.  This is manifested by sickle symptoms, positive THEE, positive SSA and SSB antibodies, low complements.  She has noted some improvement with secretagogues.  She does not seem to have inflammatory arthropathy.  She has osteoarthritis.  Management principles were reviewed.  In the past she is at first CMC injection with corticosteroids indications were reviewed.  She is to have labs as noted.  Return for follow-up in 3 months or sooner.       Diagnoses and all orders for this visit:    Sjogren's syndrome with keratoconjunctivitis sicca (H)  -     cevimeline (EVOXAC) 30 mg capsule; Take 1 capsule (30 mg total) by mouth 2 (two) times a day.  Dispense: 180 capsule; Refill: 1  -     HM1(CBC and Differential)  -     Creatinine  -     ALT (SGPT)  -     Albumin  -     CCP Antibodies  -     Hepatitis C Antibody (Anti-HCV)  -     DNA (ds) Antibody Screen  -     Complement, C'4  -     Complement, C'3  -     Erythrocyte Sedimentation Rate  -     C-Reactive Protein  -     Hepatitis B Surface Antigen (HBsAG)  -     HM1 (CBC with Diff)    Primary osteoarthritis involving multiple joints    Hypocomplementemia (H)    THEE positive      HISTORY OF PRESENTING ILLNESS:  Carolina García, 73 y.o., female is here for establishment of care for shortness syndrome.  She has had these symptoms going on for the past 3 or 4 years.  These comprise of sickle symptoms affecting her eyes, mouth.  She has had suboptimal dental health and has problems \"and almost all her teeth\".  She describes her dryness of mouth which is progressive.  This interferes with her swallowing.  This wakes her up from sleep.  She did not tolerate pilocarpine.  Cevimeline was helpful.  She was not so sure until she stopped taking it and had more dryness.  She has not had a parotid area swelling although an MRI done for evaluation of C-spine did " incidentally note parotid swelling this is noted in the chart which is reviewed.  She reports no features suggestive of oral thrush.  She does not have numbness tingling of the toes her upper extremities.  She has joint pains more at the bases of the thumbs worse with activity such as trying to open a jar which she hardly can do now.  She has noted no sustained stiffness in the morning or swelling in her joints.  She is able do all her day-to-day activities without significant impairment.  She has noted fatigue, dryness of nose, sores sore tongue, history of night sweats.  There is no family history of ankylosing spondylitis, Sjogren's.  Sister is noted to have rheumatoid arthritis.  She is not a smoker does not take alcohol.  She has had knee replacement 2007 2008.  She is known to have cervical disc disease and had a disc placement in 2016.  No personal family history of psoriasis ulcerative colitis Crohn's disease. Further historical information and ADL limitations as noted in the multidimensional health assessment questionnaire attached in the EMR. Rest of the 13 system ROS is negative.     ALLERGIES:Codeine and Egg white    PAST MEDICAL/ACTIVE PROBLEMS/MEDICATION/ FAMILY HISTORY/SOCIAL DATA:  The patient has a family history of  Past Medical History:   Diagnosis Date     GERD (gastroesophageal reflux disease)      Hypertension      Neck pain      Osteoarthritis 1/13/2016     Systemic lupus (H)      History   Smoking Status     Never Smoker   Smokeless Tobacco     Never Used     Patient Active Problem List   Diagnosis     Anxiety     Hypertension     Rotator Cuff Tendonitis     Esophageal Reflux     Allergic Rhinitis     Lower Back Pain Chronic     Vitamin D Deficiency     Menopause Has Occurred     Cervical radiculopathy at C5     Myelopathy, spondylogenic, cervical     Subacute cutaneous lupus erythematosus     Hypocomplementemia (H)     Sjogren's syndrome (H)     Primary osteoarthritis involving multiple  joints     THEE positive     Current Outpatient Prescriptions   Medication Sig Dispense Refill     amLODIPine (NORVASC) 10 MG tablet TAKE ONE TABLET BY MOUTH ONCE DAILY AS DIRECTED 90 tablet 3     CARBOXYMETHYLCELL/HYPROMELLOSE (GENTEAL GEL OPHT) Apply 2 drops to eye as needed.       citalopram (CELEXA) 20 MG tablet TAKE ONE TABLET BY MOUTH ONCE DAILY 90 tablet 2     clotrimazole (LOTRIMIN) 1 % cream APPLY CREAM TOPICALLY TWICE DAILY 45 g 1     cyanocobalamin, vitamin B-12, (VITAMIN B12 ORAL) Take by mouth.       diclofenac sodium (VOLTAREN) 1 % Gel 1 g tid 100 g 1     ibuprofen (ADVIL) 200 MG tablet Take 400 mg by mouth 2 (two) times a day.       loratadine (CLARITIN) 10 mg tablet Take 10 mg by mouth daily.       losartan (COZAAR) 100 MG tablet TAKE ONE TABLET BY MOUTH ONCE DAILY 90 tablet 0     nystatin (MYCOSTATIN) cream Apply 1 application topically as needed.        OMEGA-3/DHA/EPA/FISH OIL (FISH OIL-OMEGA-3 FATTY ACIDS) 300-1,000 mg capsule Take 2 g by mouth daily.       omeprazole (PRILOSEC) 20 MG capsule Take 20 mg by mouth 2 (two) times a day.       cevimeline (EVOXAC) 30 mg capsule Take 1 capsule (30 mg total) by mouth 2 (two) times a day. 180 capsule 1     No current facility-administered medications for this visit.        COMPREHENSIVE EXAMINATION:  Vitals:    08/20/18 0956   BP: 94/62   Patient Site: Right Arm   Patient Position: Sitting   Cuff Size: Adult Regular   Pulse: 80   Weight: 190 lb (86.2 kg)     A well appearing alert oriented female. Vital data as noted above. Her eyes without inflammation/scleromalacia. ENTwithout oral mucositis, thrush, nasal deformity, external ear redness, deformity. Her neck is without lymphadenopathy and supple. Lungs normal sounds, no pleural rub. Heart auscultation normal rate, rhythm; no pericardial rub and murmurs. Abdomen soft, non tender, no organomegaly. Skin examined for heliotrope, malar area eruption, lupus pernio, periungual erythema, sclerodactyly, papules,  erythema nodosum, purpura, nail pitting, onycholysis, and obvious psoriasis lesion. Neurological examination shows normal alertness, speech, facial symmetry, tone and power in upper and lower extremities, Tinel's and Phalen's at wrist and gait. The joint examination is performed for swelling, tenderness, warmth, erythema, and range of motion in the following joints: DIPs, PIPs, MCPs, wrists, first CMC's, elbows, shoulders, hips, knees, ankles, feet; spine for range of motion and paraspinal muscles for tenderness. The salient normal / abnormal findings are appended.  She has Heberden nodes, squaring of the first CMC with tenderness and minimal grinding.  She has dryness of her mouth there is no pool of saliva.  There is no oral thrush.  No lymph nodes in the submandibular cervical areas.  No parotid area swelling.    LAB / IMAGING DATA:  ALT   Date Value Ref Range Status   08/16/2017 54 (H) 0 - 45 U/L Final   03/13/2017 53 (H) 0 - 45 U/L Final   04/06/2016 47 (H) 0 - 45 U/L Final     Albumin   Date Value Ref Range Status   08/16/2017 3.5 3.5 - 5.0 g/dL Final   03/13/2017 3.7 3.5 - 5.0 g/dL Final   04/06/2016 3.5 3.5 - 5.0 g/dL Final     Creatinine   Date Value Ref Range Status   08/16/2017 0.95 0.60 - 1.10 mg/dL Final   03/13/2017 1.05 0.60 - 1.10 mg/dL Final   04/06/2016 0.83 0.60 - 1.10 mg/dL Final       WBC   Date Value Ref Range Status   03/13/2017 6.2 4.0 - 11.0 thou/uL Final   04/06/2016 5.0 4.0 - 11.0 thou/uL Final     Hemoglobin   Date Value Ref Range Status   08/16/2017 12.8 12.0 - 16.0 g/dL Final   03/13/2017 13.7 12.0 - 16.0 g/dL Final   04/06/2016 13.4 12.0 - 16.0 g/dL Final     Platelets   Date Value Ref Range Status   03/13/2017 239 140 - 440 thou/uL Final   04/06/2016 245 140 - 440 thou/uL Final   01/13/2016 272 140 - 440 thou/uL Final       Lab Results   Component Value Date    RF 53.1 (H) 04/06/2016    SEDRATE 27 (H) 04/06/2016

## 2021-06-19 NOTE — LETTER
Letter by Rich Kelly MD at      Author: Rich Kelly MD Service: -- Author Type: --    Filed:  Encounter Date: 8/2/2019 Status: (Other)         Carolina García  [address]  [facility] [address]             August 2, 2019         Dear Ms. García,    Below are the results from your recent visit:    Resulted Orders   HM2(CBC w/o Differential)   Result Value Ref Range    WBC 4.7 4.0 - 11.0 thou/uL    RBC 4.25 3.80 - 5.40 mill/uL    Hemoglobin 12.8 12.0 - 16.0 g/dL    Hematocrit 38.3 35.0 - 47.0 %    MCV 90 80 - 100 fL    MCH 30.2 27.0 - 34.0 pg    MCHC 33.5 32.0 - 36.0 g/dL    RDW 13.4 11.0 - 14.5 %    Platelets 219 140 - 440 thou/uL    MPV 6.9 (L) 7.0 - 10.0 fL   Comprehensive Metabolic Panel   Result Value Ref Range    Sodium 133 (L) 136 - 145 mmol/L    Potassium 4.2 3.5 - 5.0 mmol/L    Chloride 101 98 - 107 mmol/L    CO2 25 22 - 31 mmol/L    Anion Gap, Calculation 7 5 - 18 mmol/L    Glucose 100 70 - 125 mg/dL    BUN 6 (L) 8 - 28 mg/dL    Creatinine 0.88 0.60 - 1.10 mg/dL    GFR MDRD Af Amer >60 >60 mL/min/1.73m2    GFR MDRD Non Af Amer >60 >60 mL/min/1.73m2    Bilirubin, Total 0.6 0.0 - 1.0 mg/dL    Calcium 9.1 8.5 - 10.5 mg/dL    Protein, Total 7.6 6.0 - 8.0 g/dL    Albumin 3.5 3.5 - 5.0 g/dL    Alkaline Phosphatase 90 45 - 120 U/L    AST 85 (H) 0 - 40 U/L    ALT 82 (H) 0 - 45 U/L    Narrative    Fasting Glucose reference range is 70-99 mg/dL per  American Diabetes Association (ADA) guidelines.   Lipase   Result Value Ref Range    Lipase 31 0 - 52 U/L       The lab test results do not indicate any reason for the symptoms.  There are some results that we must follow including a mildly low sodium and some slight elevation to your liver numbers.  Neither of these concerns or new and neither would cause any symptom, especially the symptoms you have been having.  For now we should proceed with obtaining the stress test.    Please call with questions or contact us using  MyChart.    Sincerely,        Electronically signed by Rich Kelly MD

## 2021-06-20 ENCOUNTER — HEALTH MAINTENANCE LETTER (OUTPATIENT)
Age: 76
End: 2021-06-20

## 2021-06-20 NOTE — PROGRESS NOTES
" Patient ID: Carolina García is a 73 y.o. female.  /64  Pulse 80  Ht 5' 7\" (1.702 m)  Wt 191 lb (86.6 kg)  LMP 01/22/1986  SpO2 98%  BMI 29.91 kg/m2    Assessment/Plan:                   Diagnoses and all orders for this visit:    Anxiety  -     citalopram (CELEXA) 20 MG tablet; Take 1.5 tablets (30 mg total) by mouth daily.  Dispense: 135 tablet; Refill: 2    Yeast dermatitis    Other orders  -     nystatin (MYCOSTATIN) cream; Apply topically 3 (three) times a day.  Dispense: 30 g; Refill: 3    DISCUSSION  Treat yeast dermatitis with nystatin.  Recommend application of topical over-the-counter hydrocortisone cream in addition to aid in resolution of irritation.    Treat anxiety by increasing citalopram up to 30 mg once daily.  Monitor closely.  If worsens dry mouth will need to consider other options.  If does not help to alleviate anxiety symptoms in the next 3-4 weeks will consider other treatment options.  Subjective:     HPI    Carolina García is a 73 y.o. female medical history significant for Sjogren's syndrome, anxiety, hypertension here today to discuss anxiety and concern for skin rash.    Patient reports she has had a red irritated rash below her breasts.  She has been using Chlortrimazole which helps to some extent.  Has had similar rash before.  Has used treatment previously.  Patient reports that she has anxiety.  She takes citalopram.  Current dose 20 mg.  Patient states she had more profound depression when she first started the medication but more recently feels that it is helped more with anxiety.  Patient reports having some worsened anxiety at night.  She reports that she has stress in her life primarily centered around her 's health condition.  Patient states she will occasionally wake up and have difficulty getting back to sleep.  She does feel that the citalopram has helped with these types of symptoms in the past and would like to try an increase in the dose.  We discussed " "this.  We discussed in relation to her Sjogren's how such medication can potentially worsen dry mouth type symptoms.  She understands this after our discussion but we both agreed it would be reasonable to proceed to control her anxiety symptoms.  She denies any significant depression.    Review of Systems  Complete review of systems is obtained.  Other than the specific considerations noted above complete review of systems is negative.          Objective:   Medications:  Current Outpatient Prescriptions   Medication Sig     amLODIPine (NORVASC) 10 MG tablet TAKE ONE TABLET BY MOUTH ONCE DAILY AS DIRECTED     CARBOXYMETHYLCELL/HYPROMELLOSE (GENTEAL GEL OPHT) Apply 2 drops to eye as needed.     cevimeline (EVOXAC) 30 mg capsule Take 1 capsule (30 mg total) by mouth 2 (two) times a day.     citalopram (CELEXA) 20 MG tablet Take 1.5 tablets (30 mg total) by mouth daily.     clotrimazole (LOTRIMIN) 1 % cream APPLY CREAM TOPICALLY TWICE DAILY     cyanocobalamin, vitamin B-12, (VITAMIN B12 ORAL) Take by mouth.     diclofenac sodium (VOLTAREN) 1 % Gel 1 g tid     ibuprofen (ADVIL) 200 MG tablet Take 400 mg by mouth 2 (two) times a day.     loratadine (CLARITIN) 10 mg tablet Take 10 mg by mouth daily.     losartan (COZAAR) 100 MG tablet TAKE ONE TABLET BY MOUTH ONCE DAILY     nystatin (MYCOSTATIN) cream Apply 1 application topically as needed.      OMEGA-3/DHA/EPA/FISH OIL (FISH OIL-OMEGA-3 FATTY ACIDS) 300-1,000 mg capsule Take 2 g by mouth daily.     omeprazole (PRILOSEC) 20 MG capsule Take 20 mg by mouth 2 (two) times a day.     nystatin (MYCOSTATIN) cream Apply topically 3 (three) times a day.       Allergies:  Allergies   Allergen Reactions     Codeine      Emesis, tired     Egg White      Dumping syndrome       Tobacco:   reports that she has never smoked. She has never used smokeless tobacco.     Physical Exam          /64  Pulse 80  Ht 5' 7\" (1.702 m)  Wt 191 lb (86.6 kg)  LMP 01/22/1986  SpO2 98%  BMI " 29.91 kg/m2      General: Patient alert no signs of distress    Skin: Rash below both breasts consistent of red macular area consistent with yeast dermatitis.

## 2021-06-21 NOTE — LETTER
Letter by Westley Townsend DO at      Author: Westley Townsend DO Service: -- Author Type: --    Filed:  Encounter Date: 12/21/2020 Status: (Other)         Carolina García  [address]  [place] [address]             December 21, 2020         Dear Ms. García,    Below are the results from your recent visit:    Resulted Orders   Hepatic function panel   Result Value Ref Range    Bilirubin, Total 1.1 (H) 0.0 - 1.0 mg/dL    Bilirubin, Direct 0.4 <=0.5 mg/dL    Protein, Total 7.9 6.0 - 8.0 g/dL    Albumin 3.4 (L) 3.5 - 5.0 g/dL    Alkaline Phosphatase 87 45 - 120 U/L     (H) 0 - 40 U/L    ALT 86 (H) 0 - 45 U/L     Some slight improvement noted involving liver enzymes however levels are still elevated.  Slightly elevated total bilirubin component of liver panel.  Recommend discussing further with primary care physician and periodic monitoring.       We can discuss further over upcoming follow-up visit on December 23, 2020.     Sincerely,        Electronically signed by Westley Townsend DO

## 2021-06-21 NOTE — PROGRESS NOTES
"ASSESSMENT AND PLAN:  Carolina García 73 y.o. female is seen here on 10/24/18 for follow-up of Sjogren's syndrome which is manifested by sicca symptoms, predominantly oral, ocular, background of positive THEE SSA antibodies.  She has osteoarthritis.  She has trochanteric bursitis.  She has found some relief with a secretagogues.  She does not have inflammatory arthropathy but her joint pains are more likely due to osteoarthritis such as the bases of the thumbs both sides.  She would like to proceed local injection 20 mg of methylprednisolone was injected into the right first CMC under ultrasound guidance after pros and cons were outlined.  Continue current management.  Return for follow-up.  4 months.       Diagnoses and all orders for this visit:    Sjogren's syndrome with keratoconjunctivitis sicca (H)    Primary osteoarthritis involving multiple joints    THEE positive    Localized primary carpometacarpal osteoarthrosis, unspecified laterality  -     methylPREDNISolone acetate injection 20 mg (DEPO-MEDROL); Inject 0.5 mL (20 mg total) into the joint once.      HISTORY OF PRESENTING ILLNESS:  Carolina García, 73 y.o., female is here for follow-up of Sjogren's syndrome.  She is complaining of pain in her hands especially the right side.  This is moderately severe.  Basis of the thumbs.  Interferes with day-to-day activities.  She is not woken up from sleep because of this.  What wakes her up from sleep is the trochanteric area pain which is bilateral.  On a previous visit she had labs drawn which show positive THEE low complements.  Her SSA antibodies were positive.  Double-stranded DNA antibody is negative.  She has mild leukopenia.  On a previous visit she had noted that symptoms going on for the past 3 or 4 years.  These comprise of sicca symptoms affecting her eyes, mouth.  She has had suboptimal dental health and has problems \"and almost all her teeth\".  She describes her dryness of mouth which is progressive.  " This interferes with her swallowing.  This wakes her up from sleep.  She did not tolerate pilocarpine.  Cevimeline was helpful.  She was not so sure until she stopped taking it and had more dryness.  She has not had a parotid area swelling although an MRI done for evaluation of C-spine did incidentally note parotid swelling this is noted in the chart which is reviewed.  She reports no features suggestive of oral thrush.  She does not have numbness tingling of the toes her upper extremities.  She has joint pains more at the bases of the thumbs worse with activity such as trying to open a jar which she hardly can do now.  She has noted no sustained stiffness in the morning or swelling in her joints.  She is able do all her day-to-day activities without significant impairment.  She has noted fatigue, dryness of nose, sores sore tongue, history of night sweats.  There is no family history of ankylosing spondylitis, Sjogren's.  Sister is noted to have rheumatoid arthritis.  She is not a smoker does not take alcohol.  She has had knee replacement 2007 2008.  She is known to have cervical disc disease and had a disc placement in 2016.  No personal family history of psoriasis ulcerative colitis Crohn's disease. Further historical information and ADL limitations as noted in the multidimensional health assessment questionnaire attached in the EMR.   ALLERGIES:Codeine and Egg white    PAST MEDICAL/ACTIVE PROBLEMS/MEDICATION/ FAMILY HISTORY/SOCIAL DATA:  The patient has a family history of  Past Medical History:   Diagnosis Date     GERD (gastroesophageal reflux disease)      Hypertension      Neck pain      Osteoarthritis 1/13/2016     Systemic lupus (H)      History   Smoking Status     Never Smoker   Smokeless Tobacco     Never Used     Patient Active Problem List   Diagnosis     Anxiety     Hypertension     Rotator Cuff Tendonitis     Esophageal Reflux     Allergic Rhinitis     Lower Back Pain Chronic     Vitamin D  "Deficiency     Menopause Has Occurred     Cervical radiculopathy at C5     Myelopathy, spondylogenic, cervical     Subacute cutaneous lupus erythematosus     Hypocomplementemia (H)     Sjogren's syndrome (H)     Primary osteoarthritis involving multiple joints     THEE positive     Current Outpatient Prescriptions   Medication Sig Dispense Refill     amLODIPine (NORVASC) 10 MG tablet TAKE ONE TABLET BY MOUTH ONCE DAILY AS DIRECTED 90 tablet 3     CARBOXYMETHYLCELL/HYPROMELLOSE (GENTEAL GEL OPHT) Apply 2 drops to eye as needed.       cevimeline (EVOXAC) 30 mg capsule Take 1 capsule (30 mg total) by mouth 2 (two) times a day. 180 capsule 1     citalopram (CELEXA) 20 MG tablet Take 1.5 tablets (30 mg total) by mouth daily. 135 tablet 2     clotrimazole (LOTRIMIN) 1 % cream APPLY  CREAM EXTERNALLY TWICE DAILY 45 g 1     cyanocobalamin, vitamin B-12, (VITAMIN B12 ORAL) Take by mouth.       diclofenac sodium (VOLTAREN) 1 % Gel 1 g tid 100 g 1     ibuprofen (ADVIL) 200 MG tablet Take 400 mg by mouth 2 (two) times a day.       loratadine (CLARITIN) 10 mg tablet Take 10 mg by mouth daily.       losartan (COZAAR) 100 MG tablet TAKE 1 TABLET BY MOUTH ONCE DAILY NEED  TO  ESTABLISH  NEW  PROVIDER 90 tablet 3     nystatin (MYCOSTATIN) cream Apply 1 application topically as needed.        nystatin (MYCOSTATIN) cream Apply topically 3 (three) times a day. 30 g 3     OMEGA-3/DHA/EPA/FISH OIL (FISH OIL-OMEGA-3 FATTY ACIDS) 300-1,000 mg capsule Take 2 g by mouth daily.       omeprazole (PRILOSEC) 20 MG capsule Take 20 mg by mouth 2 (two) times a day.       No current facility-administered medications for this visit.      DETAILED EXAMINATION  10/24/18  :  Vitals:    10/24/18 0914   BP: 116/62   Weight: 190 lb (86.2 kg)   Height: 5' 7\" (1.702 m)     Alert oriented. Head including the face is examined for malar rash, heliotropes, scarring, lupus pernio. Eyes examined for redness such as in episcleritis/scleritis, periorbital lesions.   " Neck/ Face examined for parotid gland swelling, range of motion of neck.  Left upper and lower and right upper and lower extremities examined for tenderness, swelling, warmth of the appendicular joints, range of motion, edema, rash.  Some of the important findings included: She has tenderness of the first CMC's and she has marked squaring bilaterally, trochanteric area tenderness bilaterally worse on the left side, she does not have synovitis of the palpable joints of the upper extremities.  Bilateral TKA.  She has dryness of oral mucosa without  saliva pool.    LAB / IMAGING DATA:  ALT   Date Value Ref Range Status   08/20/2018 52 (H) 0 - 45 U/L Final   08/16/2017 54 (H) 0 - 45 U/L Final   03/13/2017 53 (H) 0 - 45 U/L Final     Albumin   Date Value Ref Range Status   08/20/2018 3.6 3.5 - 5.0 g/dL Final   08/16/2017 3.5 3.5 - 5.0 g/dL Final   03/13/2017 3.7 3.5 - 5.0 g/dL Final     Creatinine   Date Value Ref Range Status   08/20/2018 0.80 0.60 - 1.10 mg/dL Final   08/16/2017 0.95 0.60 - 1.10 mg/dL Final   03/13/2017 1.05 0.60 - 1.10 mg/dL Final       WBC   Date Value Ref Range Status   08/20/2018 3.7 (L) 4.0 - 11.0 thou/uL Final   03/13/2017 6.2 4.0 - 11.0 thou/uL Final     Hemoglobin   Date Value Ref Range Status   08/20/2018 12.4 12.0 - 16.0 g/dL Final   08/16/2017 12.8 12.0 - 16.0 g/dL Final   03/13/2017 13.7 12.0 - 16.0 g/dL Final     Platelets   Date Value Ref Range Status   08/20/2018 223 140 - 440 thou/uL Final   03/13/2017 239 140 - 440 thou/uL Final   04/06/2016 245 140 - 440 thou/uL Final       Lab Results   Component Value Date    RF 53.1 (H) 04/06/2016    SEDRATE 33 (H) 08/20/2018

## 2021-06-21 NOTE — LETTER
Letter by Westley Townsend DO at      Author: Westley Townsend DO Service: -- Author Type: --    Filed:  Encounter Date: 11/4/2020 Status: (Other)         Carolina García  [address]  [facility] [address]       November 4, 2020     Dear Ms. García,    Below are the results from your recent visit:    Resulted Orders   Creatinine   Result Value Ref Range    Creatinine 0.82 0.60 - 1.10 mg/dL    GFR MDRD Af Amer >60 >60 mL/min/1.73m2    GFR MDRD Non Af Amer >60 >60 mL/min/1.73m2   ALT (SGPT)   Result Value Ref Range     (H) 0 - 45 U/L   AST (SGOT)   Result Value Ref Range     (H) 0 - 40 U/L   Albumin   Result Value Ref Range    Albumin 3.4 (L) 3.5 - 5.0 g/dL   Microalbumin, Random Urine   Result Value Ref Range    Microalbumin, Random Urine 1.18 0.00 - 1.99 mg/dL    Creatinine, Urine 56.4 mg/dL    Microalbumin/Creatinine Ratio Random Urine 20.9 (H) <=19.9 mg/g    Narrative    Microalbumin, Random Urine  <2.0 mg/dL . . . . . . . . Normal  3.0-30.0 mg/dL . . . . . . Microalbuminuria  >30.0 mg/dL . . . . . .  . Clinical Proteinuria    Microalbumin/Creatinine Ratio, Random Urine  <20 mg/g . . . . .. . . . Normal   mg/g . . . . . . . Microalbuminuria  >300 mg/g . . . . . . . . Clinical Proteinuria       Complement, C'3   Result Value Ref Range    C3 Complement 57 (L) 83 - 177 mg/dL   Complement, C'4   Result Value Ref Range    C4 Complement 16 (L) 19 - 59 mg/dL   Uric Acid   Result Value Ref Range    Uric Acid 3.3 2.0 - 7.5 mg/dL   Erythrocyte Sedimentation Rate   Result Value Ref Range    Sed Rate 30 (H) 0 - 20 mm/hr   C-Reactive Protein   Result Value Ref Range    CRP <0.1 0.0 - 0.8 mg/dL   HM1 (CBC with Diff)   Result Value Ref Range    WBC 5.4 4.0 - 11.0 thou/uL    RBC 4.08 3.80 - 5.40 mill/uL    Hemoglobin 12.6 12.0 - 16.0 g/dL    Hematocrit 37.6 35.0 - 47.0 %    MCV 92 80 - 100 fL    MCH 30.9 27.0 - 34.0 pg    MCHC 33.5 32.0 - 36.0 g/dL    RDW 13.5 11.0 - 14.5 %     Platelets 197 140 - 440 thou/uL    MPV 8.4 (L) 8.5 - 12.5 fL    Neutrophils % 63 50 - 70 %    Lymphocytes % 25 20 - 40 %    Monocytes % 10 2 - 10 %    Eosinophils % 1 0 - 6 %    Basophils % 1 0 - 2 %    Immature Granulocyte % 0 <=0 %    Neutrophils Absolute 3.4 2.0 - 7.7 thou/uL    Lymphocytes Absolute 1.4 0.8 - 4.4 thou/uL    Monocytes Absolute 0.5 0.0 - 0.9 thou/uL    Eosinophils Absolute 0.1 0.0 - 0.4 thou/uL    Basophils Absolute 0.0 0.0 - 0.2 thou/uL    Immature Granulocyte Absolute 0.0 <=0.0 thou/uL     Although complement levels are a bit low, levels are stable when trending over time.     Borderline elevated urine microalbumin level, currentlevel not worrisome.     Sedimentation rate and CRP levels are nonspecific inflammatory markers which need to be correlated clinically.  Current sedimentation rate is fine when corrected for patient's age/gender (stable compared to prior level).  CRP level is within normal limits.     Liver enzymes still noted to be elevated, not new with levels fluctuating, recommend periodic monitoring and discussing further with your primary care physician.  Recommend rechecking hepatic panel a few days prior to next follow-up visit with us (on 12/23/20).     Otherwise remainder of lab results noted to be stable.       Sincerely,      Electronically signed by Westley Townsend DO

## 2021-06-23 NOTE — PROGRESS NOTES
ASSESSMENT AND PLAN:  Carolina García 74 y.o. female is seen here on 02/06/19 for follow-up of Sjogren's syndrome which is manifested by sicca symptoms, predominantly oral, ocular, background of positive THEE SSA antibodies.  She has osteoarthritis.  She is now reporting symptoms in her hands, which are suggestive of inflammatory joint disease.  We talked about hydroxychloroquine.  I have given her [employer] handout to review on this need for eye examination outlined the risk of skin rash GI symptoms outlined.   She is finding the secretagogues helpful refills provided.  Follow-up here in 3 months.         Diagnoses and all orders for this visit:    Sjogren's syndrome with keratoconjunctivitis sicca (H)  -     cevimeline (EVOXAC) 30 mg capsule  Dispense: 180 capsule; Refill: 1  -     hydroxychloroquine (PLAQUENIL) 200 mg tablet  Dispense: 60 tablet; Refill: 6    Primary osteoarthritis involving multiple joints    THEE positive    Hypocomplementemia (H)      HISTORY OF PRESENTING ILLNESS:  Carolina García, 74 y.o., female is here for follow-up of Sjogren's syndrome.  She is complaining of pain in her hands especially the right side.  This is moderately severe.  This is worse in the morning.  She is having difficulty making a fist in the morning or flexing the fingers.  She has noted swelling in her digits.  She rates the pain moderately severe.  This is different from the first CMC area pain for which she had corticosteroid injection with modest response.  No other joint area similarly affected.  Morning stiffness lasts about an hour.  She found the secretagogues were helpful.  Basis of the thumbs.  Interferes with day-to-day activities.  She is not woken up from sleep because of this.  What wakes her up from sleep is the trochanteric area pain which is bilateral.  On a previous visit she had labs drawn which show positive THEE low complements.  Her SSA antibodies were positive.  Double-stranded  "DNA antibody is negative.  She has mild leukopenia.  On a previous visit she had noted that symptoms going on for the past 3 or 4 years.  These comprise of sicca symptoms affecting her eyes, mouth.  She has had suboptimal dental health and has problems \"and almost all her teeth\".  She describes her dryness of mouth which is progressive.  This interferes with her swallowing.  This wakes her up from sleep.  She did not tolerate pilocarpine.  Cevimeline was helpful.  She was not so sure until she stopped taking it and had more dryness.  She has not had a parotid area swelling although an MRI done for evaluation of C-spine did incidentally note parotid swelling this is noted in the chart which is reviewed.  She reports no features suggestive of oral thrush.  She does not have numbness tingling of the toes her upper extremities.  She has joint pains more at the bases of the thumbs worse with activity such as trying to open a jar which she hardly can do now.  She has noted no sustained stiffness in the morning or swelling in her joints.  She is able do all her day-to-day activities without significant impairment.  She has noted fatigue, dryness of nose, sores sore tongue, history of night sweats.  There is no family history of ankylosing spondylitis, Sjogren's.  Sister is noted to have rheumatoid arthritis.  She is not a smoker does not take alcohol.  She has had knee replacement 2007 2008.  She is known to have cervical disc disease and had a disc placement in 2016.  No personal family history of psoriasis ulcerative colitis Crohn's disease. Further historical information and ADL limitations as noted in the multidimensional health assessment questionnaire attached in the EMR.   ALLERGIES:Codeine and Egg white    PAST MEDICAL/ACTIVE PROBLEMS/MEDICATION/ FAMILY HISTORY/SOCIAL DATA:  The patient has a family history of  Past Medical History:   Diagnosis Date     GERD (gastroesophageal reflux disease)      Hypertension      " Neck pain      Osteoarthritis 1/13/2016     Systemic lupus (H)      Social History     Tobacco Use   Smoking Status Never Smoker   Smokeless Tobacco Never Used     Patient Active Problem List   Diagnosis     Anxiety     Hypertension     Rotator Cuff Tendonitis     Esophageal Reflux     Allergic Rhinitis     Lower Back Pain Chronic     Vitamin D Deficiency     Menopause Has Occurred     Cervical radiculopathy at C5     Myelopathy, spondylogenic, cervical     Subacute cutaneous lupus erythematosus     Hypocomplementemia (H)     Sjogren's syndrome (H)     Primary osteoarthritis involving multiple joints     THEE positive     Localized primary carpometacarpal osteoarthritis     Current Outpatient Medications   Medication Sig Dispense Refill     amLODIPine (NORVASC) 10 MG tablet TAKE ONE TABLET BY MOUTH ONCE DAILY AS DIRECTED 90 tablet 3     CARBOXYMETHYLCELL/HYPROMELLOSE (GENTEAL GEL OPHT) Apply 2 drops to eye as needed.       citalopram (CELEXA) 20 MG tablet Take 1.5 tablets (30 mg total) by mouth daily. 135 tablet 2     clotrimazole (LOTRIMIN) 1 % cream APPLY  CREAM EXTERNALLY TWICE DAILY 45 g 1     cyanocobalamin, vitamin B-12, (VITAMIN B12 ORAL) Take by mouth.       diclofenac sodium (VOLTAREN) 1 % Gel 1 g tid 100 g 1     ibuprofen (ADVIL) 200 MG tablet Take 400 mg by mouth 2 (two) times a day.       loratadine (CLARITIN) 10 mg tablet Take 10 mg by mouth daily.       losartan (COZAAR) 100 MG tablet TAKE 1 TABLET BY MOUTH ONCE DAILY NEED  TO  ESTABLISH  NEW  PROVIDER 90 tablet 3     nystatin (MYCOSTATIN) cream Apply 1 application topically as needed.        nystatin (MYCOSTATIN) cream Apply topically 3 (three) times a day. 30 g 3     OMEGA-3/DHA/EPA/FISH OIL (FISH OIL-OMEGA-3 FATTY ACIDS) 300-1,000 mg capsule Take 2 g by mouth daily.       omeprazole (PRILOSEC) 20 MG capsule Take 20 mg by mouth 2 (two) times a day.       cevimeline (EVOXAC) 30 mg capsule Take 1 capsule (30 mg total) by mouth 2 (two) times a day. 180  capsule 1     No current facility-administered medications for this visit.      DETAILED EXAMINATION  02/06/19  :  Vitals:    02/06/19 0929   BP: 96/60   Patient Site: Right Arm   Patient Position: Sitting   Cuff Size: Adult Large   Pulse: 84   Weight: 186 lb (84.4 kg)     Alert oriented. Head including the face is examined for malar rash, heliotropes, scarring, lupus pernio. Eyes examined for redness such as in episcleritis/scleritis, periorbital lesions.   Neck/ Face examined for parotid gland swelling, range of motion of neck.  Left upper and lower and right upper and lower extremities examined for tenderness, swelling, warmth of the appendicular joints, range of motion, edema, rash.  Some of the important findings included: Her PIPs are tender she has tenderness in the flexor tendons of the index and middle finger, right hand second and third.  She does have some saliva now.  She has bilateral TKAs.     LAB / IMAGING DATA:  ALT   Date Value Ref Range Status   08/20/2018 52 (H) 0 - 45 U/L Final   08/16/2017 54 (H) 0 - 45 U/L Final   03/13/2017 53 (H) 0 - 45 U/L Final     Albumin   Date Value Ref Range Status   08/20/2018 3.6 3.5 - 5.0 g/dL Final   08/16/2017 3.5 3.5 - 5.0 g/dL Final   03/13/2017 3.7 3.5 - 5.0 g/dL Final     Creatinine   Date Value Ref Range Status   08/20/2018 0.80 0.60 - 1.10 mg/dL Final   08/16/2017 0.95 0.60 - 1.10 mg/dL Final   03/13/2017 1.05 0.60 - 1.10 mg/dL Final       WBC   Date Value Ref Range Status   08/20/2018 3.7 (L) 4.0 - 11.0 thou/uL Final   03/13/2017 6.2 4.0 - 11.0 thou/uL Final     Hemoglobin   Date Value Ref Range Status   08/20/2018 12.4 12.0 - 16.0 g/dL Final   08/16/2017 12.8 12.0 - 16.0 g/dL Final   03/13/2017 13.7 12.0 - 16.0 g/dL Final     Platelets   Date Value Ref Range Status   08/20/2018 223 140 - 440 thou/uL Final   03/13/2017 239 140 - 440 thou/uL Final   04/06/2016 245 140 - 440 thou/uL Final       Lab Results   Component Value Date    RF 53.1 (H) 04/06/2016     DANISHA 33 (H) 08/20/2018

## 2021-06-30 NOTE — PROGRESS NOTES
Progress Notes by Angela Jacobson OT at 11/16/2020 11:00 AM     Author: Angela Jacobson OT Service: -- Author Type: Occupational Therapist    Filed: 11/16/2020 12:23 PM Encounter Date: 11/16/2020 Status: Attested    : Angela Jacobson OT (Occupational Therapist) Cosigner: Westley Townsend DO at 11/18/2020  6:21 PM    Attestation signed by Westley Townsend DO at 11/18/2020  6:21 PM    Agree with plan.                    Certification Request    November 16, 2020      Patient: Carolina García  MR Number: [id number]  YOB: 1945  Date of Visit: 11/16/2020      Dear Dr. Westley Townsend:    Thank you for this referral.   We are seeing Carolina García in Occupational Therapy for right hand pain.    Medicare and/or Medicaid requires physician review and approval of the treatment plan. Please review the plan of care and verify that you agree with the therapy plan of care by co-signing this note.      Plan of Care  Authorization / Certification Start Date: 11/16/20  Authorization / Certification End Date: 02/14/21  Authorization / Certification Number of Visits: 12  Communication with: Referral Source  Patient Related Instruction: Treatment plan and rationale;Basis of treatment;Expected outcome;Precautions  Times per Week: 1  Number of Weeks: 12  Number of Visits: 12  Select Plan of Care: Select  Therapeutic Exercise: Stretching;ROM;Strengthening  Functional Training (ADL's): compensatory training;ergonomics;ADL's  Orthotic Fitting: custom;OTC      Goals:  Patient Will Demonstrate / Verbalize independence in self-management of condition in: 2 weeks  Patient will be independent with home exercise program in: 2 weeks  Patient will be able to: lift;carry;with less pain;for housework;in 12 weeks  Patient will perform: meal preparation;with less pain;with less difficulty;in 12 weeks  Patient will be able to  & pinch: for dressing;for hygiene;with less pain;with  less difficulty;in 12 weeks  Patient will improve hand/finger coordination for: writing;fasteners;with less pain;with less difficulty;in 12 weeks        If you have any questions or concerns, please don't hesitate to call.    Sincerely,      Angela Jacobson, OT        Physician recommendation:                                ___ Follow therapist's recommendation                                                                                                    ___ Modify therapy                                                                                                      Physician Signature:_____________________                                                                                                                                        Date:___________________________      *Physician co-signature indicates they certify the need for these services furnished within this plan and while under their care.        Upper Extremity Initial Evaluation    Patient Name: Carolina García  Date of evaluation: 11/16/2020  Referral Diagnosis: chronic pain of right hand  Referring provider: Westley Townsend*  Visit Diagnosis:     ICD-10-CM    1. Pain of finger of right hand  M79.644    2. Right hand weakness  R29.898    3. Decreased activities of daily living (ADL)  Z78.9    4. Finger stiffness, right  M25.641        Assessment:      Pt. is appropriate for skilled OT intervention as outlined in the Plan of Care (POC). Patient instructed on initial HEP. See back in 2 weeks.    Goals:  Patient Will Demonstrate / Verbalize independence in self-management of condition in: 2 weeks  Patient will be independent with home exercise program in: 2 weeks  Patient will be able to: lift;carry;with less pain;for housework;in 12 weeks  Patient will perform: meal preparation;with less pain;with less difficulty;in 12 weeks  Patient will be able to  & pinch: for dressing;for hygiene;with less pain;with less difficulty;in 12  weeks  Patient will improve hand/finger coordination for: writing;fasteners;with less pain;with less difficulty;in 12 weeks    Patient's expectations/goals are realistic.    Barriers to Learning or Achieving Goals:  No Barriers.       Plan / Patient Instructions:        Plan of Care:   Authorization / Certification Start Date: 11/16/20  Authorization / Certification End Date: 02/14/21  Authorization / Certification Number of Visits: 12  Communication with: Referral Source  Patient Related Instruction: Treatment plan and rationale;Basis of treatment;Expected outcome;Precautions  Times per Week: 1  Number of Weeks: 12  Number of Visits: 12  Select Plan of Care: Select  Therapeutic Exercise: Stretching;ROM;Strengthening  Functional Training (ADL's): compensatory training;ergonomics;ADL's  Orthotic Fitting: custom;OTC         Subjective:        Social information:   Occupation:retired   Work Status:NA    History of Present Illness:    Carolina is a 75 y.o. female who presents to therapy today with complaints of right 2nd PIP pain and hand weakness. Date of onset/duration of symptoms is about 2 years ago with worsening pain since November 2019. She has been getting Cortisone injections in the finger joint for about a year and a half. Onset was gradual. Symptoms are getting worse. She reports no history of similar symptoms prior to 2 years ago. She describes their previous level of function as limited similar to now. Functional limitations are described as occurring with gripping, pinching, lifting, carrying for ADL's and IADL's.     Pain rating at rest: 0  Pain rating with activity: 9         Objective:      Note: Items left blank indicates the item was not performed or not indicated at the time of the evaluation.    Patient Outcome Measures :    QuickDASH Score: 50      Upper Extremity Examination:  1. Pain of finger of right hand     2. Right hand weakness     3. Decreased activities of daily living (ADL)     4. Finger  stiffness, right       Precautions/Restrictions: None  Involved side: Right  Atrophy:  Present  Color: Normal  Temperature: Normal  Edema: Minimal right 2nd PIP joint  Palpation: pain in right 2nd PIP as well as milder pain in other fingers  Scar: NA  Guarded extremity: Minimal.  Sensory: Patient reports normal.      Coordination  Right   Left     NT WNL Impaired NT WNL Impaired   Gross Motor  x   x    Fine Motor   x  x    9 hole peg x   x       Hand AROM  Right   Left     NT WNL Impaired NT WNL Impaired   Full Fist  x   x    Flat Fist  x   x    Claw Fist   x   x     ROM/STRENGTH RIGHT LEFT   Note: * indicates pain AROM AROM   Shoulder Flexion - 180? WNL WNL   Shoulder Ext - 60?      Shoulder Abd - 180?     Elbow Flexion - 150? WNL WNL   Elbow Extension - 0?    WNL WNL   Forearm Supination - 80? WNL WNL   Forearm Pronation - 80? WNL WNL   Wrist Flexion - 65-80?  WNL WNL   Wrist Extension - 65-80? WNL WNL   Ulnar Deviation - 20-35?     Radial Deviation - 10-20?      Strength 39# 42#   3 Point Pinch 7# 10#   Lateral Pinch 7# 11#     Right 2nd PIP is turning in medially.    May benefit from PT evaluation: No    U/E orthosis currently: Patient has OTC wrist braces but doesn't wear any more    Plan for next visit: PROM to right 2nd PIP and ergonomics/activity modification, paraffin    Treatment Today: Patient instructed on and tried paraffin to right hand today. She will see if this is beneficial and purchase a home unit if desired. Patient instructed on use of X-span finger sleeves for night wearing to decrease joint swelling. Patient fitted with Oval 8 splint size 12 for right 2nd PIP to prevent further turning in of PIP joint.  TREATMENT MINUTES COMMENTS   Evaluation 20    Self-care/ Home management     Manual therapy 5 Finger sleeve   Neuromuscular Re-education     Orthotic fitting 8 Oval 8   Therapeutic Exercises     Paraffin 15          Total 48    Blank areas are intentional and mean the treatment did not  include these items.     GOALS AND PLAN OF CARE WERE ESTABLISHED IN COOPERATION WITH THE PATIENT    OT Evaluation Code: (Please list factors)   Comorbidities:   Patient Active Problem List   Diagnosis   ? Anxiety   ? Hypertension   ? Rotator Cuff Tendonitis   ? Esophageal Reflux   ? Allergic Rhinitis   ? Lower Back Pain Chronic   ? Vitamin D Deficiency   ? Menopause Has Occurred   ? Cervical radiculopathy at C5   ? Myelopathy, spondylogenic, cervical   ? Subacute cutaneous lupus erythematosus   ? Hypocomplementemia (H)   ? Sjogren's syndrome (H)   ? Primary osteoarthritis involving multiple joints   ? THEE positive   ? Localized primary carpometacarpal osteoarthritis   ? Abnormal nuclear stress test   ? Precordial chest pain        Profile/History Review: Brief    Need for eval modification: No    # Treatment options: Limited    Clinical Decision Making:  Low      Occupational Profile/ Medical and Therapy History and Comorbidities Occupational Performance Clinical Decision Making   (Complexity)   brief history with review of medical/therapy records related to the presenting problem.  No comorbidities 1-3 Performance deficits that result in activity limitations and/or participation restrictions.    No Assessment Modification  Low complexity, which includes  problem-focused assessments, and consideration of a limited number of treatment options.      expanded review of medical/therapy records and additional review of physical, cognitive and psychosocial history.    May have comorbidities 3-5 Performance deficits that result in activity limitations and/or participation restrictions.    Minimal to moderate modification of assessment Moderate complexity, which includes analysis of data from detailed assessments, and consideration of several treatment options.         Review of medical/therapy records and extensive additional review of physical, cognitive and psychosocial history.  Comorbidities affect occupational  performance 5 or more Performance deficits that result in activity limitations and/or participation restrictions.    Significant modification of assessment High complexity, analysis of  Occupational profile and data,  Comprehensive assessments, multiple treatment options.            Angela Jacobson  11/16/2020  11:01 AM

## 2021-07-13 ENCOUNTER — RECORDS - HEALTHEAST (OUTPATIENT)
Dept: ADMINISTRATIVE | Facility: CLINIC | Age: 76
End: 2021-07-13

## 2021-07-15 DIAGNOSIS — I10 ESSENTIAL HYPERTENSION: ICD-10-CM

## 2021-07-15 DIAGNOSIS — M35.01 SJOGREN'S SYNDROME WITH KERATOCONJUNCTIVITIS SICCA (H): ICD-10-CM

## 2021-07-15 RX ORDER — PILOCARPINE HYDROCHLORIDE 5 MG/1
TABLET, FILM COATED ORAL
Qty: 180 TABLET | Refills: 0 | Status: SHIPPED | OUTPATIENT
Start: 2021-07-15 | End: 2022-01-27

## 2021-07-20 RX ORDER — LOSARTAN POTASSIUM 100 MG/1
TABLET ORAL
Qty: 90 TABLET | Refills: 3 | Status: SHIPPED | OUTPATIENT
Start: 2021-07-20 | End: 2022-08-30

## 2021-07-20 NOTE — TELEPHONE ENCOUNTER
"Routing refill request to provider for review/approval because:  Early refill request    Last Written Prescription Date:  9/21/20  Last Fill Quantity: 90,  # refills: 3   Last office visit provider:  4/30/21     Requested Prescriptions   Pending Prescriptions Disp Refills     losartan (COZAAR) 100 MG tablet [Pharmacy Med Name: LOSARTAN POTASSIUM 100 MG TAB] 90 tablet 3     Sig: TAKE 1 TABLET BY MOUTH ONCE A DAY       Angiotensin-II Receptors Passed - 7/15/2021  3:43 PM        Passed - Last blood pressure under 140/90 in past 12 months     BP Readings from Last 3 Encounters:   04/30/21 112/64   08/10/20 118/64   02/06/20 112/70                 Passed - Recent (12 mo) or future (30 days) visit within the authorizing provider's specialty     Patient has had an office visit with the authorizing provider or a provider within the authorizing providers department within the previous 12 mos or has a future within next 30 days. See \"Patient Info\" tab in inbasket, or \"Choose Columns\" in Meds & Orders section of the refill encounter.              Passed - Medication is active on med list        Passed - Patient is age 18 or older        Passed - No active pregnancy on record        Passed - Normal serum creatinine on file in past 12 months     Recent Labs   Lab Test 04/30/21  1533   CR 0.84       Ok to refill medication if creatinine is low          Passed - Normal serum potassium on file in past 12 months     Recent Labs   Lab Test 04/30/21  1533   POTASSIUM 4.2                    Passed - No positive pregnancy test in past 12 months             Efe Godwin RN 07/20/21 10:58 AM  "

## 2021-07-21 ENCOUNTER — RECORDS - HEALTHEAST (OUTPATIENT)
Dept: ADMINISTRATIVE | Facility: CLINIC | Age: 76
End: 2021-07-21

## 2021-08-23 ENCOUNTER — OFFICE VISIT (OUTPATIENT)
Dept: RHEUMATOLOGY | Facility: CLINIC | Age: 76
End: 2021-08-23
Payer: MEDICARE

## 2021-08-23 VITALS
DIASTOLIC BLOOD PRESSURE: 68 MMHG | WEIGHT: 173.7 LBS | HEART RATE: 84 BPM | BODY MASS INDEX: 27.21 KG/M2 | SYSTOLIC BLOOD PRESSURE: 108 MMHG

## 2021-08-23 DIAGNOSIS — M35.01 SJOGREN'S SYNDROME WITH KERATOCONJUNCTIVITIS SICCA (H): Primary | ICD-10-CM

## 2021-08-23 DIAGNOSIS — R74.01 TRANSAMINITIS: ICD-10-CM

## 2021-08-23 DIAGNOSIS — M15.0 PRIMARY OSTEOARTHRITIS INVOLVING MULTIPLE JOINTS: ICD-10-CM

## 2021-08-23 LAB
ALBUMIN UR-MCNC: NEGATIVE MG/DL
APPEARANCE UR: CLEAR
BILIRUB UR QL STRIP: NEGATIVE
COLOR UR AUTO: YELLOW
CREAT UR-MCNC: 35 MG/DL
GLUCOSE UR STRIP-MCNC: NEGATIVE MG/DL
HGB UR QL STRIP: NEGATIVE
KETONES UR STRIP-MCNC: NEGATIVE MG/DL
LEUKOCYTE ESTERASE UR QL STRIP: NEGATIVE
MICROALBUMIN UR-MCNC: <0.5 MG/DL (ref 0–1.99)
MICROALBUMIN/CREAT UR: NORMAL MG/G{CREAT}
NITRATE UR QL: NEGATIVE
PH UR STRIP: 7 [PH] (ref 5–8)
RBC #/AREA URNS AUTO: NORMAL /HPF
SP GR UR STRIP: 1.01 (ref 1–1.03)
UROBILINOGEN UR STRIP-ACNC: 0.2 E.U./DL
WBC #/AREA URNS AUTO: NORMAL /HPF

## 2021-08-23 PROCEDURE — 81001 URINALYSIS AUTO W/SCOPE: CPT | Performed by: INTERNAL MEDICINE

## 2021-08-23 PROCEDURE — 82043 UR ALBUMIN QUANTITATIVE: CPT | Performed by: INTERNAL MEDICINE

## 2021-08-23 PROCEDURE — 99214 OFFICE O/P EST MOD 30 MIN: CPT | Performed by: INTERNAL MEDICINE

## 2021-08-23 NOTE — PROGRESS NOTES
Carolina García who presents today with a chief complaint of  RECHECK (Sjogren's syndrome with keratoconjunctivitis sicca )      Joint Pains: Yes  Location: hand  Onset: chronic  Intensity: 5 /10  AM Stiffness: 2-5 Minutes  Alleviating/Aggravating Factors: Medications helpful? Voltaren cream  Tolerating Meds: Yes  Other: wears brace if really bad      ROS:  Patient denies having any active: chest pain, shortness of breath, cough, abdominal pain, nausea, vomiting, rashes, documented fevers, oral ulcers and recent infections.  Patient admits to having a good appetite  Information gathered by medical assistant incorporated into this note, was reviewed and discussed with the patient.    Problem List:  Patient Active Problem List   Diagnosis     Anxiety     Hypertension     Rotator Cuff Tendonitis     Esophageal Reflux     Allergic Rhinitis     Lower Back Pain Chronic     Vitamin D Deficiency     Menopause Has Occurred     Cervical radiculopathy at C5     Myelopathy, spondylogenic, cervical     Subacute cutaneous lupus erythematosus     Hypocomplementemia (H)     Sjogren's syndrome (H)     Primary osteoarthritis involving multiple joints     THEE positive     Localized primary carpometacarpal osteoarthritis     Abnormal nuclear stress test     Precordial chest pain     Nonspecific abdominal pain     Nausea and vomiting     Diverticular disease of colon     Abnormal liver function tests        PMH:   Past Medical History:   Diagnosis Date     GERD (gastroesophageal reflux disease)      Hypertension      Neck pain      Osteoarthritis 1/13/2016     Systemic lupus (H)        Surgical History:  Past Surgical History:   Procedure Laterality Date     ARTHROPLASTY KNEE BILATERAL       BIOPSY BREAST Right 2005    benign     C GASTROPLASTY,OBESITY,OTHER      Description: Gastric Surgery For Morbid Obesity Gastric Stapling;  Recorded: 04/16/2012;     CV CORONARY ANGIOGRAM N/A 8/22/2019    Procedure: Coronary Angiogram;  Surgeon:  Melissa Vasquez MD;  Location: [facility] Cath Lab;  Service: Cardiology     CV LEFT HEART CATHETERIZATION WITHOUT LEFT VENTRICULOGRAM Left 8/22/2019    Procedure: Left Heart Catheterization Without Left Ventriculogram;  Surgeon: Melissa Vasquez MD;  Location: [facility] Cath Lab;  Service: Cardiology     FISSURECTOMY RECTUM       HC REMOVAL GALLBLADDER      Description: Cholecystectomy;  Recorded: 04/16/2012;     HYSTERECTOMY  1986     VT TOT DISC ARTHRP ART DISC ANT APPRO 1 NTRSPC CRV N/A 6/17/2015    Procedure:  C45 MOBI-C DISC ARTHORPLASTY, ATTEMPT MOBI C C56, ANTERIOR CERVICAL DISCECTOMY AND FUSION IF UNABLE TO PLACE ;  Surgeon: Korina Beltrán MD;  Location: [facility] OR;  Service: Spine       Family History:  Family History   Problem Relation Age of Onset     Breast Cancer Sister 52.00     Cancer Sister      Breast Cancer Sister 60.00       Social History:   reports that she has never smoked. She has never used smokeless tobacco. She reports current alcohol use. She reports that she does not use drugs.    Allergies:  Allergies   Allergen Reactions     Hydroxychloroquine Shortness Of Breath     Codeine Unknown     Emesis, tired     Egg White [Albumin, Egg] Unknown     Dumping syndrome        Current Medications:  Current Outpatient Medications   Medication Sig Dispense Refill     acetaminophen (TYLENOL) 500 MG tablet [ACETAMINOPHEN (TYLENOL) 500 MG TABLET] Take 500 mg by mouth 2 (two) times a day.       amLODIPine (NORVASC) 10 MG tablet [AMLODIPINE (NORVASC) 10 MG TABLET] Take 0.5 tablets (5 mg total) by mouth daily. As directed 90 tablet 3     amLODIPine (NORVASC) 5 MG tablet [AMLODIPINE (NORVASC) 5 MG TABLET] Take 1 tablet (5 mg total) by mouth daily. 90 tablet 3     CARBOXYMETHYLCELL/HYPROMELLOSE (GENTEAL GEL OPHT) [CARBOXYMETHYLCELL/HYPROMELLOSE (GENTEAL GEL OPHT)] Apply 2 drops to eye as needed.       cevimeline (EVOXAC) 30 mg capsule [CEVIMELINE (EVOXAC) 30 MG CAPSULE] Take 1 capsule by mouth  daily.        citalopram (CELEXA) 20 MG tablet [CITALOPRAM (CELEXA) 20 MG TABLET] TAKE 1 & 1/2 TABLETS BY MOUTH ONCE A  tablet 3     diclofenac sodium (VOLTAREN) 1 % Gel [DICLOFENAC SODIUM (VOLTAREN) 1 % GEL] Apply 2 g topically daily as needed.       loratadine (CLARITIN) 10 mg tablet [LORATADINE (CLARITIN) 10 MG TABLET] Take 10 mg by mouth daily.       losartan (COZAAR) 100 MG tablet TAKE 1 TABLET BY MOUTH ONCE A DAY 90 tablet 3     multivitamin therapeutic tablet [MULTIVITAMIN THERAPEUTIC TABLET] Take 1 tablet by mouth daily.       OMEGA-3/DHA/EPA/FISH OIL (FISH OIL-OMEGA-3 FATTY ACIDS) 300-1,000 mg capsule [OMEGA-3/DHA/EPA/FISH OIL (FISH OIL-OMEGA-3 FATTY ACIDS) 300-1,000 MG CAPSULE] Take 2 g by mouth daily.       omeprazole (PRILOSEC) 20 MG capsule [OMEPRAZOLE (PRILOSEC) 20 MG CAPSULE] Take 2 capsules (40 mg total) by mouth 2 (two) times a day before meals. 120 capsule 1     pilocarpine (SALAGEN) 5 MG tablet Take 1 tablet p.o. two times a day prn 180 tablet 0     polyvinyl alcohol (LIQUIFILM TEARS) 1.4 % ophthalmic solution [POLYVINYL ALCOHOL (LIQUIFILM TEARS) 1.4 % OPHTHALMIC SOLUTION] Administer 1 drop to both eyes as needed for dry eyes.             Physical Exam:  There were no vitals taken for this visit.  General: A & O x 3 in NAD  HEENT: EOMI, Non injected/non icteric sclera, no oral lesions noted  CV: s1s2 with RRR, no rubs appreciated   Lungs: CTA B/L, no wheezing , rales or rhonci appreciated  GI: Soft, NT/ND, no rebound, no guarding noted  MS: Otherwise patient demonstrated good passive/active ROM over other joints with no warmth, erythema, tenderness or synovitis noted over these joints.      Summary/Assessment:    History that includes Sjogren's, osteoarthritis, subcutaneous lupus.    Presents for follow-up visit.    Claims that her Sjogren's and joints have been stable.    Has had liver biopsy consistent with cirrhosis of unclear etiology.  Denies alcohol beverage intake.  Scheduled to have  periodic ultrasounds for monitoring purposes.    Decreased pilocarpine from 3 times a day to once or twice a day, due to some dizziness.  Blood pressure medication also modified.  Dizziness has since improved.    Please see below for management plan.    From prior note: Was a patient of Dr. Resendiz, last seen February 2020.    History that includes Sjogren's, osteoarthritis, subcutaneous lupus.    Presents today complaining of having right fifth PIP pain.  X-rays performed by orthopedics showed signs of moderate DJD involving this joint.  Patient to having some DJD involving other joints of right hip.  Received cortisone injection by orthopedics about 5 months ago with good benefit however pain resurfacing.    Utilizes Voltaren gel periodically with some relief.    Has chronic transaminitis perhaps related to NSAID use.  There was increase in baseline levels couple of months ago, patient discontinued Aleve resulting in some improvement.    States height is 5 foot 7 weighs 179 pounds, perhaps also has component of fatty liver playing a role.    Admits to having sicca symptoms.  Was taking Evoxac with some benefit however too costly, desires to try another medication, does not recall trying pilocarpine.  Did not tolerate Plaquenil well.    Currently not taking any calcium or vitamin D, takes a supplement.      Not much benefit with Tylenol    Pertinent rheumatology/past medical history (please refer to above for more detailed history):      Sjogren's    Subcutaneous lupus (biopsy positive per patient)    Elevated rheumatoid factor    Osteoarthritis    Chronic right fifth digit/PIP pain    Transaminitis, Liver Cirrhosis       Rheumatology medications provided/suggested:    Pilocarpine  Diclofenac gel      Pertinent medication from other providers or from otc (please refer to above for more detailed med list):    Celexa  Multivitamin    Pertinent medications already tried:     Evoxac (costly and not as beneficial as  pilocarpine)  Hydroxychloroquine (intolerance)      Pertinent lab history:    Elevated: THEE, SSA/SSB antibodies, rheumatoid factor    Negative/unremarkable:  CCP antibody, other THEE related subsets, hep B&C      Pertinent imaging/test history:    2020: X-rays performed at orthopedics showed signs of osteoarthritis involving right fifth PIP joint and other joints involving this second.      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    Unable to order vitamin D level based on patient's insurance.        Plan:      Continue pilocarpine 5 mg once or twice a day, had dizziness with 3 times daily dosing (BP medication perhaps also contributed).      Continue conservative measures for Sjogren's.    Continue following up with [facility]/hepatologist for liver cirrhosis of unclear etiology.    Continue diclofenac gel on a very limited basis, no more than 1-2 times per per week, limiting due to liver disease.    Continue exercises provided by OT, as tolerated.    Wears brace at night for right index pain, finds helpful.    Recheck urine labs today and serum labs in approximately 2 to 3 months.    Follow-up in 6 months.          This note was transcribed using Dragon voice recognition software as a result unintentional grammatical errors or word substitutions may have occurred. Please contact our Rheumatology department if you need any clarification or if you have any related inquiries.    Major side effect profile of medications provided were discussed with the patient.      Westley Townsend DO

## 2021-10-10 ENCOUNTER — HEALTH MAINTENANCE LETTER (OUTPATIENT)
Age: 76
End: 2021-10-10

## 2021-10-25 DIAGNOSIS — F41.1 ANXIETY STATE: ICD-10-CM

## 2021-10-27 RX ORDER — CITALOPRAM HYDROBROMIDE 20 MG/1
TABLET ORAL
Qty: 135 TABLET | Refills: 2 | Status: SHIPPED | OUTPATIENT
Start: 2021-10-27 | End: 2022-09-24

## 2021-10-27 NOTE — TELEPHONE ENCOUNTER
"Last Written Prescription Date:  8/25/20  Last Fill Quantity: 135,  # refills: 3   Last office visit provider:  4/30/21     Requested Prescriptions   Pending Prescriptions Disp Refills     citalopram (CELEXA) 20 MG tablet [Pharmacy Med Name: CITALOPRAM HBR 20 MG TABLET] 135 tablet 3     Sig: TAKE 1 & 1/2 TABLETS BY MOUTH ONCE A DAY       SSRIs Protocol Passed - 10/25/2021  5:25 PM        Passed - Recent (12 mo) or future (30 days) visit within the authorizing provider's specialty     Patient has had an office visit with the authorizing provider or a provider within the authorizing providers department within the previous 12 mos or has a future within next 30 days. See \"Patient Info\" tab in inbasket, or \"Choose Columns\" in Meds & Orders section of the refill encounter.              Passed - Medication is active on med list        Passed - Patient is age 18 or older        Passed - No active pregnancy on record        Passed - No positive pregnancy test in last 12 months             Efe Godwin RN 10/27/21 9:35 AM  "

## 2021-11-15 ENCOUNTER — OFFICE VISIT (OUTPATIENT)
Dept: FAMILY MEDICINE | Facility: CLINIC | Age: 76
End: 2021-11-15
Payer: MEDICARE

## 2021-11-15 VITALS
BODY MASS INDEX: 26.71 KG/M2 | DIASTOLIC BLOOD PRESSURE: 64 MMHG | OXYGEN SATURATION: 98 % | HEIGHT: 67 IN | WEIGHT: 170.2 LBS | HEART RATE: 87 BPM | SYSTOLIC BLOOD PRESSURE: 116 MMHG

## 2021-11-15 DIAGNOSIS — Z00.01 ENCOUNTER FOR ROUTINE ADULT MEDICAL EXAM WITH ABNORMAL FINDINGS: Primary | ICD-10-CM

## 2021-11-15 DIAGNOSIS — Z78.0 POST-MENOPAUSAL: ICD-10-CM

## 2021-11-15 DIAGNOSIS — F41.1 ANXIETY STATE: ICD-10-CM

## 2021-11-15 DIAGNOSIS — I10 ESSENTIAL HYPERTENSION: ICD-10-CM

## 2021-11-15 DIAGNOSIS — E78.5 DYSLIPIDEMIA: ICD-10-CM

## 2021-11-15 DIAGNOSIS — M35.01 SJOGREN'S SYNDROME WITH KERATOCONJUNCTIVITIS SICCA (H): ICD-10-CM

## 2021-11-15 DIAGNOSIS — J00 ACUTE RHINITIS: ICD-10-CM

## 2021-11-15 DIAGNOSIS — R79.89 ELEVATED LFTS: ICD-10-CM

## 2021-11-15 LAB
ALBUMIN SERPL-MCNC: 3.5 G/DL (ref 3.5–5)
ALP SERPL-CCNC: 96 U/L (ref 45–120)
ALT SERPL W P-5'-P-CCNC: 138 U/L (ref 0–45)
ANION GAP SERPL CALCULATED.3IONS-SCNC: 9 MMOL/L (ref 5–18)
AST SERPL W P-5'-P-CCNC: 187 U/L (ref 0–40)
BILIRUB SERPL-MCNC: 1.6 MG/DL (ref 0–1)
BUN SERPL-MCNC: 7 MG/DL (ref 8–28)
CALCIUM SERPL-MCNC: 9.3 MG/DL (ref 8.5–10.5)
CHLORIDE BLD-SCNC: 102 MMOL/L (ref 98–107)
CHOLEST SERPL-MCNC: 145 MG/DL
CO2 SERPL-SCNC: 25 MMOL/L (ref 22–31)
CREAT SERPL-MCNC: 0.85 MG/DL (ref 0.6–1.1)
ERYTHROCYTE [DISTWIDTH] IN BLOOD BY AUTOMATED COUNT: 12.8 % (ref 10–15)
FASTING STATUS PATIENT QL REPORTED: YES
GFR SERPL CREATININE-BSD FRML MDRD: 68 ML/MIN/1.73M2
GLUCOSE BLD-MCNC: 89 MG/DL (ref 70–125)
HCT VFR BLD AUTO: 38.1 % (ref 35–47)
HDLC SERPL-MCNC: 37 MG/DL
HGB BLD-MCNC: 13 G/DL (ref 11.7–15.7)
LDLC SERPL CALC-MCNC: 90 MG/DL
MCH RBC QN AUTO: 30.8 PG (ref 26.5–33)
MCHC RBC AUTO-ENTMCNC: 34.1 G/DL (ref 31.5–36.5)
MCV RBC AUTO: 90 FL (ref 78–100)
PLATELET # BLD AUTO: 154 10E3/UL (ref 150–450)
POTASSIUM BLD-SCNC: 4.3 MMOL/L (ref 3.5–5)
PROT SERPL-MCNC: 8.6 G/DL (ref 6–8)
RBC # BLD AUTO: 4.22 10E6/UL (ref 3.8–5.2)
SODIUM SERPL-SCNC: 136 MMOL/L (ref 136–145)
TRIGL SERPL-MCNC: 89 MG/DL
WBC # BLD AUTO: 5 10E3/UL (ref 4–11)

## 2021-11-15 PROCEDURE — 85027 COMPLETE CBC AUTOMATED: CPT | Performed by: FAMILY MEDICINE

## 2021-11-15 PROCEDURE — 36415 COLL VENOUS BLD VENIPUNCTURE: CPT | Performed by: FAMILY MEDICINE

## 2021-11-15 PROCEDURE — 80061 LIPID PANEL: CPT | Performed by: FAMILY MEDICINE

## 2021-11-15 PROCEDURE — 90471 IMMUNIZATION ADMIN: CPT | Performed by: FAMILY MEDICINE

## 2021-11-15 PROCEDURE — 90714 TD VACC NO PRESV 7 YRS+ IM: CPT | Performed by: FAMILY MEDICINE

## 2021-11-15 PROCEDURE — G0439 PPPS, SUBSEQ VISIT: HCPCS | Performed by: FAMILY MEDICINE

## 2021-11-15 PROCEDURE — 80053 COMPREHEN METABOLIC PANEL: CPT | Performed by: FAMILY MEDICINE

## 2021-11-15 PROCEDURE — 99213 OFFICE O/P EST LOW 20 MIN: CPT | Mod: 25 | Performed by: FAMILY MEDICINE

## 2021-11-15 RX ORDER — MUPIROCIN 20 MG/G
OINTMENT TOPICAL 2 TIMES DAILY
Qty: 30 G | Refills: 0 | Status: SHIPPED | OUTPATIENT
Start: 2021-11-15 | End: 2023-10-16

## 2021-11-15 ASSESSMENT — PATIENT HEALTH QUESTIONNAIRE - PHQ9
SUM OF ALL RESPONSES TO PHQ QUESTIONS 1-9: 2
SUM OF ALL RESPONSES TO PHQ QUESTIONS 1-9: 2
10. IF YOU CHECKED OFF ANY PROBLEMS, HOW DIFFICULT HAVE THESE PROBLEMS MADE IT FOR YOU TO DO YOUR WORK, TAKE CARE OF THINGS AT HOME, OR GET ALONG WITH OTHER PEOPLE: NOT DIFFICULT AT ALL

## 2021-11-15 ASSESSMENT — MIFFLIN-ST. JEOR: SCORE: 1304.65

## 2021-11-15 ASSESSMENT — ACTIVITIES OF DAILY LIVING (ADL): CURRENT_FUNCTION: NO ASSISTANCE NEEDED

## 2021-11-15 NOTE — PROGRESS NOTES
SUBJECTIVE:   Carolina García is a 76 year old female who presents for Preventive Visit.    Her medical history is significant for Sjogren's syndrome, gastric bypass, hypertension, elevated liver function tests, presyncope likely secondary to hypotension for overtreatment of blood pressure more recently, diverticular disease of the colon, osteoarthritis, postmenopausal state, acid reflux, anxiety.    She reports overall she is doing well.  She plans to speak with her rheumatologist soon to follow-up on Sjogren's.  Blood pressure is more than adequately controlled in fact still having some dizziness upon standing, discussed discontinuation of amlodipine today with continued blood pressure monitoring.      Reviewed and discussed routine health prevention.  Mammography upcoming.  Discussed immunizations.  Update tetanus today.  Discussed bone density screening, previous bone density in 2014 was normal, discussed rescreening since it has been greater than 5 years and she does have evolving risk factors.    Patient has been advised of split billing requirements and indicates understanding: Yes   Are you in the first 12 months of your Medicare coverage?  No    Do you feel safe in your environment? Yes    Have you ever done Advance Care Planning? (For example, a Health Directive, POLST, or a discussion with a medical provider or your loved ones about your wishes): Yes, advance care planning is on file.      Cognitive Screening   1) Repeat 3 items (Leader, Season, Table)    2) Clock draw: NORMAL  3) 3 item recall: Recalls 3 objects  Results: 3 items recalled: COGNITIVE IMPAIRMENT LESS LIKELY    Mini-CogTM Copyright CLARE Lopez. Licensed by the author for use in [facility]; reprinted with permission (mason@.[facility]). All rights reserved.      Do you have sleep apnea, excessive snoring or daytime drowsiness?: yes    Reviewed and updated as needed this visit by clinical staff    Reviewed and updated as needed this  "visit by Provider    Social History     Tobacco Use     Smoking status: Never Smoker     Smokeless tobacco: Never Used   Substance Use Topics     Alcohol use: Yes     Comment: Alcoholic Drinks/day: glass of wine on occasion     If you drink alcohol do you typically have >3 drinks per day or >7 drinks per week? No    Alcohol Use 11/15/2021   Prescreen: >3 drinks/day or >7 drinks/week? No   Prescreen: >3 drinks/day or >7 drinks/week? -       Current providers sharing in care for this patient include:   Patient Care Team:  Rich Kelly MD as PCP - General  Rich Kelly MD as Assigned PCP    The following health maintenance items are reviewed in Epic and correct as of today:  Health Maintenance Due   Topic Date Due     ANNUAL REVIEW OF  ORDERS  Never done     MENINGITIS IMMUNIZATION (1 - Risk start 2-23 months series) Never done     ZOSTER IMMUNIZATION (1 of 2) Never done     FALL RISK ASSESSMENT  Never done     PHQ-2  Never done     MAMMO SCREENING  04/05/2021     INFLUENZA VACCINE (1) Never done     Review of Systems  Complete review of systems is obtained.  Other than the specific considerations noted above complete review of systems is negative.    OBJECTIVE:   /64   Pulse 87   Ht 1.702 m (5' 7\")   Wt 77.2 kg (170 lb 3.2 oz)   SpO2 98%   BMI 26.66 kg/m   Estimated body mass index is 26.66 kg/m  as calculated from the following:    Height as of this encounter: 1.702 m (5' 7\").    Weight as of this encounter: 77.2 kg (170 lb 3.2 oz).     Physical Exam   General Appearance:    Alert, cooperative, no distress   Eyes:   No scleral icterus or conjunctival irritation       Ears:    Normal TM's and external ear canals, both ears   Throat:   Lips, mucosa, and tongue normal; teeth and gums normal   Neck:   Supple, symmetrical, trachea midline, no adenopathy;        thyroid:  No enlargement/tenderness/nodules   Lungs:     Clear to auscultation bilaterally, respirations unlabored, no wheezes or crackles " "  Heart:    Regular rate and rhythm,  No murmur   Extremities:  No edema, no joint swelling or redness, no evidence of any injuries   Skin:  No concerning skin findings, no suspicious moles, no rashes   Neurologic:  On gross examination there is no motor or sensory deficit.  Patient walks with a normal gait     ASSESSMENT / PLAN:   Carolina was seen today for wellness visit and thyroid problem.    Diagnoses and all orders for this visit:    Encounter for routine adult medical exam with abnormal findings    Anxiety state    Essential hypertension  -     Comprehensive metabolic panel (BMP + Alb, Alk Phos, ALT, AST, Total. Bili, TP)    Elevated LFTs  -     Comprehensive metabolic panel (BMP + Alb, Alk Phos, ALT, AST, Total. Bili, TP)  -     CBC with platelets    Sjogren's syndrome with keratoconjunctivitis sicca (H)    Dyslipidemia  -     Lipid panel reflex to direct LDL Fasting    Acute rhinitis  -     mupirocin (BACTROBAN) 2 % external ointment; Apply topically 2 times daily    Post-menopausal  -     DX Hip/Pelvis/Spine; Future    Other orders  -     TD PRESERV FREE, IM (7+ YRS) (DECAVAC/TENIVAC)           Patient has been advised of split billing requirements and indicates understanding: Yes  COUNSELING:  Reviewed preventive health counseling, as reflected in patient instructions       Regular exercise       Vision screening       Dental care       Fall risk prevention       Osteoporosis prevention/bone health    Estimated body mass index is 26.66 kg/m  as calculated from the following:    Height as of this encounter: 1.702 m (5' 7\").    Weight as of this encounter: 77.2 kg (170 lb 3.2 oz).    Weight management plan: Discussed healthy diet and exercise guidelines    She reports that she has never smoked. She has never used smokeless tobacco.      Appropriate preventive services were discussed with this patient, including applicable screening as appropriate for cardiovascular disease, diabetes, " osteopenia/osteoporosis, and glaucoma.  As appropriate for age/gender, discussed screening for colorectal cancer, prostate cancer, breast cancer, and cervical cancer. Checklist reviewing preventive services available has been given to the patient.    Reviewed patients plan of care and provided an AVS. The Basic Care Plan (routine screening as documented in Health Maintenance) for [place] meets the Care Plan requirement. This Care Plan has been established and reviewed with the Patient.    Counseling Resources:  ATP IV Guidelines  Pooled Cohorts Equation Calculator  Breast Cancer Risk Calculator  Breast Cancer: Medication to Reduce Risk  FRAX Risk Assessment  ICSI Preventive Guidelines  Dietary Guidelines for Americans, 2010  USDA's MyPlate  ASA Prophylaxis  Lung CA Screening    Rich Kelly MD, MD  [facility]    Identified Health Risks:

## 2021-11-16 ASSESSMENT — PATIENT HEALTH QUESTIONNAIRE - PHQ9: SUM OF ALL RESPONSES TO PHQ QUESTIONS 1-9: 2

## 2021-11-29 ENCOUNTER — ANCILLARY PROCEDURE (OUTPATIENT)
Dept: MAMMOGRAPHY | Facility: CLINIC | Age: 76
End: 2021-11-29
Attending: FAMILY MEDICINE
Payer: MEDICARE

## 2021-11-29 DIAGNOSIS — Z12.31 VISIT FOR SCREENING MAMMOGRAM: ICD-10-CM

## 2021-11-29 PROCEDURE — 77063 BREAST TOMOSYNTHESIS BI: CPT

## 2021-12-01 ENCOUNTER — HOSPITAL ENCOUNTER (OUTPATIENT)
Dept: ULTRASOUND IMAGING | Facility: HOSPITAL | Age: 76
End: 2021-12-01
Attending: NURSE PRACTITIONER
Payer: MEDICARE

## 2021-12-01 ENCOUNTER — LAB (OUTPATIENT)
Dept: LAB | Facility: HOSPITAL | Age: 76
End: 2021-12-01
Attending: NURSE PRACTITIONER
Payer: MEDICARE

## 2021-12-01 DIAGNOSIS — K74.60 LIVER CIRRHOSIS SECONDARY TO NASH (H): ICD-10-CM

## 2021-12-01 DIAGNOSIS — K75.81 LIVER CIRRHOSIS SECONDARY TO NONALCOHOLIC STEATOHEPATITIS (NASH) (H): Primary | ICD-10-CM

## 2021-12-01 DIAGNOSIS — M35.01 SJOGREN'S SYNDROME WITH KERATOCONJUNCTIVITIS SICCA (H): ICD-10-CM

## 2021-12-01 DIAGNOSIS — K75.81 LIVER CIRRHOSIS SECONDARY TO NASH (H): ICD-10-CM

## 2021-12-01 DIAGNOSIS — K74.60 LIVER CIRRHOSIS SECONDARY TO NONALCOHOLIC STEATOHEPATITIS (NASH) (H): Primary | ICD-10-CM

## 2021-12-01 LAB
AFP SERPL-MCNC: 3.5 NG/ML
ALBUMIN SERPL-MCNC: 3.5 G/DL (ref 3.5–5)
ALP SERPL-CCNC: 92 U/L (ref 45–120)
ALT SERPL W P-5'-P-CCNC: 98 U/L (ref 0–45)
AST SERPL W P-5'-P-CCNC: 132 U/L (ref 0–40)
BASOPHILS # BLD AUTO: 0 10E3/UL (ref 0–0.2)
BASOPHILS NFR BLD AUTO: 1 %
BILIRUB DIRECT SERPL-MCNC: 0.5 MG/DL
BILIRUB SERPL-MCNC: 1.7 MG/DL (ref 0–1)
CREAT SERPL-MCNC: 0.86 MG/DL (ref 0.6–1.1)
EOSINOPHIL # BLD AUTO: 0 10E3/UL (ref 0–0.7)
EOSINOPHIL NFR BLD AUTO: 1 %
ERYTHROCYTE [DISTWIDTH] IN BLOOD BY AUTOMATED COUNT: 13.3 % (ref 10–15)
GFR SERPL CREATININE-BSD FRML MDRD: 67 ML/MIN/1.73M2
HCT VFR BLD AUTO: 38.3 % (ref 35–47)
HGB BLD-MCNC: 12.8 G/DL (ref 11.7–15.7)
IMM GRANULOCYTES # BLD: 0 10E3/UL
IMM GRANULOCYTES NFR BLD: 0 %
LYMPHOCYTES # BLD AUTO: 1.1 10E3/UL (ref 0.8–5.3)
LYMPHOCYTES NFR BLD AUTO: 25 %
MCH RBC QN AUTO: 30.8 PG (ref 26.5–33)
MCHC RBC AUTO-ENTMCNC: 33.4 G/DL (ref 31.5–36.5)
MCV RBC AUTO: 92 FL (ref 78–100)
MONOCYTES # BLD AUTO: 0.4 10E3/UL (ref 0–1.3)
MONOCYTES NFR BLD AUTO: 9 %
NEUTROPHILS # BLD AUTO: 2.8 10E3/UL (ref 1.6–8.3)
NEUTROPHILS NFR BLD AUTO: 64 %
NRBC # BLD AUTO: 0 10E3/UL
NRBC BLD AUTO-RTO: 0 /100
PLATELET # BLD AUTO: 187 10E3/UL (ref 150–450)
PROT SERPL-MCNC: 8.6 G/DL (ref 6–8)
RBC # BLD AUTO: 4.15 10E6/UL (ref 3.8–5.2)
WBC # BLD AUTO: 4.3 10E3/UL (ref 4–11)

## 2021-12-01 PROCEDURE — 36415 COLL VENOUS BLD VENIPUNCTURE: CPT

## 2021-12-01 PROCEDURE — 76705 ECHO EXAM OF ABDOMEN: CPT

## 2021-12-01 PROCEDURE — 82565 ASSAY OF CREATININE: CPT

## 2021-12-01 PROCEDURE — 85025 COMPLETE CBC W/AUTO DIFF WBC: CPT

## 2021-12-01 PROCEDURE — 80076 HEPATIC FUNCTION PANEL: CPT

## 2021-12-01 PROCEDURE — 82105 ALPHA-FETOPROTEIN SERUM: CPT

## 2021-12-04 ENCOUNTER — TRANSFERRED RECORDS (OUTPATIENT)
Dept: HEALTH INFORMATION MANAGEMENT | Facility: CLINIC | Age: 76
End: 2021-12-04
Payer: MEDICARE

## 2021-12-06 ENCOUNTER — TRANSFERRED RECORDS (OUTPATIENT)
Dept: HEALTH INFORMATION MANAGEMENT | Facility: CLINIC | Age: 76
End: 2021-12-06
Payer: MEDICARE

## 2021-12-07 ENCOUNTER — ANCILLARY PROCEDURE (OUTPATIENT)
Dept: BONE DENSITY | Facility: CLINIC | Age: 76
End: 2021-12-07
Attending: FAMILY MEDICINE
Payer: MEDICARE

## 2021-12-07 DIAGNOSIS — Z78.0 POST-MENOPAUSAL: ICD-10-CM

## 2021-12-07 PROCEDURE — 77080 DXA BONE DENSITY AXIAL: CPT | Mod: TC | Performed by: RADIOLOGY

## 2021-12-14 ENCOUNTER — TELEPHONE (OUTPATIENT)
Dept: FAMILY MEDICINE | Facility: CLINIC | Age: 76
End: 2021-12-14
Payer: MEDICARE

## 2021-12-14 DIAGNOSIS — I10 ESSENTIAL HYPERTENSION: Primary | ICD-10-CM

## 2021-12-14 RX ORDER — AMLODIPINE BESYLATE 5 MG/1
5 TABLET ORAL DAILY
Qty: 30 TABLET | Refills: 0
Start: 2021-12-14 | End: 2022-02-16

## 2021-12-14 NOTE — TELEPHONE ENCOUNTER
"Arun walked in with a note regarding Carolina.    This is more of an fyi for Dr Kelly.    \"As of 12/1/21, I restarted my Amlodipine 5mg.  My blood pressure was 145/85 and 155/85.\"    To Dr Kelly as an fyi.  "

## 2021-12-17 ENCOUNTER — TRANSFERRED RECORDS (OUTPATIENT)
Dept: HEALTH INFORMATION MANAGEMENT | Facility: CLINIC | Age: 76
End: 2021-12-17
Payer: MEDICARE

## 2022-01-26 ENCOUNTER — TRANSFERRED RECORDS (OUTPATIENT)
Dept: HEALTH INFORMATION MANAGEMENT | Facility: CLINIC | Age: 77
End: 2022-01-26
Payer: MEDICARE

## 2022-01-27 DIAGNOSIS — M35.01 SJOGREN'S SYNDROME WITH KERATOCONJUNCTIVITIS SICCA (H): ICD-10-CM

## 2022-01-27 RX ORDER — PILOCARPINE HYDROCHLORIDE 5 MG/1
TABLET, FILM COATED ORAL
Qty: 180 TABLET | Refills: 0 | Status: SHIPPED | OUTPATIENT
Start: 2022-01-27 | End: 2022-05-12

## 2022-01-27 NOTE — TELEPHONE ENCOUNTER
Refill request received from [facility] for pilocarpine    Last OV 8/23/2021  Last lab 12/1/2021  Future appt 2/21/2022    Rx sent for review.

## 2022-02-10 ENCOUNTER — TRANSFERRED RECORDS (OUTPATIENT)
Dept: HEALTH INFORMATION MANAGEMENT | Facility: CLINIC | Age: 77
End: 2022-02-10
Payer: MEDICARE

## 2022-02-10 LAB — PHQ9 SCORE: 0

## 2022-02-13 DIAGNOSIS — I10 ESSENTIAL HYPERTENSION: ICD-10-CM

## 2022-02-16 RX ORDER — AMLODIPINE BESYLATE 5 MG/1
TABLET ORAL
Qty: 90 TABLET | Refills: 3 | Status: SHIPPED | OUTPATIENT
Start: 2022-02-16 | End: 2023-03-26

## 2022-03-10 ENCOUNTER — TRANSFERRED RECORDS (OUTPATIENT)
Dept: HEALTH INFORMATION MANAGEMENT | Facility: CLINIC | Age: 77
End: 2022-03-10
Payer: MEDICARE

## 2022-05-12 DIAGNOSIS — M35.01 SJOGREN'S SYNDROME WITH KERATOCONJUNCTIVITIS SICCA (H): ICD-10-CM

## 2022-05-12 RX ORDER — PILOCARPINE HYDROCHLORIDE 5 MG/1
TABLET, FILM COATED ORAL
Qty: 60 TABLET | Refills: 1 | Status: SHIPPED | OUTPATIENT
Start: 2022-05-12 | End: 2022-06-07

## 2022-05-12 NOTE — TELEPHONE ENCOUNTER
Please discuss with the patient:    Pilocarpine refilled, recommend that she have standing order labs performed prior to upcoming follow-up reassessment with us.  Please reorder hepatic function panel, CBC with differential and creatinine.

## 2022-05-12 NOTE — TELEPHONE ENCOUNTER
Last ov-8/23/21  Last labs-12/1/21    Future appt-6/20/22    Please advise refill, outside RN refill protocol range

## 2022-05-13 NOTE — TELEPHONE ENCOUNTER
Please call pt to schedule lab only appt prior to Dr Townsend's f/u appt in June    Lab orders in chart

## 2022-06-02 ENCOUNTER — LAB (OUTPATIENT)
Dept: LAB | Facility: HOSPITAL | Age: 77
End: 2022-06-02
Attending: NURSE PRACTITIONER
Payer: COMMERCIAL

## 2022-06-02 ENCOUNTER — HOSPITAL ENCOUNTER (OUTPATIENT)
Dept: ULTRASOUND IMAGING | Facility: HOSPITAL | Age: 77
Discharge: HOME | End: 2022-06-02
Attending: NURSE PRACTITIONER
Payer: COMMERCIAL

## 2022-06-02 DIAGNOSIS — K75.81 LIVER CIRRHOSIS SECONDARY TO NASH (H): ICD-10-CM

## 2022-06-02 DIAGNOSIS — M35.01 SJOGREN'S SYNDROME WITH KERATOCONJUNCTIVITIS SICCA (H): ICD-10-CM

## 2022-06-02 DIAGNOSIS — K75.81 LIVER CIRRHOSIS SECONDARY TO NONALCOHOLIC STEATOHEPATITIS (NASH) (H): Primary | ICD-10-CM

## 2022-06-02 DIAGNOSIS — K74.60 LIVER CIRRHOSIS SECONDARY TO NASH (H): ICD-10-CM

## 2022-06-02 DIAGNOSIS — K74.60 LIVER CIRRHOSIS SECONDARY TO NONALCOHOLIC STEATOHEPATITIS (NASH) (H): Primary | ICD-10-CM

## 2022-06-02 LAB
ALBUMIN SERPL-MCNC: 3.5 G/DL (ref 3.5–5)
ALP SERPL-CCNC: 98 U/L (ref 45–120)
ALT SERPL W P-5'-P-CCNC: 93 U/L (ref 0–45)
AST SERPL W P-5'-P-CCNC: 128 U/L (ref 0–40)
BASOPHILS # BLD AUTO: 0 10E3/UL (ref 0–0.2)
BASOPHILS NFR BLD AUTO: 0 %
BILIRUB DIRECT SERPL-MCNC: 0.5 MG/DL
BILIRUB SERPL-MCNC: 1.4 MG/DL (ref 0–1)
CREAT SERPL-MCNC: 0.91 MG/DL (ref 0.6–1.1)
EOSINOPHIL # BLD AUTO: 0 10E3/UL (ref 0–0.7)
EOSINOPHIL NFR BLD AUTO: 0 %
ERYTHROCYTE [DISTWIDTH] IN BLOOD BY AUTOMATED COUNT: 13.1 % (ref 10–15)
GFR SERPL CREATININE-BSD FRML MDRD: 65 ML/MIN/1.73M2
HCT VFR BLD AUTO: 37.9 % (ref 35–47)
HGB BLD-MCNC: 12.8 G/DL (ref 11.7–15.7)
IMM GRANULOCYTES # BLD: 0 10E3/UL
IMM GRANULOCYTES NFR BLD: 0 %
LYMPHOCYTES # BLD AUTO: 1 10E3/UL (ref 0.8–5.3)
LYMPHOCYTES NFR BLD AUTO: 22 %
MCH RBC QN AUTO: 31.1 PG (ref 26.5–33)
MCHC RBC AUTO-ENTMCNC: 33.8 G/DL (ref 31.5–36.5)
MCV RBC AUTO: 92 FL (ref 78–100)
MONOCYTES # BLD AUTO: 0.4 10E3/UL (ref 0–1.3)
MONOCYTES NFR BLD AUTO: 8 %
NEUTROPHILS # BLD AUTO: 3.1 10E3/UL (ref 1.6–8.3)
NEUTROPHILS NFR BLD AUTO: 70 %
NRBC # BLD AUTO: 0 10E3/UL
NRBC BLD AUTO-RTO: 0 /100
PLATELET # BLD AUTO: 157 10E3/UL (ref 150–450)
PROT SERPL-MCNC: 8.3 G/DL (ref 6–8)
RBC # BLD AUTO: 4.12 10E6/UL (ref 3.8–5.2)
WBC # BLD AUTO: 4.5 10E3/UL (ref 4–11)

## 2022-06-02 PROCEDURE — 82105 ALPHA-FETOPROTEIN SERUM: CPT

## 2022-06-02 PROCEDURE — 82565 ASSAY OF CREATININE: CPT

## 2022-06-02 PROCEDURE — 80076 HEPATIC FUNCTION PANEL: CPT

## 2022-06-02 PROCEDURE — 76705 ECHO EXAM OF ABDOMEN: CPT

## 2022-06-02 PROCEDURE — 85025 COMPLETE CBC W/AUTO DIFF WBC: CPT

## 2022-06-02 PROCEDURE — 36415 COLL VENOUS BLD VENIPUNCTURE: CPT

## 2022-06-03 LAB — AFP SERPL-MCNC: 3.5 NG/ML

## 2022-06-05 ENCOUNTER — TRANSFERRED RECORDS (OUTPATIENT)
Dept: HEALTH INFORMATION MANAGEMENT | Facility: CLINIC | Age: 77
End: 2022-06-05
Payer: COMMERCIAL

## 2022-06-06 DIAGNOSIS — M35.01 SJOGREN'S SYNDROME WITH KERATOCONJUNCTIVITIS SICCA (H): ICD-10-CM

## 2022-06-07 ENCOUNTER — TRANSFERRED RECORDS (OUTPATIENT)
Dept: HEALTH INFORMATION MANAGEMENT | Facility: CLINIC | Age: 77
End: 2022-06-07
Payer: COMMERCIAL

## 2022-06-07 RX ORDER — PILOCARPINE HYDROCHLORIDE 5 MG/1
TABLET, FILM COATED ORAL
Qty: 60 TABLET | Refills: 0 | Status: SHIPPED | OUTPATIENT
Start: 2022-06-07 | End: 2022-06-20 | Stop reason: ALTCHOICE

## 2022-06-20 ENCOUNTER — OFFICE VISIT (OUTPATIENT)
Dept: RHEUMATOLOGY | Facility: CLINIC | Age: 77
End: 2022-06-20
Payer: COMMERCIAL

## 2022-06-20 VITALS
SYSTOLIC BLOOD PRESSURE: 108 MMHG | HEART RATE: 80 BPM | DIASTOLIC BLOOD PRESSURE: 60 MMHG | BODY MASS INDEX: 26.31 KG/M2 | WEIGHT: 168 LBS

## 2022-06-20 DIAGNOSIS — M35.01 SJOGREN'S SYNDROME WITH KERATOCONJUNCTIVITIS SICCA (H): Primary | ICD-10-CM

## 2022-06-20 DIAGNOSIS — M70.62 GREATER TROCHANTERIC BURSITIS OF LEFT HIP: ICD-10-CM

## 2022-06-20 DIAGNOSIS — G89.29 CHRONIC NECK PAIN: ICD-10-CM

## 2022-06-20 DIAGNOSIS — R79.89 ELEVATED LFTS: ICD-10-CM

## 2022-06-20 DIAGNOSIS — M15.0 PRIMARY OSTEOARTHRITIS INVOLVING MULTIPLE JOINTS: ICD-10-CM

## 2022-06-20 DIAGNOSIS — M54.2 CHRONIC NECK PAIN: ICD-10-CM

## 2022-06-20 DIAGNOSIS — R68.2 DRY MOUTH: ICD-10-CM

## 2022-06-20 PROCEDURE — 99214 OFFICE O/P EST MOD 30 MIN: CPT | Performed by: INTERNAL MEDICINE

## 2022-06-20 RX ORDER — CEVIMELINE HYDROCHLORIDE 30 MG/1
30 CAPSULE ORAL 3 TIMES DAILY
Qty: 90 CAPSULE | Refills: 2 | Status: SHIPPED | OUTPATIENT
Start: 2022-06-20 | End: 2022-06-20

## 2022-06-20 RX ORDER — CEVIMELINE HYDROCHLORIDE 30 MG/1
CAPSULE ORAL
Qty: 90 CAPSULE | Refills: 2 | Status: SHIPPED | OUTPATIENT
Start: 2022-06-20 | End: 2022-07-08

## 2022-06-20 NOTE — PATIENT INSTRUCTIONS
Summary of Your Rheumatology Visit    Next Appointment:   6 Months      Medications:    Please follow directives on pill bottle on how to take medication(s) provided.  Hold Pilocarpine while on Cevimeline/Evoxac      Referrals:      Tests:     Please have labs performed in about 3 weeks.      Injections:        Other:

## 2022-06-20 NOTE — PROGRESS NOTES
Carolina García who presents today with a chief complaint of  RECHECK      Joint Pains: No  Location: n/a  Onset: chronic  Intensity: 0 /10  AM Stiffness: 1-2 hours  Alleviating/Aggravating Factors: Medications helpful? The Pilocarpine not working  Tolerating Meds: Yes  Other:      ROS:  Patient denies having any active: chest pain, shortness of breath, cough, abdominal pain, nausea, vomiting, rashes, documented fevers, oral ulcers and recent infections.  Patient admits to having a good appetite  Information gathered by medical assistant incorporated into this note, was reviewed and discussed with the patient.    Problem List:  Patient Active Problem List   Diagnosis     Anxiety     Hypertension     Rotator Cuff Tendonitis     Esophageal Reflux     Allergic Rhinitis     Lower Back Pain Chronic     Vitamin D Deficiency     Menopause Has Occurred     Cervical radiculopathy at C5     Myelopathy, spondylogenic, cervical     Subacute cutaneous lupus erythematosus     Hypocomplementemia (H)     Sjogren's syndrome (H)     Primary osteoarthritis involving multiple joints     THEE positive     Localized primary carpometacarpal osteoarthritis     Abnormal nuclear stress test     Precordial chest pain     Nonspecific abdominal pain     Nausea and vomiting     Diverticular disease of colon     Abnormal liver function tests        PMH:   Past Medical History:   Diagnosis Date     GERD (gastroesophageal reflux disease)      Hypertension      Neck pain      Osteoarthritis 1/13/2016     Systemic lupus (H)        Surgical History:  Past Surgical History:   Procedure Laterality Date     ARTHROPLASTY KNEE BILATERAL       BIOPSY BREAST Right 2005    benign     CV CORONARY ANGIOGRAM N/A 8/22/2019    Procedure: Coronary Angiogram;  Surgeon: Melissa Vasquez MD;  Location: [facility] Cath Lab;  Service: Cardiology     CV LEFT HEART CATHETERIZATION WITHOUT LEFT VENTRICULOGRAM Left 8/22/2019    Procedure: Left Heart Catheterization Without  Left Ventriculogram;  Surgeon: Melissa Vasquez MD;  Location: [facility] Cath Lab;  Service: Cardiology     FISSURECTOMY RECTUM       HC REMOVAL GALLBLADDER      Description: Cholecystectomy;  Recorded: 04/16/2012;     HYSTERECTOMY  1986     IN TOT DISC ARTHRP ART DISC ANT APPRO 1 NTRSPC CRV N/A 6/17/2015    Procedure:  C45 MOBI-C DISC ARTHORPLASTY, ATTEMPT MOBI C C56, ANTERIOR CERVICAL DISCECTOMY AND FUSION IF UNABLE TO PLACE ;  Surgeon: Korina Beltrán MD;  Location: [facility] Main OR;  Service: Spine     ZZC GASTROPLASTY,OBESITY,OTHER      Description: Gastric Surgery For Morbid Obesity Gastric Stapling;  Recorded: 04/16/2012;       Family History:  Family History   Problem Relation Age of Onset     Breast Cancer Sister 52.00     Cancer Sister      Breast Cancer Sister 60.00       Social History:   reports that she has never smoked. She has never used smokeless tobacco. She reports current alcohol use. She reports that she does not use drugs.    Allergies:  Allergies   Allergen Reactions     Hydroxychloroquine Shortness Of Breath     Codeine Unknown     Emesis, tired     Egg White [Albumin, Egg] Unknown     Dumping syndrome        Current Medications:  Current Outpatient Medications   Medication Sig Dispense Refill     acetaminophen (TYLENOL) 500 MG tablet [ACETAMINOPHEN (TYLENOL) 500 MG TABLET] Take 500 mg by mouth 2 (two) times a day.       amLODIPine (NORVASC) 5 MG tablet TAKE 1 TABLET BY MOUTH EVERY DAY 90 tablet 3     CARBOXYMETHYLCELL/HYPROMELLOSE (GENTEAL GEL OPHT) [CARBOXYMETHYLCELL/HYPROMELLOSE (GENTEAL GEL OPHT)] Apply 2 drops to eye as needed.       cevimeline (EVOXAC) 30 mg capsule [CEVIMELINE (EVOXAC) 30 MG CAPSULE] Take 1 capsule by mouth daily.        citalopram (CELEXA) 20 MG tablet TAKE 1 & 1/2 TABLETS BY MOUTH ONCE A  tablet 2     diclofenac sodium (VOLTAREN) 1 % Gel [DICLOFENAC SODIUM (VOLTAREN) 1 % GEL] Apply 2 g topically daily as needed.       loratadine (CLARITIN) 10 mg tablet  [LORATADINE (CLARITIN) 10 MG TABLET] Take 10 mg by mouth daily.       losartan (COZAAR) 100 MG tablet TAKE 1 TABLET BY MOUTH ONCE A DAY 90 tablet 3     multivitamin therapeutic tablet [MULTIVITAMIN THERAPEUTIC TABLET] Take 1 tablet by mouth daily.       mupirocin (BACTROBAN) 2 % external ointment Apply topically 2 times daily 30 g 0     OMEGA-3/DHA/EPA/FISH OIL (FISH OIL-OMEGA-3 FATTY ACIDS) 300-1,000 mg capsule [OMEGA-3/DHA/EPA/FISH OIL (FISH OIL-OMEGA-3 FATTY ACIDS) 300-1,000 MG CAPSULE] Take 2 g by mouth daily.       omeprazole (PRILOSEC) 20 MG capsule [OMEPRAZOLE (PRILOSEC) 20 MG CAPSULE] Take 2 capsules (40 mg total) by mouth 2 (two) times a day before meals. 120 capsule 1     pilocarpine (SALAGEN) 5 MG tablet Take 1 tablet p.o. two times a day prn 60 tablet 0     polyvinyl alcohol (LIQUIFILM TEARS) 1.4 % ophthalmic solution [POLYVINYL ALCOHOL (LIQUIFILM TEARS) 1.4 % OPHTHALMIC SOLUTION] Administer 1 drop to both eyes as needed for dry eyes.             Physical Exam:  /60 (BP Location: Right arm, Patient Position: Sitting, Cuff Size: Adult Regular)   Pulse 80   Wt 76.2 kg (168 lb)   BMI 26.31 kg/m    General: A & O x 3 in NAD  HEENT: EOMI, Non injected/non icteric sclera, no oral lesions noted  CV: s1s2 with RRR, no rubs appreciated   Lungs: CTA B/L, no wheezing , rales or rhonci appreciated  GI: Soft, NT/ND, no rebound, no guarding noted  MS: Hypertrophic changes nonevolving hand joints, nontender.  Otherwise patient demonstrated good passive/active ROM over other joints with no warmth, erythema, tenderness or synovitis noted over these joints.      Summary/Assessment:    History that includes Sjogren's, osteoarthritis, subcutaneous lupus.    Presents for follow-up visit.    States still experiencing dry mouth despite pilocarpine 1 tablet twice a day (cannot tolerate 3 times daily dosing, contributes to lightheadedness).  Is interested in trying another medication to see if provides better  relief.    Established with hematologist, follows up regularly.  History of biopsy-proven cirrhosis of unclear etiology.  Denies alcohol beverage intake.  Latest liver panel noted to be stable.      Please see below for management plan.    From prior note: Was a patient of Dr. Resendiz, last seen February 2020.    History that includes Sjogren's, osteoarthritis, subcutaneous lupus.    Presents today complaining of having right fifth PIP pain.  X-rays performed by orthopedics showed signs of moderate DJD involving this joint.  Patient to having some DJD involving other joints of right hip.  Received cortisone injection by orthopedics about 5 months ago with good benefit however pain resurfacing.    Utilizes Voltaren gel periodically with some relief.    Has chronic transaminitis perhaps related to NSAID use.  There was increase in baseline levels couple of months ago, patient discontinued Aleve resulting in some improvement.    States height is 5 foot 7 weighs 179 pounds, perhaps also has component of fatty liver playing a role.    Admits to having sicca symptoms.  Was taking Evoxac with some benefit however too costly, desires to try another medication, does not recall trying pilocarpine.  Did not tolerate Plaquenil well.    Currently not taking any calcium or vitamin D, takes a supplement.      Not much benefit with Tylenol    Pertinent rheumatology/past medical history (please refer to above for more detailed history):      Sjogren's    Subcutaneous lupus (biopsy positive per patient)    Elevated rheumatoid factor    Osteoarthritis    Chronic right fifth digit/PIP pain    Transaminitis, Liver Cirrhosis       Rheumatology medications provided/suggested:      Diclofenac gel  Evoxac    Pertinent medication from other providers or from otc (please refer to above for more detailed med list):    Celexa  Multivitamin    Pertinent medications already tried:     Evoxac (costly and not as beneficial as  pilocarpine)  Hydroxychloroquine (intolerance)  Pilocarpine (twice daily only partially effective)    Pertinent lab history:    Elevated: THEE, SSA/SSB antibodies, rheumatoid factor    Negative/unremarkable:  CCP antibody, other THEE related subsets, hep B&C      Pertinent imaging/test history:    2020: X-rays performed at orthopedics showed signs of osteoarthritis involving right fifth PIP joint and other joints involving this second.      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    Unable to order vitamin D level based on patient's insurance.        Plan:      Hold pilocarpine, will try Evoxac instead.    If Evoxac is cost prohibitive or ineffective a consideration is supplementing for Plaquenil (with or without pilocarpine or Evoxac).  Handouts on both Evoxac and Plaquenil provided to the patient.    Continue conservative measures for Sjogren's.    Continue following up with [facility]/hepatologist for liver cirrhosis of unclear etiology.    Continue diclofenac gel on a very limited basis, no more than 1-2 times per per week, limiting due to liver disease.    Continue exercises provided by OT, as tolerated.    Occasionally wears brace at night for right index pain, finds helpful.    Will check labs in about 3 weeks.    Follow-up in 6 months.          This note was transcribed using Dragon voice recognition software as a result unintentional grammatical errors or word substitutions may have occurred. Please contact our Rheumatology department if you need any clarification or if you have any related inquiries.        Westley Townsend DO....................  6/20/2022   2:16 PM

## 2022-06-23 ENCOUNTER — TRANSFERRED RECORDS (OUTPATIENT)
Dept: HEALTH INFORMATION MANAGEMENT | Facility: CLINIC | Age: 77
End: 2022-06-23

## 2022-07-05 ENCOUNTER — TELEPHONE (OUTPATIENT)
Dept: RHEUMATOLOGY | Facility: CLINIC | Age: 77
End: 2022-07-05

## 2022-07-05 NOTE — TELEPHONE ENCOUNTER
rx refill:    Pilocarpine 5 mg    Last ov: 6/20/22  Last lab: 6/2/22  Next ov: none    Please review rx request for refill from [facility] pharmacy.

## 2022-07-08 DIAGNOSIS — M35.01 SJOGREN'S SYNDROME WITH KERATOCONJUNCTIVITIS SICCA (H): Primary | ICD-10-CM

## 2022-07-08 NOTE — TELEPHONE ENCOUNTER
Pt state she never started Evoxac as it was too expensive, she remained on pilocarpine twice daily and would like to have that refilled.     Ok to refill?

## 2022-07-08 NOTE — TELEPHONE ENCOUNTER
Patient is calling to check the status of her Philocarpine refill. She states she is still taking it. Pharmacy is [facility]/pharmacy #[id number] - [address] (Ph: [phone])

## 2022-07-08 NOTE — TELEPHONE ENCOUNTER
Per notes on 6/20/22:    Plan:       Hold pilocarpine, will try Evoxac instead.    If Evoxac is cost prohibitive or ineffective a consideration is supplementing for Plaquenil (with or without pilocarpine or Evoxac).  Handouts on both Evoxac and Plaquenil provided to the patient.      Continue conservative measures for Sjogren's.    Continue following up with [facility]/hepatologist for liver cirrhosis of unclear etiology.    Continue diclofenac gel on a very limited basis, no more than 1-2 times per per week, limiting due to liver disease.    Continue exercises provided by OT, as tolerated.    Occasionally wears brace at night for right index pain, finds helpful.    Will check labs in about 3 weeks.    Follow-up in 6 months.         Please advise, if pt should still be taking.

## 2022-07-11 RX ORDER — PILOCARPINE HYDROCHLORIDE 5 MG/1
5 TABLET, FILM COATED ORAL 2 TIMES DAILY PRN
Qty: 180 TABLET | Refills: 0 | Status: SHIPPED | OUTPATIENT
Start: 2022-07-11 | End: 2022-08-17

## 2022-08-08 ENCOUNTER — TRANSFERRED RECORDS (OUTPATIENT)
Dept: HEALTH INFORMATION MANAGEMENT | Facility: CLINIC | Age: 77
End: 2022-08-08

## 2022-08-16 DIAGNOSIS — M35.01 SJOGREN'S SYNDROME WITH KERATOCONJUNCTIVITIS SICCA (H): ICD-10-CM

## 2022-08-16 NOTE — TELEPHONE ENCOUNTER
Please call patient to schedule appointment to establish with new Rheum provider for medication refills.    Or pt can request refill from PCP

## 2022-08-17 RX ORDER — PILOCARPINE HYDROCHLORIDE 5 MG/1
5 TABLET, FILM COATED ORAL 2 TIMES DAILY PRN
Qty: 180 TABLET | Refills: 0 | Status: SHIPPED | OUTPATIENT
Start: 2022-08-17 | End: 2023-01-02

## 2022-08-17 NOTE — TELEPHONE ENCOUNTER
Spoke to pt, appt scheduled with Saranya Law PA-C for 12/29/22.    Routed to RHEUMATOLOGY SUPPORT POOL

## 2022-08-30 DIAGNOSIS — I10 ESSENTIAL HYPERTENSION: ICD-10-CM

## 2022-08-30 RX ORDER — LOSARTAN POTASSIUM 100 MG/1
100 TABLET ORAL DAILY
Qty: 90 TABLET | Refills: 1 | Status: SHIPPED | OUTPATIENT
Start: 2022-08-30 | End: 2023-03-10

## 2022-08-30 NOTE — TELEPHONE ENCOUNTER
"Last Written Prescription Date:  7/20/21  Last Fill Quantity: 90,  # refills: 3   Last office visit provider:  11/15/21     Requested Prescriptions   Pending Prescriptions Disp Refills     losartan (COZAAR) 100 MG tablet [Pharmacy Med Name: LOSARTAN POTASSIUM 100 MG TAB] 90 tablet 3     Sig: TAKE 1 TABLET BY MOUTH ONCE A DAY       Angiotensin-II Receptors Passed - 8/30/2022 10:43 AM        Passed - Last blood pressure under 140/90 in past 12 months     BP Readings from Last 3 Encounters:   06/20/22 108/60   11/15/21 116/64   08/23/21 108/68                 Passed - Recent (12 mo) or future (30 days) visit within the authorizing provider's specialty     Patient has had an office visit with the authorizing provider or a provider within the authorizing providers department within the previous 12 mos or has a future within next 30 days. See \"Patient Info\" tab in inbasket, or \"Choose Columns\" in Meds & Orders section of the refill encounter.              Passed - Medication is active on med list        Passed - Patient is age 18 or older        Passed - No active pregnancy on record        Passed - Normal serum creatinine on file in past 12 months     Recent Labs   Lab Test 06/02/22  1121   CR 0.91       Ok to refill medication if creatinine is low          Passed - Normal serum potassium on file in past 12 months     Recent Labs   Lab Test 11/15/21  1044   POTASSIUM 4.3                    Passed - No positive pregnancy test in past 12 months             Efe Godwin RN 08/30/22 10:43 AM  "

## 2022-09-18 ENCOUNTER — HEALTH MAINTENANCE LETTER (OUTPATIENT)
Age: 77
End: 2022-09-18

## 2022-09-23 DIAGNOSIS — F41.1 ANXIETY STATE: ICD-10-CM

## 2022-09-24 RX ORDER — CITALOPRAM HYDROBROMIDE 20 MG/1
TABLET ORAL
Qty: 45 TABLET | Refills: 0 | Status: SHIPPED | OUTPATIENT
Start: 2022-09-24 | End: 2022-11-29

## 2022-09-25 NOTE — TELEPHONE ENCOUNTER
"Last Written Prescription Date:  10/27/21  Last Fill Quantity: 135,  # refills: 2   Last office visit provider:  11/15/21     Requested Prescriptions   Pending Prescriptions Disp Refills     citalopram (CELEXA) 20 MG tablet [Pharmacy Med Name: CITALOPRAM HBR 20 MG TABLET] 135 tablet 2     Sig: TAKE 1 & 1/2 TABLETS BY MOUTH ONCE A DAY       SSRIs Protocol Passed - 9/23/2022  4:27 PM        Passed - Recent (12 mo) or future (30 days) visit within the authorizing provider's specialty     Patient has had an office visit with the authorizing provider or a provider within the authorizing providers department within the previous 12 mos or has a future within next 30 days. See \"Patient Info\" tab in inbasket, or \"Choose Columns\" in Meds & Orders section of the refill encounter.              Passed - Medication is active on med list        Passed - Patient is age 18 or older        Passed - No active pregnancy on record        Passed - No positive pregnancy test in last 12 months             Elzbieta Cavazos, RN 09/24/22 8:17 PM  "

## 2022-11-04 ENCOUNTER — MEDICAL CORRESPONDENCE (OUTPATIENT)
Dept: HEALTH INFORMATION MANAGEMENT | Facility: CLINIC | Age: 77
End: 2022-11-04

## 2022-11-09 ENCOUNTER — HOSPITAL ENCOUNTER (OUTPATIENT)
Dept: ULTRASOUND IMAGING | Facility: HOSPITAL | Age: 77
Discharge: HOME | End: 2022-11-09
Attending: NURSE PRACTITIONER
Payer: COMMERCIAL

## 2022-11-09 ENCOUNTER — LAB (OUTPATIENT)
Dept: LAB | Facility: HOSPITAL | Age: 77
End: 2022-11-09
Attending: NURSE PRACTITIONER
Payer: COMMERCIAL

## 2022-11-09 DIAGNOSIS — K74.60 LIVER CIRRHOSIS SECONDARY TO NONALCOHOLIC STEATOHEPATITIS (NASH) (H): ICD-10-CM

## 2022-11-09 DIAGNOSIS — K74.60 LIVER CIRRHOSIS SECONDARY TO NONALCOHOLIC STEATOHEPATITIS (NASH) (H): Primary | ICD-10-CM

## 2022-11-09 DIAGNOSIS — K75.81 LIVER CIRRHOSIS SECONDARY TO NONALCOHOLIC STEATOHEPATITIS (NASH) (H): ICD-10-CM

## 2022-11-09 DIAGNOSIS — K75.81 LIVER CIRRHOSIS SECONDARY TO NONALCOHOLIC STEATOHEPATITIS (NASH) (H): Primary | ICD-10-CM

## 2022-11-09 LAB
AFP SERPL-MCNC: 4.6 NG/ML
ALBUMIN SERPL BCG-MCNC: 3.3 G/DL (ref 3.5–5.2)
ALP SERPL-CCNC: 95 U/L (ref 35–104)
ALT SERPL W P-5'-P-CCNC: 109 U/L (ref 10–35)
AST SERPL W P-5'-P-CCNC: 133 U/L (ref 10–35)
BILIRUB DIRECT SERPL-MCNC: <0.2 MG/DL (ref 0–0.3)
BILIRUB SERPL-MCNC: 0.7 MG/DL
ERYTHROCYTE [DISTWIDTH] IN BLOOD BY AUTOMATED COUNT: 13.1 % (ref 10–15)
HCT VFR BLD AUTO: 33.4 % (ref 35–47)
HGB BLD-MCNC: 11.2 G/DL (ref 11.7–15.7)
MCH RBC QN AUTO: 30.9 PG (ref 26.5–33)
MCHC RBC AUTO-ENTMCNC: 33.5 G/DL (ref 31.5–36.5)
MCV RBC AUTO: 92 FL (ref 78–100)
PLATELET # BLD AUTO: 160 10E3/UL (ref 150–450)
PROT SERPL-MCNC: 7.7 G/DL (ref 6.4–8.3)
RBC # BLD AUTO: 3.63 10E6/UL (ref 3.8–5.2)
WBC # BLD AUTO: 3.6 10E3/UL (ref 4–11)

## 2022-11-09 PROCEDURE — 82105 ALPHA-FETOPROTEIN SERUM: CPT

## 2022-11-09 PROCEDURE — 85027 COMPLETE CBC AUTOMATED: CPT

## 2022-11-09 PROCEDURE — 76705 ECHO EXAM OF ABDOMEN: CPT

## 2022-11-09 PROCEDURE — 36415 COLL VENOUS BLD VENIPUNCTURE: CPT

## 2022-11-09 PROCEDURE — 80076 HEPATIC FUNCTION PANEL: CPT

## 2022-11-11 ENCOUNTER — TRANSFERRED RECORDS (OUTPATIENT)
Dept: HEALTH INFORMATION MANAGEMENT | Facility: CLINIC | Age: 77
End: 2022-11-11

## 2022-12-28 ENCOUNTER — TRANSFERRED RECORDS (OUTPATIENT)
Dept: HEALTH INFORMATION MANAGEMENT | Facility: CLINIC | Age: 77
End: 2022-12-28

## 2022-12-29 ENCOUNTER — OFFICE VISIT (OUTPATIENT)
Dept: RHEUMATOLOGY | Facility: CLINIC | Age: 77
End: 2022-12-29
Payer: COMMERCIAL

## 2022-12-29 VITALS
HEART RATE: 89 BPM | DIASTOLIC BLOOD PRESSURE: 60 MMHG | WEIGHT: 155 LBS | OXYGEN SATURATION: 95 % | SYSTOLIC BLOOD PRESSURE: 130 MMHG | BODY MASS INDEX: 24.28 KG/M2

## 2022-12-29 DIAGNOSIS — Z79.899 HIGH RISK MEDICATION USE: ICD-10-CM

## 2022-12-29 DIAGNOSIS — M35.01 SJOGREN'S SYNDROME WITH KERATOCONJUNCTIVITIS SICCA (H): Primary | ICD-10-CM

## 2022-12-29 PROCEDURE — 99214 OFFICE O/P EST MOD 30 MIN: CPT | Performed by: PHYSICIAN ASSISTANT

## 2022-12-29 RX ORDER — HYDROXYCHLOROQUINE SULFATE 200 MG/1
200 TABLET, FILM COATED ORAL EVERY OTHER DAY
Qty: 15 TABLET | Refills: 2 | Status: SHIPPED | OUTPATIENT
Start: 2022-12-29 | End: 2023-10-03

## 2022-12-29 NOTE — PROGRESS NOTES
Rheumatology Clinic Visit  [facility]  Saranya Law, PATRIZIA     Carolina García MRN# [id number]   YOB: 1945 Age: 77 year old   Date of Visit: 12/29/2022  Primary care provider: Rich Kelly          Assessment and Plan:   1.  Sjogren's syndrome with keratoconjunctivitis sicca  2.  High-risk medication use    Patient presents today to establish care for her Sjogren's syndrome.  She states that she was unable to afford the Evoxac so she went back on the pilocarpine twice daily.  She states that she continues to struggle with dry mouth and dry eyes.  She has joint pain as well.  Worse with the weather changes.  She reports being interested in retrying the hydroxychloroquine.  She did not have an allergic reaction to the hydroxychloroquine.  She states that she felt like she was getting progressive shortness of breath/feeling winded while she was on it.  Physical examination did not show any active synovitis, dactylitis, tenosynovitis or enthesopathy.  She has dry mucous membranes.  Previous laboratory evaluations and imaging studies were reviewed.  Results below.    Patient can continue on the pilocarpine 5 mg twice daily.  She is interested in really trialing in the hydroxychloroquine that she tried many years ago.  She states she did not have an allergic reaction to it.  She states that she felt progressively winded as she took the medication.  We will retrial this medication at a lower dose of 200 mg every other day versus daily dosage.  Also need to be mindful of her liver cirrhosis and hepatic function while on this medication.  We did discuss potential side effects including retinal toxicity with this medication.  She does follow regularly with her eye doctor.  We also discussed that given her liver disease there may be a slight increased risk of toxicity from the medication.  She will closely monitor for this.  She will stop the medication should she have any side effects or feel like  that shortness of breath returns.  We will do close toxicity monitoring of her lab work monthly for the first 3 months.  I would also like to see her in 3 months for reevaluation.    Plan:     1. Schedule follow-up with Saranya Law PA-C in 3 months.   2. Labs: CBC, Creatinine, Albumin, AST, ALT monthly for 3 months  3. Medication recommendations:   a. Continue Pilocarpine 5mg twice daily  b. Start Hydroxychloroquine 200mg: take 1 tablet every other day-you will need a yearly eye exam on this medication  i. # Hydroxychloroquine (Plaquenil) Risks and Benefits:  The risks and benefits of hydroxychloroquine were discussed in detail and the patient verbalized understanding; the patient also verbalized agreement to get the required ophthalmologic toxicity monitoring.  The risks discussed include, but are not limited to, the risk for hypersensitivity, anaphylaxis, anaphylactoid reactions, irreversible retinal damage, rare hematologic reactions, and rare cardiomyopathy.  Patients with G6PD deficiency or hepatic impairment may be at an increased risk for adverse effects.  I encouraged reviewing the package insert and asking any questions about the medication.      Saranya Law, [facility]  Rheumatology         History of Present Illness:   Carolina García presents for evaluation of Sjogren's syndrome, OA, Subcutaneous lupus. Her PMH includes liver cirrhosis of unclear etiology.     Rheumatological history:  Provider(s): Dr. Tony May, Dr. Resendiz, Dr. Townsend  Last office visit: 06/20/2022  Pertinent lab history: Positive THEE, SSA/SSB, Rheumatoid factor, Negative CCP, other THEE subsets, Hep B and C  Previous medications tried: Evoxac (costly), Plaquenil (shortness of breath), Pilocarpine BID (TID causes lightheadedness)  Current medications: Pilocarpine 5mg twice daily    She states that the Pilocarpine doesn't completely helps. She continues to drink a lot of fluids. She sees the eye doctor regularly and she has been  "given eye drops and recently given some samples of \"sniffs\" which helps but, even with GoodRx it is $500. She has also been using over the counter options as well. Her joints are sore at times. She has artificial knees and when the weather changes she will have increased soreness.    No known heart conditions. No retinopathy. She does have the start of cataracts.          Review of Systems:     Constitutional: negative  Skin: negative  Eyes: negative  Ears/Nose/Throat: negative  Respiratory: No shortness of breath, dyspnea on exertion, cough, or hemoptysis  Cardiovascular: negative  Gastrointestinal: negative  Genitourinary: negative  Musculoskeletal: as above  Neurologic: negative  Psychiatric: negative  Hematologic/Lymphatic/Immunologic: negative  Endocrine: negative         Active Problem List:     Patient Active Problem List    Diagnosis Date Noted     Nausea and vomiting 04/29/2021     Priority: Medium     Abnormal liver function tests 04/29/2021     Priority: Medium     Nonspecific abdominal pain 10/09/2020     Priority: Medium     Precordial chest pain      Priority: Medium     Abnormal nuclear stress test 08/15/2019     Priority: Medium     Added automatically from request for surgery [id number]         Localized primary carpometacarpal osteoarthritis 10/24/2018     Priority: Medium     Sjogren's syndrome (H) 08/20/2018     Priority: Medium     Primary osteoarthritis involving multiple joints 08/20/2018     Priority: Medium     THEE positive 08/20/2018     Priority: Medium     Anxiety      Priority: Medium     Created by Conversion  Replacement Utility updated for latest IMO load         Hypertension      Priority: Medium     Created by Conversion  Replacement Utility updated for latest IMO load         Rotator Cuff Tendonitis      Priority: Medium     Created by Conversion  Replacement Utility updated for latest IMO load         Allergic Rhinitis      Priority: Medium     Created by Conversion  Replacement " Utility updated for latest IMO load         Vitamin D Deficiency      Priority: Medium     Created by Conversion  Replacement Utility updated for latest IMO load         Menopause Has Occurred      Priority: Medium     Created by Conversion  Replacement Utility updated for latest IMO load         Hypocomplementemia (H) 01/13/2016     Priority: Medium     Subacute cutaneous lupus erythematosus 10/13/2015     Priority: Medium     Cervical radiculopathy at C5 06/17/2015     Priority: Medium     Myelopathy, spondylogenic, cervical 06/17/2015     Priority: Medium     Lower Back Pain Chronic      Priority: Medium     Created by Conversion         Diverticular disease of colon 09/23/2014     Priority: Medium     Esophageal Reflux      Priority: Medium     Created by Conversion                Past Medical History:     Past Medical History:   Diagnosis Date     GERD (gastroesophageal reflux disease)      Hypertension      Neck pain      Osteoarthritis 1/13/2016     Systemic lupus (H)      Past Surgical History:   Procedure Laterality Date     ARTHROPLASTY KNEE BILATERAL       BIOPSY BREAST Right 2005    benign     CV CORONARY ANGIOGRAM N/A 8/22/2019    Procedure: Coronary Angiogram;  Surgeon: Melissa Vasquez MD;  Location: [facility] Cath Lab;  Service: Cardiology     CV LEFT HEART CATHETERIZATION WITHOUT LEFT VENTRICULOGRAM Left 8/22/2019    Procedure: Left Heart Catheterization Without Left Ventriculogram;  Surgeon: Melissa Vasquez MD;  Location: [facility] Cath Lab;  Service: Cardiology     FISSURECTOMY RECTUM       HC REMOVAL GALLBLADDER      Description: Cholecystectomy;  Recorded: 04/16/2012;     HYSTERECTOMY  1986     RI TOT DISC ARTHRP ART DISC ANT APPRO 1 NTRSPC CRV N/A 6/17/2015    Procedure:  C45 MOBI-C DISC ARTHORPLASTY, ATTEMPT MOBI C C56, ANTERIOR CERVICAL DISCECTOMY AND FUSION IF UNABLE TO PLACE ;  Surgeon: Korina Beltrán MD;  Location: [facility] OR;  Service: Spine     ZZC GASTROPLASTY,OBESITY,OTHER       Description: Gastric Surgery For Morbid Obesity Gastric Stapling;  Recorded: 04/16/2012;            Social History:     Social History     Socioeconomic History     Marital status:      Spouse name: Not on file     Number of children: 3     Years of education: Not on file     Highest education level: Not on file   Occupational History     Not on file   Tobacco Use     Smoking status: Never     Smokeless tobacco: Never   Substance and Sexual Activity     Alcohol use: Yes     Comment: Alcoholic Drinks/day: glass of wine on occasion     Drug use: No     Sexual activity: Yes     Partners: Male   Other Topics Concern     Not on file   Social History Narrative     Not on file     Social Determinants of Health     Financial Resource Strain: Not on file   Food Insecurity: Not on file   Transportation Needs: Not on file   Physical Activity: Not on file   Stress: Not on file   Social Connections: Not on file   Intimate Partner Violence: Not on file   Housing Stability: Not on file          Family History:     Family History   Problem Relation Age of Onset     Breast Cancer Sister 52.00     Cancer Sister      Breast Cancer Sister 60.00            Allergies:     Allergies   Allergen Reactions     Hydroxychloroquine Shortness Of Breath     Codeine Unknown     Emesis, tired     Egg White [Albumin, Egg] Unknown     Dumping syndrome            Medications:     Current Outpatient Medications   Medication Sig Dispense Refill     acetaminophen (TYLENOL) 500 MG tablet [ACETAMINOPHEN (TYLENOL) 500 MG TABLET] Take 500 mg by mouth 2 (two) times a day.       amLODIPine (NORVASC) 5 MG tablet TAKE 1 TABLET BY MOUTH EVERY DAY 90 tablet 3     CARBOXYMETHYLCELL/HYPROMELLOSE (GENTEAL GEL OPHT) [CARBOXYMETHYLCELL/HYPROMELLOSE (GENTEAL GEL OPHT)] Apply 2 drops to eye as needed.       citalopram (CELEXA) 20 MG tablet TAKE 1 & 1/2 TABLETS BY MOUTH ONCE A DAY 45 tablet 3     diclofenac sodium (VOLTAREN) 1 % Gel [DICLOFENAC SODIUM  (VOLTAREN) 1 % GEL] Apply 2 g topically daily as needed.       loratadine (CLARITIN) 10 mg tablet [LORATADINE (CLARITIN) 10 MG TABLET] Take 10 mg by mouth daily.       losartan (COZAAR) 100 MG tablet Take 1 tablet (100 mg) by mouth daily 90 tablet 1     multivitamin therapeutic tablet [MULTIVITAMIN THERAPEUTIC TABLET] Take 1 tablet by mouth daily.       mupirocin (BACTROBAN) 2 % external ointment Apply topically 2 times daily 30 g 0     OMEGA-3/DHA/EPA/FISH OIL (FISH OIL-OMEGA-3 FATTY ACIDS) 300-1,000 mg capsule [OMEGA-3/DHA/EPA/FISH OIL (FISH OIL-OMEGA-3 FATTY ACIDS) 300-1,000 MG CAPSULE] Take 2 g by mouth daily.       omeprazole (PRILOSEC) 20 MG capsule [OMEPRAZOLE (PRILOSEC) 20 MG CAPSULE] Take 2 capsules (40 mg total) by mouth 2 (two) times a day before meals. 120 capsule 1     pilocarpine (SALAGEN) 5 MG tablet Take 1 tablet (5 mg) by mouth 2 times daily as needed 180 tablet 0     polyvinyl alcohol (LIQUIFILM TEARS) 1.4 % ophthalmic solution [POLYVINYL ALCOHOL (LIQUIFILM TEARS) 1.4 % OPHTHALMIC SOLUTION] Administer 1 drop to both eyes as needed for dry eyes.              Physical Exam:   There were no vitals taken for this visit.  Wt Readings from Last 6 Encounters:   06/20/22 76.2 kg (168 lb)   11/15/21 77.2 kg (170 lb 3.2 oz)   08/23/21 78.8 kg (173 lb 11.2 oz)   04/30/21 80.2 kg (176 lb 11.2 oz)   08/10/20 83.8 kg (184 lb 12.8 oz)   02/06/20 88.9 kg (196 lb)     Constitutional: well-developed, appearing stated age; cooperative  Eyes: nl conjunctiva, sclera  ENT: nl external ears, nose, hearing, lips, teeth, gums, throat. No mucositis.   No mucous membrane lesions, normal saliva pool  Neck: no mass or thyroid enlargement  Resp: No shortness of breath with normal conversation  Lymph: no cervical, supraclavicular or epitrochlear nodes  MS: She has Heberden and Dexter's nodes bilaterally.  No active synovitis or altered joint anatomy.  Full fist formation and normal  strength. No dactylitis,  tenosynovitis,  enthesopathy.   Skin: no nail pitting, alopecia, rash, nodules or lesions.   Neuro: nl cranial nerves.   Psych: nl judgement, orientation, memory, affect.           Data:   Imagin2019 CXR  FINDINGS: Negative chest.    Laboratory:  2022  Albumin 3.3  ,   White blood cell count 3.6, hemoglobin 11.2, platelet count 260

## 2022-12-29 NOTE — PATIENT INSTRUCTIONS
After Visit Instructions:     Thank you for coming to [facility] for your care. It is my goal to partner with you to help you reach your optimal state of health.       Plan:     Schedule follow-up with Saranya Law PA-C in 3 months.   Labs: CBC, Creatinine, Albumin, AST, ALT monthly for 3 months  Medication recommendations:   Continue Pilocarpine 5mg twice daily  Start Hydroxychloroquine 200mg: take 1 tablet every other day-you will need a yearly eye exam on this medication  # Hydroxychloroquine (Plaquenil) Risks and Benefits:  The risks and benefits of hydroxychloroquine were discussed in detail and the patient verbalized understanding; the patient also verbalized agreement to get the required ophthalmologic toxicity monitoring.  The risks discussed include, but are not limited to, the risk for hypersensitivity, anaphylaxis, anaphylactoid reactions, irreversible retinal damage, rare hematologic reactions, and rare cardiomyopathy.  Patients with G6PD deficiency or hepatic impairment may be at an increased risk for adverse effects.  I encouraged reviewing the package insert and asking any questions about the medication.          Saranya Law PA-C  [facility]  [facility]    Contact information: [facility]  Clinic Number:  [phone]  Please call or send a datango message with any questions about your care

## 2023-01-02 DIAGNOSIS — M35.01 SJOGREN'S SYNDROME WITH KERATOCONJUNCTIVITIS SICCA (H): ICD-10-CM

## 2023-01-02 RX ORDER — PILOCARPINE HYDROCHLORIDE 5 MG/1
5 TABLET, FILM COATED ORAL 2 TIMES DAILY PRN
Qty: 180 TABLET | Refills: 0 | Status: SHIPPED | OUTPATIENT
Start: 2023-01-02 | End: 2023-03-16

## 2023-01-02 NOTE — TELEPHONE ENCOUNTER
Refill request from [id number]    Medication: Pilcarpine 5mg  Last Refill: 10/7/22  Last OV: 12/29/22  Last lab: 6/2/22  Future OV: 3/27/23

## 2023-01-26 ENCOUNTER — TRANSFERRED RECORDS (OUTPATIENT)
Dept: HEALTH INFORMATION MANAGEMENT | Facility: CLINIC | Age: 78
End: 2023-01-26

## 2023-01-29 ENCOUNTER — HEALTH MAINTENANCE LETTER (OUTPATIENT)
Age: 78
End: 2023-01-29

## 2023-02-07 ENCOUNTER — LAB (OUTPATIENT)
Dept: LAB | Facility: HOSPITAL | Age: 78
End: 2023-02-07
Payer: COMMERCIAL

## 2023-02-07 DIAGNOSIS — M35.01 SJOGREN'S SYNDROME WITH KERATOCONJUNCTIVITIS SICCA (H): ICD-10-CM

## 2023-02-07 DIAGNOSIS — Z79.899 HIGH RISK MEDICATION USE: ICD-10-CM

## 2023-02-07 LAB
ALBUMIN SERPL BCG-MCNC: 3.3 G/DL (ref 3.5–5.2)
ALT SERPL W P-5'-P-CCNC: 75 U/L (ref 10–35)
AST SERPL W P-5'-P-CCNC: 95 U/L (ref 10–35)
CREAT SERPL-MCNC: 0.89 MG/DL (ref 0.51–0.95)
ERYTHROCYTE [DISTWIDTH] IN BLOOD BY AUTOMATED COUNT: 13.2 % (ref 10–15)
GFR SERPL CREATININE-BSD FRML MDRD: 67 ML/MIN/1.73M2
HCT VFR BLD AUTO: 33.6 % (ref 35–47)
HGB BLD-MCNC: 11.1 G/DL (ref 11.7–15.7)
MCH RBC QN AUTO: 30.7 PG (ref 26.5–33)
MCHC RBC AUTO-ENTMCNC: 33 G/DL (ref 31.5–36.5)
MCV RBC AUTO: 93 FL (ref 78–100)
PLATELET # BLD AUTO: 149 10E3/UL (ref 150–450)
RBC # BLD AUTO: 3.62 10E6/UL (ref 3.8–5.2)
WBC # BLD AUTO: 3.6 10E3/UL (ref 4–11)

## 2023-02-07 PROCEDURE — 82565 ASSAY OF CREATININE: CPT

## 2023-02-07 PROCEDURE — 84460 ALANINE AMINO (ALT) (SGPT): CPT

## 2023-02-07 PROCEDURE — 82040 ASSAY OF SERUM ALBUMIN: CPT

## 2023-02-07 PROCEDURE — 36415 COLL VENOUS BLD VENIPUNCTURE: CPT

## 2023-02-07 PROCEDURE — 84450 TRANSFERASE (AST) (SGOT): CPT

## 2023-02-07 PROCEDURE — 85027 COMPLETE CBC AUTOMATED: CPT

## 2023-02-28 DIAGNOSIS — F41.1 ANXIETY STATE: ICD-10-CM

## 2023-03-01 RX ORDER — CITALOPRAM HYDROBROMIDE 20 MG/1
TABLET ORAL
Qty: 135 TABLET | Refills: 1 | Status: SHIPPED | OUTPATIENT
Start: 2023-03-01 | End: 2023-08-07

## 2023-03-01 NOTE — TELEPHONE ENCOUNTER
"Routing refill request to provider for review/approval because:  Patient needs to be seen because it has been more than 1 year since last office visit. 11/15/2021    Last Written Prescription Date:  11/29/2022  Last Fill Quantity: 45,  # refills: 3   Last office visit provider:  11/15/21     Requested Prescriptions   Pending Prescriptions Disp Refills     citalopram (CELEXA) 20 MG tablet [Pharmacy Med Name: CITALOPRAM HBR 20 MG TABLET] 135 tablet 1     Sig: TAKE 1 & 1/2 TABLETS BY MOUTH ONCE A DAY       SSRIs Protocol Failed - 2/28/2023 12:27 AM        Failed - Recent (12 mo) or future (30 days) visit within the authorizing provider's specialty     Patient has had an office visit with the authorizing provider or a provider within the authorizing providers department within the previous 12 mos or has a future within next 30 days. See \"Patient Info\" tab in inbasket, or \"Choose Columns\" in Meds & Orders section of the refill encounter.              Passed - Medication is active on med list        Passed - Patient is age 18 or older        Passed - No active pregnancy on record        Passed - No positive pregnancy test in last 12 months             Zaida Lawrence RN 02/28/23 9:08 PM  "

## 2023-03-16 DIAGNOSIS — M35.01 SJOGREN'S SYNDROME WITH KERATOCONJUNCTIVITIS SICCA (H): ICD-10-CM

## 2023-03-16 RX ORDER — PILOCARPINE HYDROCHLORIDE 5 MG/1
5 TABLET, FILM COATED ORAL 2 TIMES DAILY PRN
Qty: 180 TABLET | Refills: 0 | Status: SHIPPED | OUTPATIENT
Start: 2023-03-16 | End: 2023-06-07

## 2023-03-16 NOTE — TELEPHONE ENCOUNTER
Chief Complaint   Patient presents with     Refill Request     pilocarpine      Refill request received via fax by pharmacy   [id number] - [address]       Pending Prescriptions:                       Disp   Refills    pilocarpine (SALAGEN) 5 MG tablet         180 ta*0            Sig: Take 1 tablet (5 mg) by mouth 2 times daily as           needed         Last Written Prescription Date:  1/2/23  Last Fill Quantity: 180,   # refills: 0  Last Office Visit with prescribing provider: 12/29/2022  Future Office visit:    Next 5 appointments (look out 90 days)    Mar 27, 2023 12:00 PM  (Arrive by 11:45 AM)  Return Visit with Saranya Law PA-C  [facility] ([facility] ) [address] [place] [id number] [phone]

## 2023-03-20 ENCOUNTER — MEDICAL CORRESPONDENCE (OUTPATIENT)
Dept: HEALTH INFORMATION MANAGEMENT | Facility: CLINIC | Age: 78
End: 2023-03-20

## 2023-03-26 DIAGNOSIS — I10 ESSENTIAL HYPERTENSION: ICD-10-CM

## 2023-03-26 RX ORDER — AMLODIPINE BESYLATE 5 MG/1
TABLET ORAL
Qty: 90 TABLET | Refills: 3 | Status: SHIPPED | OUTPATIENT
Start: 2023-03-26 | End: 2024-03-29

## 2023-03-26 NOTE — TELEPHONE ENCOUNTER
"Routing refill request to provider for review/approval because:  Patient needs to be seen because it has been more than 1 year since last office visit.    Last Written Prescription Date:  2/16/2022  Last Fill Quantity: 90,  # refills: 3   Last office visit provider:  11/15/2021     Requested Prescriptions   Pending Prescriptions Disp Refills     amLODIPine (NORVASC) 5 MG tablet [Pharmacy Med Name: AMLODIPINE BESYLATE 5 MG TAB] 90 tablet 3     Sig: TAKE 1 TABLET BY MOUTH EVERY DAY       Calcium Channel Blockers Protocol  Failed - 3/26/2023  1:55 PM        Failed - Recent (12 mo) or future (30 days) visit within the authorizing provider's specialty     Patient has had an office visit with the authorizing provider or a provider within the authorizing providers department within the previous 12 mos or has a future within next 30 days. See \"Patient Info\" tab in inbasket, or \"Choose Columns\" in Meds & Orders section of the refill encounter.              Passed - Blood pressure under 140/90 in past 12 months     BP Readings from Last 3 Encounters:   12/29/22 130/60   06/20/22 108/60   11/15/21 116/64                 Passed - Medication is active on med list        Passed - Patient is age 18 or older        Passed - No active pregnancy on record        Passed - Normal serum creatinine on file in past 12 months     Recent Labs   Lab Test 02/07/23  1122   CR 0.89       Ok to refill medication if creatinine is low          Passed - No positive pregnancy test in past 12 months             Adrianna Thompson RN 03/26/23 1:55 PM  "

## 2023-03-27 ENCOUNTER — OFFICE VISIT (OUTPATIENT)
Dept: RHEUMATOLOGY | Facility: CLINIC | Age: 78
End: 2023-03-27
Payer: COMMERCIAL

## 2023-03-27 ENCOUNTER — HOSPITAL ENCOUNTER (OUTPATIENT)
Dept: GENERAL RADIOLOGY | Facility: HOSPITAL | Age: 78
Discharge: HOME | End: 2023-03-27
Attending: PHYSICIAN ASSISTANT | Admitting: PHYSICIAN ASSISTANT
Payer: COMMERCIAL

## 2023-03-27 VITALS
HEART RATE: 89 BPM | DIASTOLIC BLOOD PRESSURE: 58 MMHG | SYSTOLIC BLOOD PRESSURE: 120 MMHG | OXYGEN SATURATION: 96 % | BODY MASS INDEX: 24.59 KG/M2 | WEIGHT: 157 LBS

## 2023-03-27 DIAGNOSIS — M35.01 SJOGREN'S SYNDROME WITH KERATOCONJUNCTIVITIS SICCA (H): ICD-10-CM

## 2023-03-27 DIAGNOSIS — M35.01 SJOGREN'S SYNDROME WITH KERATOCONJUNCTIVITIS SICCA (H): Primary | ICD-10-CM

## 2023-03-27 DIAGNOSIS — R05.9 COUGH, UNSPECIFIED TYPE: ICD-10-CM

## 2023-03-27 PROCEDURE — 99213 OFFICE O/P EST LOW 20 MIN: CPT | Performed by: PHYSICIAN ASSISTANT

## 2023-03-27 PROCEDURE — 71046 X-RAY EXAM CHEST 2 VIEWS: CPT

## 2023-03-27 NOTE — PATIENT INSTRUCTIONS
After Visit Instructions:     Thank you for coming to [facility] for your care. It is my goal to partner with you to help you reach your optimal state of health.       Plan:     Schedule follow-up with Saranya Law PA-C in 6 months.   Imaging: chest xray  Medication recommendations:   Continue Pilocarpine 5mg twice daily  Look up the Sjogren's foundation website        Saranya Law PA-C  [facility]  [facility]    Contact information: [facility]  Clinic Number:  [phone]  Please call or send a Sernova message with any questions about your care

## 2023-03-27 NOTE — PROGRESS NOTES
Rheumatology Clinic Visit  [facility]  PATRIZIA Wagner     Carolina García MRN# [id number]   YOB: 1945 Age: 78 year old   Date of Visit: 03/27/2023  Primary care provider: Rich Kelly          Assessment and Plan:     1.  Sjogren's syndrome with keratoconjunctivitis sicca  2.  Cough    Patient presents today for follow up for Sjogren's syndrome. She stopped the Hydroxychloroquine due to significant fatigue and stomach upset. These resolved after discontinuation. Her dry eyes and dry mouth continue. She has started to notice a light cough and is wondering if this is related to her dry mouth. Will get a chest xray today. If the cough worsens, may need to consider CT. She will continue Pilocarpine for dry mouth. Also recommend that she look up the Sjogren's foundation for other potential recommendations.       Plan:     1. Schedule follow-up with Saranya Law PA-C in 6 months.   2. Imaging: chest xray  3. Medication recommendations:   a. Continue Pilocarpine 5mg twice daily  b. Look up the Sjogren's foundation website      PATRIZIA Wagner  Rheumatology         History of Present Illness:   Carolina García presents for evaluation of Sjogren's syndrome, OA, Subcutaneous lupus. Her PMH includes liver cirrhosis of unclear etiology.     Rheumatological history:  Provider(s): Dr. Tony May, Dr. Resendiz, Dr. Townsend  Last office visit: 06/20/2022  Pertinent lab history: Positive THEE, SSA/SSB, Rheumatoid factor, Negative CCP, other THEE subsets, Hep B and C  Previous medications tried: Evoxac (costly), Plaquenil (shortness of breath), Pilocarpine BID (TID causes lightheadedness)  Current medications: Pilocarpine 5mg twice daily    Interval history March 27, 2023:  She stopped the hydroxychloroquine, she became significantly fatigued and stomach upset. She stopped it after 2 weeks. These stopped with discontinuing the medication.    She continues with significant dry eyes. She has  "also developed a cough. She does continue on the Pilocarpine for her dry mouth. She has some days where it does not help.She uses a lot of Biotene. She tried the lozenges, but it made her mouth feel very sore. This can also be dependent on what she eats. She states that her joints are doing good.       HPI from Consult:  She states that the Pilocarpine doesn't completely helps. She continues to drink a lot of fluids. She sees the eye doctor regularly and she has been given eye drops and recently given some samples of \"sniffs\" which helps but, even with GoodRx it is $500. She has also been using over the counter options as well. Her joints are sore at times. She has artificial knees and when the weather changes she will have increased soreness.    No known heart conditions. No retinopathy. She does have the start of cataracts.          Review of Systems:     Constitutional: negative  Skin: negative  Eyes: negative  Ears/Nose/Throat: negative  Respiratory: No shortness of breath, dyspnea on exertion, cough, or hemoptysis  Cardiovascular: negative  Gastrointestinal: negative  Genitourinary: negative  Musculoskeletal: as above  Neurologic: negative  Psychiatric: negative  Hematologic/Lymphatic/Immunologic: negative  Endocrine: negative         Active Problem List:     Patient Active Problem List    Diagnosis Date Noted     Nausea and vomiting 04/29/2021     Priority: Medium     Abnormal liver function tests 04/29/2021     Priority: Medium     Nonspecific abdominal pain 10/09/2020     Priority: Medium     Precordial chest pain      Priority: Medium     Abnormal nuclear stress test 08/15/2019     Priority: Medium     Added automatically from request for surgery [id number]         Localized primary carpometacarpal osteoarthritis 10/24/2018     Priority: Medium     Sjogren's syndrome (H) 08/20/2018     Priority: Medium     Primary osteoarthritis involving multiple joints 08/20/2018     Priority: Medium     THEE positive " 08/20/2018     Priority: Medium     Anxiety      Priority: Medium     Created by Conversion  Replacement Utility updated for latest IMO load         Hypertension      Priority: Medium     Created by Conversion  Replacement Utility updated for latest IMO load         Rotator Cuff Tendonitis      Priority: Medium     Created by Conversion  Replacement Utility updated for latest IMO load         Allergic Rhinitis      Priority: Medium     Created by Conversion  Replacement Utility updated for latest IMO load         Vitamin D Deficiency      Priority: Medium     Created by Conversion  Replacement Utility updated for latest IMO load         Menopause Has Occurred      Priority: Medium     Created by Conversion  Replacement Utility updated for latest IMO load         Hypocomplementemia (H) 01/13/2016     Priority: Medium     Subacute cutaneous lupus erythematosus 10/13/2015     Priority: Medium     Cervical radiculopathy at C5 06/17/2015     Priority: Medium     Myelopathy, spondylogenic, cervical 06/17/2015     Priority: Medium     Lower Back Pain Chronic      Priority: Medium     Created by Conversion         Diverticular disease of colon 09/23/2014     Priority: Medium     Esophageal Reflux      Priority: Medium     Created by Conversion                Past Medical History:     Past Medical History:   Diagnosis Date     GERD (gastroesophageal reflux disease)      Hypertension      Neck pain      Osteoarthritis 1/13/2016     Systemic lupus (H)      Past Surgical History:   Procedure Laterality Date     ARTHROPLASTY KNEE BILATERAL       BIOPSY BREAST Right 2005    benign     CV CORONARY ANGIOGRAM N/A 8/22/2019    Procedure: Coronary Angiogram;  Surgeon: Melissa Vasquez MD;  Location: [facility] Cath Lab;  Service: Cardiology     CV LEFT HEART CATHETERIZATION WITHOUT LEFT VENTRICULOGRAM Left 8/22/2019    Procedure: Left Heart Catheterization Without Left Ventriculogram;  Surgeon: Melissa Vasquez MD;  Location:   [facility] Cath Lab;  Service: Cardiology     FISSURECTOMY RECTUM       HC REMOVAL GALLBLADDER      Description: Cholecystectomy;  Recorded: 04/16/2012;     HYSTERECTOMY  1986     MI TOT DISC ARTHRP ART DISC ANT APPRO 1 NTRSPC CRV N/A 6/17/2015    Procedure:  C45 MOBI-C DISC ARTHORPLASTY, ATTEMPT MOBI C C56, ANTERIOR CERVICAL DISCECTOMY AND FUSION IF UNABLE TO PLACE ;  Surgeon: Korina Beltrán MD;  Location: [facility] OR;  Service: Spine     [facility] GASTROPLASTY,OBESITY,OTHER      Description: Gastric Surgery For Morbid Obesity Gastric Stapling;  Recorded: 04/16/2012;            Social History:     Social History     Socioeconomic History     Marital status:      Spouse name: Not on file     Number of children: 3     Years of education: Not on file     Highest education level: Not on file   Occupational History     Not on file   Tobacco Use     Smoking status: Never     Smokeless tobacco: Never   Substance and Sexual Activity     Alcohol use: Yes     Comment: Alcoholic Drinks/day: glass of wine on occasion     Drug use: No     Sexual activity: Yes     Partners: Male   Other Topics Concern     Not on file   Social History Narrative     Not on file     Social Determinants of Health     Financial Resource Strain: Not on file   Food Insecurity: Not on file   Transportation Needs: Not on file   Physical Activity: Not on file   Stress: Not on file   Social Connections: Not on file   Intimate Partner Violence: Not on file   Housing Stability: Not on file          Family History:     Family History   Problem Relation Age of Onset     Breast Cancer Sister 52.00     Cancer Sister      Breast Cancer Sister 60.00            Allergies:     Allergies   Allergen Reactions     Hydroxychloroquine Shortness Of Breath     Codeine Unknown     Emesis, tired     Egg White [Albumin, Egg] Unknown     Dumping syndrome            Medications:     Current Outpatient Medications   Medication Sig Dispense Refill     acetaminophen  (TYLENOL) 500 MG tablet [ACETAMINOPHEN (TYLENOL) 500 MG TABLET] Take 500 mg by mouth 2 (two) times a day.       amLODIPine (NORVASC) 5 MG tablet TAKE 1 TABLET BY MOUTH EVERY DAY 90 tablet 3     CARBOXYMETHYLCELL/HYPROMELLOSE (GENTEAL GEL OPHT) [CARBOXYMETHYLCELL/HYPROMELLOSE (GENTEAL GEL OPHT)] Apply 2 drops to eye as needed.       citalopram (CELEXA) 20 MG tablet TAKE 1 & 1/2 TABLETS BY MOUTH ONCE A  tablet 1     diclofenac sodium (VOLTAREN) 1 % Gel [DICLOFENAC SODIUM (VOLTAREN) 1 % GEL] Apply 2 g topically daily as needed.       hydroxychloroquine (PLAQUENIL) 200 MG tablet Take 1 tablet (200 mg) by mouth every other day Annual Plaquenil toxicity eye screening required. 15 tablet 2     loratadine (CLARITIN) 10 mg tablet [LORATADINE (CLARITIN) 10 MG TABLET] Take 10 mg by mouth daily.       losartan (COZAAR) 100 MG tablet TAKE 1 TABLET BY MOUTH EVERY DAY 90 tablet 3     multivitamin therapeutic tablet [MULTIVITAMIN THERAPEUTIC TABLET] Take 1 tablet by mouth daily.       mupirocin (BACTROBAN) 2 % external ointment Apply topically 2 times daily 30 g 0     OMEGA-3/DHA/EPA/FISH OIL (FISH OIL-OMEGA-3 FATTY ACIDS) 300-1,000 mg capsule [OMEGA-3/DHA/EPA/FISH OIL (FISH OIL-OMEGA-3 FATTY ACIDS) 300-1,000 MG CAPSULE] Take 2 g by mouth daily.       omeprazole (PRILOSEC) 20 MG capsule [OMEPRAZOLE (PRILOSEC) 20 MG CAPSULE] Take 2 capsules (40 mg total) by mouth 2 (two) times a day before meals. 120 capsule 1     pilocarpine (SALAGEN) 5 MG tablet Take 1 tablet (5 mg) by mouth 2 times daily as needed 180 tablet 0     polyvinyl alcohol (LIQUIFILM TEARS) 1.4 % ophthalmic solution [POLYVINYL ALCOHOL (LIQUIFILM TEARS) 1.4 % OPHTHALMIC SOLUTION] Administer 1 drop to both eyes as needed for dry eyes.              Physical Exam:   There were no vitals taken for this visit.  Wt Readings from Last 6 Encounters:   12/29/22 70.3 kg (155 lb)   06/20/22 76.2 kg (168 lb)   11/15/21 77.2 kg (170 lb 3.2 oz)   08/23/21 78.8 kg (173 lb 11.2  oz)   21 80.2 kg (176 lb 11.2 oz)   08/10/20 83.8 kg (184 lb 12.8 oz)     Constitutional: well-developed, appearing stated age; cooperative  Eyes: nl conjunctiva, sclera  ENT: nl external ears, nose, hearing, lips,  Neck: no visible mass or thyroid enlargement  Resp: No shortness of breath with normal conversation  Psych: nl judgement, orientation, memory, affect.           Data:   Imagin2019 CXR  FINDINGS: Negative chest.    Laboratory:  2022  Albumin 3.3  ,   White blood cell count 3.6, hemoglobin 11.2, platelet count 260    2023  Creatinine 0.89, GFR 67  Albumin 3.3  ALT 75, AST 95  White blood cell count 3.6, hemoglobin 11.2, platelet count 149

## 2023-03-29 ENCOUNTER — TRANSFERRED RECORDS (OUTPATIENT)
Dept: HEALTH INFORMATION MANAGEMENT | Facility: CLINIC | Age: 78
End: 2023-03-29

## 2023-05-24 ENCOUNTER — LAB (OUTPATIENT)
Dept: LAB | Facility: HOSPITAL | Age: 78
End: 2023-05-24
Attending: NURSE PRACTITIONER
Payer: COMMERCIAL

## 2023-05-24 ENCOUNTER — HOSPITAL ENCOUNTER (OUTPATIENT)
Dept: ULTRASOUND IMAGING | Facility: HOSPITAL | Age: 78
Discharge: HOME | End: 2023-05-24
Attending: NURSE PRACTITIONER
Payer: COMMERCIAL

## 2023-05-24 DIAGNOSIS — K74.60 LIVER CIRRHOSIS SECONDARY TO NONALCOHOLIC STEATOHEPATITIS (NASH) (H): ICD-10-CM

## 2023-05-24 DIAGNOSIS — K75.81 LIVER CIRRHOSIS SECONDARY TO NONALCOHOLIC STEATOHEPATITIS (NASH) (H): ICD-10-CM

## 2023-05-24 DIAGNOSIS — K75.81 LIVER CIRRHOSIS SECONDARY TO NONALCOHOLIC STEATOHEPATITIS (NASH) (H): Primary | ICD-10-CM

## 2023-05-24 DIAGNOSIS — K74.60 LIVER CIRRHOSIS SECONDARY TO NONALCOHOLIC STEATOHEPATITIS (NASH) (H): Primary | ICD-10-CM

## 2023-05-24 LAB
AFP SERPL-MCNC: 2.5 NG/ML
ALBUMIN SERPL BCG-MCNC: 3 G/DL (ref 3.5–5.2)
ALP SERPL-CCNC: 103 U/L (ref 35–104)
ALT SERPL W P-5'-P-CCNC: 74 U/L (ref 10–35)
AST SERPL W P-5'-P-CCNC: 125 U/L (ref 10–35)
BILIRUB DIRECT SERPL-MCNC: 0.33 MG/DL (ref 0–0.3)
BILIRUB SERPL-MCNC: 1 MG/DL
PROT SERPL-MCNC: 7.7 G/DL (ref 6.4–8.3)

## 2023-05-24 PROCEDURE — 76705 ECHO EXAM OF ABDOMEN: CPT

## 2023-05-24 PROCEDURE — 80076 HEPATIC FUNCTION PANEL: CPT

## 2023-05-24 PROCEDURE — 36415 COLL VENOUS BLD VENIPUNCTURE: CPT

## 2023-05-24 PROCEDURE — 82105 ALPHA-FETOPROTEIN SERUM: CPT

## 2023-05-25 ENCOUNTER — TRANSFERRED RECORDS (OUTPATIENT)
Dept: HEALTH INFORMATION MANAGEMENT | Facility: CLINIC | Age: 78
End: 2023-05-25
Payer: COMMERCIAL

## 2023-06-07 DIAGNOSIS — M35.01 SJOGREN'S SYNDROME WITH KERATOCONJUNCTIVITIS SICCA (H): ICD-10-CM

## 2023-06-07 RX ORDER — PILOCARPINE HYDROCHLORIDE 5 MG/1
5 TABLET, FILM COATED ORAL 2 TIMES DAILY PRN
Qty: 180 TABLET | Refills: 0 | Status: SHIPPED | OUTPATIENT
Start: 2023-06-07 | End: 2023-08-07

## 2023-06-07 NOTE — TELEPHONE ENCOUNTER
LF: 3/16/23  Qty: 180  Last Rheumatology Visit: 3/27/23   Next Scheduled Rheumatology Visit: 9/25/23

## 2023-07-12 ENCOUNTER — OFFICE VISIT (OUTPATIENT)
Dept: PHYSICAL MEDICINE AND REHAB | Facility: CLINIC | Age: 78
End: 2023-07-12
Payer: COMMERCIAL

## 2023-07-12 VITALS
SYSTOLIC BLOOD PRESSURE: 134 MMHG | DIASTOLIC BLOOD PRESSURE: 67 MMHG | HEART RATE: 109 BPM | WEIGHT: 153 LBS | BODY MASS INDEX: 23.96 KG/M2

## 2023-07-12 DIAGNOSIS — M79.18 MYOFASCIAL PAIN: ICD-10-CM

## 2023-07-12 DIAGNOSIS — Z98.890 H/O CERVICAL SPINE SURGERY: ICD-10-CM

## 2023-07-12 DIAGNOSIS — M54.2 CERVICAL SPINE PAIN: Primary | ICD-10-CM

## 2023-07-12 DIAGNOSIS — M47.812 ARTHROPATHY OF CERVICAL FACET JOINT: ICD-10-CM

## 2023-07-12 PROCEDURE — 99204 OFFICE O/P NEW MOD 45 MIN: CPT | Performed by: PHYSICAL MEDICINE & REHABILITATION

## 2023-07-12 ASSESSMENT — PAIN SCALES - GENERAL: PAINLEVEL: MODERATE PAIN (5)

## 2023-07-12 NOTE — PATIENT INSTRUCTIONS
An MRI an xray were ordered for you today.  You will be contacted by scheduling within 3 days.    If you are not contacted, please call Radiology at [phone].    A physical therapy order was provided for you today.  You will be contacted by physical therapy.  If nobody contacts you within 3 to 5 days, please contact the clinic at [phone].  It will be very important for you to do your physical therapy exercises on a regular basis to decrease your pain and prevent future pain flares.    Sign a release from [place] to get the MRI images   16
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
## 2023-07-14 ENCOUNTER — THERAPY VISIT (OUTPATIENT)
Dept: PHYSICAL THERAPY | Facility: REHABILITATION | Age: 78
End: 2023-07-14
Attending: PHYSICAL MEDICINE & REHABILITATION
Payer: COMMERCIAL

## 2023-07-14 DIAGNOSIS — Z98.890 H/O CERVICAL SPINE SURGERY: ICD-10-CM

## 2023-07-14 DIAGNOSIS — M54.2 CERVICAL SPINE PAIN: ICD-10-CM

## 2023-07-14 DIAGNOSIS — M79.18 MYOFASCIAL PAIN: ICD-10-CM

## 2023-07-14 DIAGNOSIS — M47.812 ARTHROPATHY OF CERVICAL FACET JOINT: ICD-10-CM

## 2023-07-14 PROCEDURE — 97140 MANUAL THERAPY 1/> REGIONS: CPT | Mod: GP

## 2023-07-14 PROCEDURE — 97161 PT EVAL LOW COMPLEX 20 MIN: CPT | Mod: GP

## 2023-07-14 PROCEDURE — 97112 NEUROMUSCULAR REEDUCATION: CPT | Mod: GP

## 2023-07-14 NOTE — PROGRESS NOTES
PHYSICAL THERAPY EVALUATION  Type of Visit: Evaluation    See electronic medical record for Abuse and Falls Screening details.    Subjective      Presenting condition or subjective complaint: neck  Date of onset: 07/12/23    Relevant medical history: High blood pressure; Neck injury; Osteoporosis   Dates & types of surgery: knees replaced    Prior diagnostic imaging/testing results:       Prior therapy history for the same diagnosis, illness or injury: Yes      Prior Level of Function   Transfers:   Ambulation:   ADL:   IADL:     Living Environment  Social support: With a significant other or spouse   Type of home: House   Stairs to enter the home: No   Is there a railing: No   Ramp: No   Stairs inside the home: No   Is there a railing: No   Help at home: None  Equipment owned:       Employment: No    Hobbies/Interests:      Patient goals for therapy: move my neck    Pain assessment:      Objective   CERVICAL SPINE EVALUATION  PAIN: Pain Level at Rest: 5/10  Pain is Worst: 9/10  INTEGUMENTARY (edema, incisions):   POSTURE: forward head posture, increased tone of upper traps   GAIT:   Weightbearing Status:   Assistive Device(s):   Gait Deviations:   BALANCE/PROPRIOCEPTION:   WEIGHTBEARING ALIGNMENT:   ROM:   (Degrees) Left AROM Right AROM    Cervical Flexion Min loss     Cervical Extension Major loss     Cervical Side bend Major loss  Major loss     Cervical Rotation Major loss  Major loss     Cervical Protrusion     Cervical Retraction     Thoracic Flexion Mod loss     Thoracic Extension Mod loss     Thoracic Rotation Mod loss  Mod loss      Left AROM Left PROM Right AROM Right PROM   Shoulder Flexion       Shoulder Extension       Shoulder Abduction       Shoulder Adduction       Shoulder IR       Shoulder ER       Shoulder Horiz Abduction       Shoulder Horiz Adduction       Pain:   End Feel:      STRENGTH:   Pain: - none + mild ++ moderate +++ severe  Strength Scale: 0-5/5 Left Right   Shoulder Flexion 4 4-    Shoulder Extension 4- 4-   Shoulder Abduction 4 4   Shoulder Adduction     Shoulder Internal Rotation 4 4-   Shoulder External Rotation 4 4-   Shoulder Horizontal Abduction     Shoulder Horizontal Adduction     Elbow Flexion 4 4-   Elbow Extension 4- 4-   Mid Trap     Lower Trap     Rhomboid     Serratus Anterior       MYOTOMES:   DTR S:   CORD SIGNS: Imp  DERMATOMES:   NEURAL TENSION:   FLEXIBILITY:    SPECIAL TESTS:    Left Right   Alar Ligament    Cervical Flexion-Rotation     Cervical Rot/Lateral Flex     Compression     Distraction     Spurling s Negative  Negative    Thoracic Outlet Screen (Sally, Adson)     Transverse Ligament     Vertebral Artery     Cotton Roll Test     Craniocervical Flexor Endurance Test     Mannheimer Test            PALPATION: TTP bilateral paraspinals   SPINAL SEGMENTAL CONCLUSIONS:       Assessment & Plan   CLINICAL IMPRESSIONS   Medical Diagnosis: M54.2 (ICD-10-CM) - Cervical spine pain  M79.18 (ICD-10-CM) - Myofascial pain  M47.812 (ICD-10-CM) - Arthropathy of cervical facet joint  Z98.890 (ICD-10-CM) - H/O cervical spine surgery    Treatment Diagnosis:     Impression/Assessment: Patient is a 78 year old female with neck pain complaints.  The following significant findings have been identified: Pain, Decreased ROM/flexibility, Decreased joint mobility and Decreased strength. These impairments interfere with their ability to perform self care tasks, work tasks, recreational activities, household chores and driving  as compared to previous level of function.     Clinical Decision Making (Complexity):   Clinical Presentation: Stable/Uncomplicated  Clinical Presentation Rationale: based on medical and personal factors listed in PT evaluation  Clinical Decision Making (Complexity): Low complexity    PLAN OF CARE  Treatment Interventions:  Interventions: Manual Therapy, Neuromuscular Re-education, Therapeutic Activity, Therapeutic Exercise    Long Term Goals     PT Goal 1  Goal  Identifier: 1  Goal Description: Patient will be independent with HEP in order to demonstrate  Target Date: 08/11/23  PT Goal 2  Goal Identifier: 2  Target Date: 08/25/23  PT Goal 3  Goal Identifier: 3  Target Date: 09/08/23  PT Goal 4  Goal Identifier: 4  Target Date: 10/06/23      Frequency of Treatment: 1-2x/wk  Duration of Treatment: 12 weeks    Recommended Referrals to Other Professionals:   Education Assessment:        Risks and benefits of evaluation/treatment have been explained.   Patient/Family/caregiver agrees with Plan of Care.     Evaluation Time:            Signing Clinician: Casey Webb, PT

## 2023-07-18 ENCOUNTER — TRANSFERRED RECORDS (OUTPATIENT)
Dept: HEALTH INFORMATION MANAGEMENT | Facility: CLINIC | Age: 78
End: 2023-07-18
Payer: COMMERCIAL

## 2023-07-21 ENCOUNTER — HOSPITAL ENCOUNTER (OUTPATIENT)
Dept: GENERAL RADIOLOGY | Facility: HOSPITAL | Age: 78
Discharge: HOME | End: 2023-07-21
Attending: PHYSICAL MEDICINE & REHABILITATION | Admitting: PHYSICAL MEDICINE & REHABILITATION
Payer: COMMERCIAL

## 2023-07-21 ENCOUNTER — HOSPITAL ENCOUNTER (OUTPATIENT)
Dept: MRI IMAGING | Facility: HOSPITAL | Age: 78
Discharge: HOME | End: 2023-07-21
Attending: PHYSICAL MEDICINE & REHABILITATION | Admitting: PHYSICAL MEDICINE & REHABILITATION
Payer: COMMERCIAL

## 2023-07-21 DIAGNOSIS — M47.812 ARTHROPATHY OF CERVICAL FACET JOINT: ICD-10-CM

## 2023-07-21 DIAGNOSIS — Z98.890 H/O CERVICAL SPINE SURGERY: ICD-10-CM

## 2023-07-21 DIAGNOSIS — M54.2 CERVICAL SPINE PAIN: ICD-10-CM

## 2023-07-21 PROCEDURE — 72050 X-RAY EXAM NECK SPINE 4/5VWS: CPT

## 2023-07-21 PROCEDURE — 72141 MRI NECK SPINE W/O DYE: CPT

## 2023-08-02 ENCOUNTER — THERAPY VISIT (OUTPATIENT)
Dept: PHYSICAL THERAPY | Facility: REHABILITATION | Age: 78
End: 2023-08-02
Payer: COMMERCIAL

## 2023-08-02 DIAGNOSIS — M54.2 CERVICAL SPINE PAIN: Primary | ICD-10-CM

## 2023-08-02 DIAGNOSIS — M79.18 MYOFASCIAL PAIN: ICD-10-CM

## 2023-08-02 DIAGNOSIS — M47.812 ARTHROPATHY OF CERVICAL FACET JOINT: ICD-10-CM

## 2023-08-02 PROCEDURE — 97140 MANUAL THERAPY 1/> REGIONS: CPT | Mod: GP

## 2023-08-02 PROCEDURE — 97110 THERAPEUTIC EXERCISES: CPT | Mod: GP

## 2023-08-07 DIAGNOSIS — M35.01 SJOGREN'S SYNDROME WITH KERATOCONJUNCTIVITIS SICCA (H): ICD-10-CM

## 2023-08-07 DIAGNOSIS — F41.1 ANXIETY STATE: ICD-10-CM

## 2023-08-07 RX ORDER — PILOCARPINE HYDROCHLORIDE 5 MG/1
5 TABLET, FILM COATED ORAL 2 TIMES DAILY PRN
Qty: 180 TABLET | Refills: 0 | Status: SHIPPED | OUTPATIENT
Start: 2023-08-07 | End: 2023-11-29

## 2023-08-07 RX ORDER — CITALOPRAM HYDROBROMIDE 20 MG/1
TABLET ORAL
Qty: 135 TABLET | Refills: 1 | Status: SHIPPED | OUTPATIENT
Start: 2023-08-07 | End: 2024-02-05

## 2023-08-07 RX ORDER — PILOCARPINE HYDROCHLORIDE 5 MG/1
5 TABLET, FILM COATED ORAL 2 TIMES DAILY PRN
Qty: 180 TABLET | Refills: 0 | Status: CANCELLED | OUTPATIENT
Start: 2023-08-07

## 2023-08-07 NOTE — TELEPHONE ENCOUNTER
LF: 6/7/23  Qty: 180  Last Rheumatology Visit: 3/27/23   Next Scheduled Rheumatology Visit: none scheduled

## 2023-08-07 NOTE — TELEPHONE ENCOUNTER
"Routing refill request to provider for review/approval because:  Patient needs to be seen because it has been more than 1 year since last office visit.    Last Written Prescription Date:  3/1/2023  Last Fill Quantity: 135 tablets,  # refills: 1   Last office visit provider:  11/15/2021     Requested Prescriptions   Pending Prescriptions Disp Refills    citalopram (CELEXA) 20 MG tablet [Pharmacy Med Name: CITALOPRAM HBR 20 MG TABLET] 135 tablet 1     Sig: TAKE 1 & 1/2 TABLETS BY MOUTH ONCE A DAY       SSRIs Protocol Failed - 8/7/2023  3:56 PM        Failed - Recent (12 mo) or future (30 days) visit within the authorizing provider's specialty     Patient has had an office visit with the authorizing provider or a provider within the authorizing providers department within the previous 12 mos or has a future within next 30 days. See \"Patient Info\" tab in inbasket, or \"Choose Columns\" in Meds & Orders section of the refill encounter.              Passed - Medication is active on med list        Passed - Patient is age 18 or older        Passed - No active pregnancy on record        Passed - No positive pregnancy test in last 12 months             Freddy Patton RN 08/07/23 3:56 PM  "

## 2023-08-16 ENCOUNTER — THERAPY VISIT (OUTPATIENT)
Dept: PHYSICAL THERAPY | Facility: REHABILITATION | Age: 78
End: 2023-08-16
Payer: COMMERCIAL

## 2023-08-16 DIAGNOSIS — M47.812 ARTHROPATHY OF CERVICAL FACET JOINT: ICD-10-CM

## 2023-08-16 DIAGNOSIS — M54.2 CERVICAL SPINE PAIN: Primary | ICD-10-CM

## 2023-08-16 DIAGNOSIS — M79.18 MYOFASCIAL PAIN: ICD-10-CM

## 2023-08-16 PROCEDURE — 97140 MANUAL THERAPY 1/> REGIONS: CPT | Mod: GP

## 2023-08-16 PROCEDURE — 97110 THERAPEUTIC EXERCISES: CPT | Mod: GP

## 2023-08-18 ENCOUNTER — OFFICE VISIT (OUTPATIENT)
Dept: PHYSICAL MEDICINE AND REHAB | Facility: CLINIC | Age: 78
End: 2023-08-18
Payer: COMMERCIAL

## 2023-08-18 VITALS — DIASTOLIC BLOOD PRESSURE: 60 MMHG | SYSTOLIC BLOOD PRESSURE: 135 MMHG | HEART RATE: 105 BPM

## 2023-08-18 DIAGNOSIS — Z98.890 H/O CERVICAL SPINE SURGERY: ICD-10-CM

## 2023-08-18 DIAGNOSIS — M47.812 ARTHROPATHY OF CERVICAL FACET JOINT: Primary | ICD-10-CM

## 2023-08-18 DIAGNOSIS — M79.18 MYOFASCIAL PAIN: ICD-10-CM

## 2023-08-18 PROCEDURE — 99214 OFFICE O/P EST MOD 30 MIN: CPT | Performed by: PHYSICAL MEDICINE & REHABILITATION

## 2023-08-18 ASSESSMENT — PAIN SCALES - GENERAL: PAINLEVEL: MODERATE PAIN (4)
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
## 2023-08-18 NOTE — PATIENT INSTRUCTIONS
A Left Cervical medial branch block has been ordered today. Please schedule this injection at least 2 weeks from now to allow time for insurance prior authorization. On the day of your injection, you cannot be sick or taking antibiotics. If you become sick and are prescribed, please call the clinic so your injection can be rescheduled for once you have completed your antibiotics. You will need to bring a  with you for your injection. If you have any questions or concerns prior to your injection, please do not hesitate to call the nurse navigation line at [phone].

## 2023-08-22 ENCOUNTER — THERAPY VISIT (OUTPATIENT)
Dept: PHYSICAL THERAPY | Facility: REHABILITATION | Age: 78
End: 2023-08-22
Payer: COMMERCIAL

## 2023-08-22 DIAGNOSIS — M54.2 CERVICAL SPINE PAIN: Primary | ICD-10-CM

## 2023-08-22 DIAGNOSIS — M79.18 MYOFASCIAL PAIN: ICD-10-CM

## 2023-08-22 DIAGNOSIS — Z98.890 H/O CERVICAL SPINE SURGERY: ICD-10-CM

## 2023-08-22 DIAGNOSIS — M47.812 ARTHROPATHY OF CERVICAL FACET JOINT: ICD-10-CM

## 2023-08-22 PROCEDURE — 97140 MANUAL THERAPY 1/> REGIONS: CPT | Mod: GP

## 2023-08-22 PROCEDURE — 97110 THERAPEUTIC EXERCISES: CPT | Mod: GP

## 2023-08-24 ENCOUNTER — TELEPHONE (OUTPATIENT)
Dept: PHYSICAL MEDICINE AND REHAB | Facility: CLINIC | Age: 78
End: 2023-08-24
Payer: COMMERCIAL

## 2023-08-24 DIAGNOSIS — M47.812 ARTHROPATHY OF CERVICAL FACET JOINT: Primary | ICD-10-CM

## 2023-08-24 DIAGNOSIS — Z98.890 H/O CERVICAL SPINE SURGERY: ICD-10-CM

## 2023-08-24 NOTE — TELEPHONE ENCOUNTER
Please contact the patient and inform her that there is been an issue with the medial branch blocks and insurance.  I reviewed my notes again.  It appears that she had cervical medial branch blocks on the left at C2,3,4 at [facility].  With only benefit on initial medial branch blocks.      It looks as if she has C1-2 facet arthropathy on the MRI and x-rays.  Since insurance is denying a repeat C2, 3, 4 medial branch blocks as they were done at [facility] this year, I would recommend getting a CT scan with potential for peripheral nerve stimulator if the C1 -2 facet joint shows the significant arthropathy that I expect

## 2023-08-25 ENCOUNTER — THERAPY VISIT (OUTPATIENT)
Dept: PHYSICAL THERAPY | Facility: REHABILITATION | Age: 78
End: 2023-08-25
Payer: COMMERCIAL

## 2023-08-25 DIAGNOSIS — M54.2 CERVICAL SPINE PAIN: Primary | ICD-10-CM

## 2023-08-25 DIAGNOSIS — M47.812 ARTHROPATHY OF CERVICAL FACET JOINT: ICD-10-CM

## 2023-08-25 DIAGNOSIS — Z98.890 H/O CERVICAL SPINE SURGERY: ICD-10-CM

## 2023-08-25 DIAGNOSIS — M79.18 MYOFASCIAL PAIN: ICD-10-CM

## 2023-08-25 PROCEDURE — 97140 MANUAL THERAPY 1/> REGIONS: CPT | Mod: GP

## 2023-08-25 PROCEDURE — 97110 THERAPEUTIC EXERCISES: CPT | Mod: GP

## 2023-08-25 NOTE — TELEPHONE ENCOUNTER
Phone call to patient to discuss insurance issue for ordered MBBs and next step recommended per PSP. Information reviewed in full. Patient will have the CT as ordered and await call with results. Asks that appointment for MBBs be cancelled; done as requested.

## 2023-08-29 ENCOUNTER — HOSPITAL ENCOUNTER (OUTPATIENT)
Dept: CT IMAGING | Facility: HOSPITAL | Age: 78
Discharge: HOME | End: 2023-08-29
Attending: PHYSICAL MEDICINE & REHABILITATION | Admitting: PHYSICAL MEDICINE & REHABILITATION
Payer: COMMERCIAL

## 2023-08-29 ENCOUNTER — THERAPY VISIT (OUTPATIENT)
Dept: PHYSICAL THERAPY | Facility: REHABILITATION | Age: 78
End: 2023-08-29
Payer: COMMERCIAL

## 2023-08-29 DIAGNOSIS — Z98.890 H/O CERVICAL SPINE SURGERY: ICD-10-CM

## 2023-08-29 DIAGNOSIS — M79.18 MYOFASCIAL PAIN: ICD-10-CM

## 2023-08-29 DIAGNOSIS — M54.2 CERVICAL SPINE PAIN: Primary | ICD-10-CM

## 2023-08-29 DIAGNOSIS — M47.812 ARTHROPATHY OF CERVICAL FACET JOINT: ICD-10-CM

## 2023-08-29 PROCEDURE — 97110 THERAPEUTIC EXERCISES: CPT | Mod: GP

## 2023-08-29 PROCEDURE — 72125 CT NECK SPINE W/O DYE: CPT

## 2023-09-01 ENCOUNTER — THERAPY VISIT (OUTPATIENT)
Dept: PHYSICAL THERAPY | Facility: REHABILITATION | Age: 78
End: 2023-09-01
Payer: COMMERCIAL

## 2023-09-01 DIAGNOSIS — M54.2 CERVICAL SPINE PAIN: Primary | ICD-10-CM

## 2023-09-01 DIAGNOSIS — M47.812 ARTHROPATHY OF CERVICAL FACET JOINT: ICD-10-CM

## 2023-09-01 DIAGNOSIS — M79.18 MYOFASCIAL PAIN: ICD-10-CM

## 2023-09-01 DIAGNOSIS — Z98.890 H/O CERVICAL SPINE SURGERY: ICD-10-CM

## 2023-09-01 PROCEDURE — 97110 THERAPEUTIC EXERCISES: CPT | Mod: GP

## 2023-09-05 ENCOUNTER — THERAPY VISIT (OUTPATIENT)
Dept: PHYSICAL THERAPY | Facility: REHABILITATION | Age: 78
End: 2023-09-05
Payer: COMMERCIAL

## 2023-09-05 ENCOUNTER — TELEPHONE (OUTPATIENT)
Dept: PHYSICAL MEDICINE AND REHAB | Facility: CLINIC | Age: 78
End: 2023-09-05

## 2023-09-05 DIAGNOSIS — Z98.890 H/O CERVICAL SPINE SURGERY: ICD-10-CM

## 2023-09-05 DIAGNOSIS — M79.18 MYOFASCIAL PAIN: ICD-10-CM

## 2023-09-05 DIAGNOSIS — M47.812 ARTHROPATHY OF CERVICAL FACET JOINT: ICD-10-CM

## 2023-09-05 DIAGNOSIS — M54.2 CERVICAL SPINE PAIN: Primary | ICD-10-CM

## 2023-09-05 PROCEDURE — 97110 THERAPEUTIC EXERCISES: CPT | Mod: GP

## 2023-09-06 NOTE — TELEPHONE ENCOUNTER
Call placed to patient with provider's results and recommendations.  Pt stated understanding.   Pt would like to follow-up with Dr. Shaffer to discuss further. She is not at home and available to make an appt but she states she will call back to do so.

## 2023-09-12 ENCOUNTER — THERAPY VISIT (OUTPATIENT)
Dept: PHYSICAL THERAPY | Facility: REHABILITATION | Age: 78
End: 2023-09-12
Payer: COMMERCIAL

## 2023-09-12 DIAGNOSIS — M47.812 ARTHROPATHY OF CERVICAL FACET JOINT: ICD-10-CM

## 2023-09-12 DIAGNOSIS — Z98.890 H/O CERVICAL SPINE SURGERY: ICD-10-CM

## 2023-09-12 DIAGNOSIS — M79.18 MYOFASCIAL PAIN: ICD-10-CM

## 2023-09-12 DIAGNOSIS — M54.2 CERVICAL SPINE PAIN: Primary | ICD-10-CM

## 2023-09-12 PROCEDURE — 97535 SELF CARE MNGMENT TRAINING: CPT | Mod: GP

## 2023-09-12 PROCEDURE — 97110 THERAPEUTIC EXERCISES: CPT | Mod: GP

## 2023-09-19 ENCOUNTER — THERAPY VISIT (OUTPATIENT)
Dept: PHYSICAL THERAPY | Facility: REHABILITATION | Age: 78
End: 2023-09-19
Payer: COMMERCIAL

## 2023-09-19 DIAGNOSIS — M54.2 CERVICAL SPINE PAIN: Primary | ICD-10-CM

## 2023-09-19 DIAGNOSIS — M79.18 MYOFASCIAL PAIN: ICD-10-CM

## 2023-09-19 DIAGNOSIS — M47.812 ARTHROPATHY OF CERVICAL FACET JOINT: ICD-10-CM

## 2023-09-19 DIAGNOSIS — Z98.890 H/O CERVICAL SPINE SURGERY: ICD-10-CM

## 2023-09-19 PROCEDURE — 97110 THERAPEUTIC EXERCISES: CPT | Mod: GP

## 2023-09-25 ENCOUNTER — OFFICE VISIT (OUTPATIENT)
Dept: RHEUMATOLOGY | Facility: CLINIC | Age: 78
End: 2023-09-25
Payer: COMMERCIAL

## 2023-09-25 VITALS
HEART RATE: 88 BPM | WEIGHT: 150.7 LBS | DIASTOLIC BLOOD PRESSURE: 70 MMHG | SYSTOLIC BLOOD PRESSURE: 120 MMHG | BODY MASS INDEX: 23.6 KG/M2

## 2023-09-25 DIAGNOSIS — M35.01 SJOGREN'S SYNDROME WITH KERATOCONJUNCTIVITIS SICCA (H): Primary | ICD-10-CM

## 2023-09-25 DIAGNOSIS — R05.9 COUGH, UNSPECIFIED TYPE: ICD-10-CM

## 2023-09-25 DIAGNOSIS — R63.4 WEIGHT LOSS: ICD-10-CM

## 2023-09-25 PROCEDURE — 99214 OFFICE O/P EST MOD 30 MIN: CPT | Performed by: PHYSICIAN ASSISTANT

## 2023-09-25 RX ORDER — IBUPROFEN 200 MG
200 TABLET ORAL EVERY 4 HOURS PRN
COMMUNITY
End: 2024-03-14

## 2023-09-25 NOTE — PROGRESS NOTES
Rheumatology Clinic Visit  [facility]  PATRIZIA Wagner     Carolina García MRN# [id number]   YOB: 1945 Age: 78 year old   Date of Visit: 09/25/2023  Primary care provider: Rich Kelly          Assessment and Plan:     1.  Sjogren's syndrome with keratoconjunctivitis sicca  2.  Cough  3. Weight loss    Patient presents today for follow up for Sjogren's syndrome.  Overall Sjogren's syndrome has been a stable.  She manages her dry mouth with pilocarpine twice daily.  She states that her chronic cough has worsened.  She has also noted weight loss.  She does have a follow-up with her primary care provider in November.  Physical examination today did not show any active inflammatory arthritic changes.  There is no palpable cervical lymphadenopathy.     For her Sjogren's syndrome, we will continue on the pilocarpine 5 mg twice daily.  We will also get a CT scan of her chest to further evaluate chronic cough.  Encouraged her to keep that follow-up with her primary care provider to further evaluate her persistent weight loss.         Plan:     Schedule follow-up with Saranya Law PA-C in 6 months.   Imaging: CT chest  Medication recommendations:   Continue Pilocarpine 5mg twice daily  Look up the Sjogren's foundation website    PATRIZIA Wagner  Rheumatology         History of Present Illness:   Carolina García presents for evaluation of Sjogren's syndrome, OA, Subcutaneous lupus. Her PMH includes liver cirrhosis of unclear etiology.     Rheumatological history:  Provider(s): Dr. Tony May, Dr. Resendiz, Dr. Townsend  Last office visit: 06/20/2022  Pertinent lab history: Positive THEE, SSA/SSB, Rheumatoid factor, Negative CCP, other THEE subsets, Hep B and C  Previous medications tried: Evoxac (costly), Plaquenil (shortness of breath), Pilocarpine BID (TID causes lightheadedness)  Current medications: Pilocarpine 5mg twice daily      Interval history September 25, 2023:  She reports  "that she has continued to lose weight. She states that her cheeks are starting to skin in. She has had some episodes of shortness of breath with walking as well. She has an appointment with primary care on 11/10. No fevers. No skin rashes or mouth sores. She states that her joints ache. She states that today is good day. It seems to depend on the weather. Her knees get sore occasionally.     She states that at times she has worse dry mouth than other times. She has continued on the pilocarpine twice daily. She states that her cough has been getting worse. She states that she will get episodes of the dry cough.     Interval history March 27, 2023:  She stopped the hydroxychloroquine, she became significantly fatigued and stomach upset. She stopped it after 2 weeks. These stopped with discontinuing the medication.    She continues with significant dry eyes. She has also developed a cough. She does continue on the Pilocarpine for her dry mouth. She has some days where it does not help.She uses a lot of Biotene. She tried the lozenges, but it made her mouth feel very sore. This can also be dependent on what she eats. She states that her joints are doing good.       HPI from Consult:  She states that the Pilocarpine doesn't completely helps. She continues to drink a lot of fluids. She sees the eye doctor regularly and she has been given eye drops and recently given some samples of \"sniffs\" which helps but, even with GoodRx it is $500. She has also been using over the counter options as well. Her joints are sore at times. She has artificial knees and when the weather changes she will have increased soreness.    No known heart conditions. No retinopathy. She does have the start of cataracts.          Review of Systems:     Constitutional: negative  Skin: negative  Eyes: negative  Ears/Nose/Throat: negative  Respiratory: No shortness of breath, dyspnea on exertion, cough, or hemoptysis  Cardiovascular: " negative  Gastrointestinal: negative  Genitourinary: negative  Musculoskeletal: as above  Neurologic: negative  Psychiatric: negative  Hematologic/Lymphatic/Immunologic: negative  Endocrine: negative         Active Problem List:     Patient Active Problem List    Diagnosis Date Noted    Cervical spine pain 07/14/2023     Priority: Medium    Myofascial pain 07/14/2023     Priority: Medium    Arthropathy of cervical facet joint 07/14/2023     Priority: Medium    H/O cervical spine surgery 07/14/2023     Priority: Medium    Nausea and vomiting 04/29/2021     Priority: Medium    Abnormal liver function tests 04/29/2021     Priority: Medium    Nonspecific abdominal pain 10/09/2020     Priority: Medium    Precordial chest pain      Priority: Medium    Abnormal nuclear stress test 08/15/2019     Priority: Medium     Added automatically from request for surgery [id number]        Localized primary carpometacarpal osteoarthritis 10/24/2018     Priority: Medium    Sjogren's syndrome (H) 08/20/2018     Priority: Medium    Primary osteoarthritis involving multiple joints 08/20/2018     Priority: Medium    THEE positive 08/20/2018     Priority: Medium    Anxiety      Priority: Medium     Created by Conversion  Replacement Utility updated for latest IMO load        Hypertension      Priority: Medium     Created by Conversion  Replacement Utility updated for latest IMO load        Rotator Cuff Tendonitis      Priority: Medium     Created by Conversion  Replacement Utility updated for latest IMO load        Allergic Rhinitis      Priority: Medium     Created by Conversion  Replacement Utility updated for latest IMO load        Vitamin D Deficiency      Priority: Medium     Created by Conversion  Replacement Utility updated for latest IMO load        Menopause Has Occurred      Priority: Medium     Created by Conversion  Replacement Utility updated for latest IMO load        Hypocomplementemia (H) 01/13/2016     Priority: Medium     Subacute cutaneous lupus erythematosus 10/13/2015     Priority: Medium    Cervical radiculopathy at C5 06/17/2015     Priority: Medium    Myelopathy, spondylogenic, cervical 06/17/2015     Priority: Medium    Lower Back Pain Chronic      Priority: Medium     Created by Conversion        Diverticular disease of colon 09/23/2014     Priority: Medium    Esophageal Reflux      Priority: Medium     Created by Conversion                Past Medical History:     Past Medical History:   Diagnosis Date    GERD (gastroesophageal reflux disease)     Hypertension     Neck pain     Osteoarthritis 1/13/2016    Systemic lupus (H)      Past Surgical History:   Procedure Laterality Date    ARTHROPLASTY KNEE BILATERAL      BIOPSY BREAST Right 2005    benign    CV CORONARY ANGIOGRAM N/A 8/22/2019    Procedure: Coronary Angiogram;  Surgeon: Melissa Vasquez MD;  Location: [facility];  Service: Cardiology    CV LEFT HEART CATHETERIZATION WITHOUT LEFT VENTRICULOGRAM Left 8/22/2019    Procedure: Left Heart Catheterization Without Left Ventriculogram;  Surgeon: Melissa Vasquez MD;  Location: [facility];  Service: Cardiology    FISSURECTOMY RECTUM      HC REMOVAL GALLBLADDER      Description: Cholecystectomy;  Recorded: 04/16/2012;    HYSTERECTOMY  1986    IN TOT DISC ARTHRP ART DISC ANT APPRO 1 NTRSPC CRV N/A 6/17/2015    Procedure:  C45 MOBI-C DISC ARTHORPLASTY, ATTEMPT MOBI C C56, ANTERIOR CERVICAL DISCECTOMY AND FUSION IF UNABLE TO PLACE ;  Surgeon: Korina Beltrán MD;  Location: [facility];  Service: Spine    ZC GASTROPLASTY,OBESITY,OTHER      Description: Gastric Surgery For Morbid Obesity Gastric Stapling;  Recorded: 04/16/2012;            Social History:     Social History     Socioeconomic History    Marital status:      Spouse name: Not on file    Number of children: 3    Years of education: Not on file    Highest education level: Not on file   Occupational History    Not on file   Tobacco Use     Smoking status: Never    Smokeless tobacco: Never   Substance and Sexual Activity    Alcohol use: Yes     Comment: Alcoholic Drinks/day: glass of wine on occasion    Drug use: No    Sexual activity: Yes     Partners: Male   Other Topics Concern    Not on file   Social History Narrative    Not on file     Social Determinants of Health     Financial Resource Strain: Not on file   Food Insecurity: Not on file   Transportation Needs: Not on file   Physical Activity: Not on file   Stress: Not on file   Social Connections: Not on file   Interpersonal Safety: Not on file   Housing Stability: Not on file          Family History:     Family History   Problem Relation Age of Onset    Breast Cancer Sister 52.00    Cancer Sister     Breast Cancer Sister 60.00            Allergies:     Allergies   Allergen Reactions    Hydroxychloroquine Shortness Of Breath    Codeine Unknown     Emesis, tired    Egg White [Egg White (Egg Protein)] Unknown     Dumping syndrome            Medications:     Current Outpatient Medications   Medication Sig Dispense Refill    amLODIPine (NORVASC) 5 MG tablet TAKE 1 TABLET BY MOUTH EVERY DAY 90 tablet 3    CARBOXYMETHYLCELL/HYPROMELLOSE (GENTEAL GEL OPHT) [CARBOXYMETHYLCELL/HYPROMELLOSE (GENTEAL GEL OPHT)] Apply 2 drops to eye as needed.      citalopram (CELEXA) 20 MG tablet TAKE 1 & 1/2 TABLETS BY MOUTH ONCE A  tablet 1    diclofenac sodium (VOLTAREN) 1 % Gel [DICLOFENAC SODIUM (VOLTAREN) 1 % GEL] Apply 2 g topically daily as needed.      ibuprofen (ADVIL/MOTRIN) 200 MG tablet Take 200 mg by mouth every 4 hours as needed for pain      loratadine (CLARITIN) 10 mg tablet [LORATADINE (CLARITIN) 10 MG TABLET] Take 10 mg by mouth daily.      losartan (COZAAR) 100 MG tablet TAKE 1 TABLET BY MOUTH EVERY DAY 90 tablet 3    multivitamin therapeutic tablet [MULTIVITAMIN THERAPEUTIC TABLET] Take 1 tablet by mouth daily.      mupirocin (BACTROBAN) 2 % external ointment Apply topically 2 times daily 30 g  0    OMEGA-3/DHA/EPA/FISH OIL (FISH OIL-OMEGA-3 FATTY ACIDS) 300-1,000 mg capsule [OMEGA-3/DHA/EPA/FISH OIL (FISH OIL-OMEGA-3 FATTY ACIDS) 300-1,000 MG CAPSULE] Take 2 g by mouth daily.      omeprazole (PRILOSEC) 20 MG capsule [OMEPRAZOLE (PRILOSEC) 20 MG CAPSULE] Take 2 capsules (40 mg total) by mouth 2 (two) times a day before meals. 120 capsule 1    pilocarpine (SALAGEN) 5 MG tablet Take 1 tablet (5 mg) by mouth 2 times daily as needed Reminder of Due for follow up in  tablet 0    polyvinyl alcohol (LIQUIFILM TEARS) 1.4 % ophthalmic solution [POLYVINYL ALCOHOL (LIQUIFILM TEARS) 1.4 % OPHTHALMIC SOLUTION] Administer 1 drop to both eyes as needed for dry eyes.      acetaminophen (TYLENOL) 500 MG tablet [ACETAMINOPHEN (TYLENOL) 500 MG TABLET] Take 500 mg by mouth 2 (two) times a day. (Patient not taking: Reported on 2023)      hydroxychloroquine (PLAQUENIL) 200 MG tablet Take 1 tablet (200 mg) by mouth every other day Annual Plaquenil toxicity eye screening required. (Patient not taking: Reported on 3/27/2023) 15 tablet 2            Physical Exam:   Blood pressure 120/70, pulse 88, weight 68.4 kg (150 lb 11.2 oz).  Wt Readings from Last 6 Encounters:   23 68.4 kg (150 lb 11.2 oz)   23 69.4 kg (153 lb)   23 71.2 kg (157 lb)   22 70.3 kg (155 lb)   22 76.2 kg (168 lb)   11/15/21 77.2 kg (170 lb 3.2 oz)     Constitutional: well-developed, appearing stated age; cooperative  Eyes: nl conjunctiva, sclera  ENT: nl external ears, nose, hearing, lips,  Neck: no visible mass or thyroid enlargement; no palpable adenopathy  Resp: No shortness of breath with normal conversation  MSK: no synovitis or dactylitis. Full fist formation.   Psych: nl judgement, orientation, memory, affect.           Data:   [id number] CXR  FINDINGS: Negative chest.    MRI cervical spine 2023  IMPRESSION:  1.  Artificial disc replacement at C4-C5 and interbody fusion device at C5-C6.  2.  At  C5-C6, there is severe right and moderate left neural foraminal stenosis.  3.  At C4-C5, there is moderate right neural foraminal stenosis.    CT cervical spine 8/29/2023  IMPRESSION:     1.  No acute fracture or traumatic malalignment of the cervical spine.  2.  Postsurgical changes at C4-C5 and C5-C6 as described above. No evidence of hardware failure.  3.  Multilevel cervical spondylosis including multilevel facet arthropathy as described above.    Laboratory:  11/9/2022  Albumin 3.3  ,   White blood cell count 3.6, hemoglobin 11.2, platelet count 260    2/7/2023  Creatinine 0.89, GFR 67  Albumin 3.3  ALT 75, AST 95  White blood cell count 3.6, hemoglobin 11.2, platelet count 149    5/20/2023  Albumin 3.0  ALT 74,

## 2023-09-25 NOTE — PATIENT INSTRUCTIONS
After Visit Instructions:     Thank you for coming to [facility] for your care. It is my goal to partner with you to help you reach your optimal state of health.       Plan:     Schedule follow-up with Saranya Law PA-C in 6 months.   Imaging: CT chest  Medication recommendations:   Continue Pilocarpine 5mg twice daily  Look up the Sjogren's foundation website        Saranya Law PA-C  [facility]  [facility]    Contact information: [facility]  Clinic Number:  [phone]  Please call or send a [employer] message with any questions about your care

## 2023-09-26 ENCOUNTER — THERAPY VISIT (OUTPATIENT)
Dept: PHYSICAL THERAPY | Facility: REHABILITATION | Age: 78
End: 2023-09-26
Payer: COMMERCIAL

## 2023-09-26 DIAGNOSIS — Z98.890 H/O CERVICAL SPINE SURGERY: ICD-10-CM

## 2023-09-26 DIAGNOSIS — M54.2 CERVICAL SPINE PAIN: Primary | ICD-10-CM

## 2023-09-26 DIAGNOSIS — M79.18 MYOFASCIAL PAIN: ICD-10-CM

## 2023-09-26 DIAGNOSIS — M47.812 ARTHROPATHY OF CERVICAL FACET JOINT: ICD-10-CM

## 2023-09-26 PROBLEM — K75.81 NONALCOHOLIC STEATOHEPATITIS: Status: ACTIVE | Noted: 2021-06-07

## 2023-09-26 PROBLEM — K74.60 CIRRHOSIS OF LIVER (H): Status: ACTIVE | Noted: 2023-09-26

## 2023-09-26 PROBLEM — K29.70 GASTRITIS: Status: ACTIVE | Noted: 2020-10-13

## 2023-09-26 PROCEDURE — 97110 THERAPEUTIC EXERCISES: CPT | Mod: GP

## 2023-10-03 ENCOUNTER — OFFICE VISIT (OUTPATIENT)
Dept: FAMILY MEDICINE | Facility: CLINIC | Age: 78
End: 2023-10-03
Payer: COMMERCIAL

## 2023-10-03 VITALS
BODY MASS INDEX: 23.37 KG/M2 | HEART RATE: 91 BPM | DIASTOLIC BLOOD PRESSURE: 66 MMHG | RESPIRATION RATE: 18 BRPM | OXYGEN SATURATION: 100 % | HEIGHT: 67 IN | SYSTOLIC BLOOD PRESSURE: 124 MMHG | WEIGHT: 148.9 LBS | TEMPERATURE: 95.4 F

## 2023-10-03 DIAGNOSIS — R63.4 WEIGHT LOSS: ICD-10-CM

## 2023-10-03 DIAGNOSIS — M35.01 SJOGREN'S SYNDROME WITH KERATOCONJUNCTIVITIS SICCA (H): ICD-10-CM

## 2023-10-03 DIAGNOSIS — I10 ESSENTIAL HYPERTENSION: ICD-10-CM

## 2023-10-03 DIAGNOSIS — M85.80 OSTEOPENIA, UNSPECIFIED LOCATION: ICD-10-CM

## 2023-10-03 DIAGNOSIS — Z12.31 ENCOUNTER FOR SCREENING MAMMOGRAM FOR BREAST CANCER: ICD-10-CM

## 2023-10-03 DIAGNOSIS — R73.01 IMPAIRED FASTING GLUCOSE: ICD-10-CM

## 2023-10-03 DIAGNOSIS — R04.0 EPISTAXIS: ICD-10-CM

## 2023-10-03 DIAGNOSIS — R53.83 OTHER FATIGUE: ICD-10-CM

## 2023-10-03 DIAGNOSIS — R63.0 DECREASED APPETITE: ICD-10-CM

## 2023-10-03 DIAGNOSIS — E55.9 VITAMIN D DEFICIENCY: ICD-10-CM

## 2023-10-03 DIAGNOSIS — Z78.0 POST-MENOPAUSAL: ICD-10-CM

## 2023-10-03 DIAGNOSIS — Z00.00 MEDICARE ANNUAL WELLNESS VISIT, SUBSEQUENT: Primary | ICD-10-CM

## 2023-10-03 DIAGNOSIS — F41.1 ANXIETY STATE: ICD-10-CM

## 2023-10-03 LAB
CORTIS SERPL-MCNC: 15.2 UG/DL
HBA1C MFR BLD: 5.4 % (ref 0–5.6)
INR PPP: 1.21 (ref 0.85–1.15)
TOTAL PROTEIN SERUM FOR ELP: 7.4 G/DL (ref 6.4–8.3)
TSH SERPL DL<=0.005 MIU/L-ACNC: 3.16 UIU/ML (ref 0.3–4.2)
VIT B12 SERPL-MCNC: 1210 PG/ML (ref 232–1245)
VIT D+METAB SERPL-MCNC: 61 NG/ML (ref 20–50)

## 2023-10-03 PROCEDURE — 82533 TOTAL CORTISOL: CPT | Performed by: FAMILY MEDICINE

## 2023-10-03 PROCEDURE — 36415 COLL VENOUS BLD VENIPUNCTURE: CPT | Performed by: FAMILY MEDICINE

## 2023-10-03 PROCEDURE — G0439 PPPS, SUBSEQ VISIT: HCPCS | Performed by: FAMILY MEDICINE

## 2023-10-03 PROCEDURE — 83036 HEMOGLOBIN GLYCOSYLATED A1C: CPT | Performed by: FAMILY MEDICINE

## 2023-10-03 PROCEDURE — 84155 ASSAY OF PROTEIN SERUM: CPT | Performed by: FAMILY MEDICINE

## 2023-10-03 PROCEDURE — 86258 DGP ANTIBODY EACH IG CLASS: CPT | Performed by: FAMILY MEDICINE

## 2023-10-03 PROCEDURE — 86231 EMA EACH IG CLASS: CPT | Mod: 90 | Performed by: FAMILY MEDICINE

## 2023-10-03 PROCEDURE — 86364 TISS TRNSGLTMNASE EA IG CLAS: CPT | Performed by: FAMILY MEDICINE

## 2023-10-03 PROCEDURE — 82306 VITAMIN D 25 HYDROXY: CPT | Performed by: FAMILY MEDICINE

## 2023-10-03 PROCEDURE — 99214 OFFICE O/P EST MOD 30 MIN: CPT | Mod: 25 | Performed by: FAMILY MEDICINE

## 2023-10-03 PROCEDURE — 99000 SPECIMEN HANDLING OFFICE-LAB: CPT | Performed by: FAMILY MEDICINE

## 2023-10-03 PROCEDURE — 82784 ASSAY IGA/IGD/IGG/IGM EACH: CPT | Performed by: FAMILY MEDICINE

## 2023-10-03 PROCEDURE — 84443 ASSAY THYROID STIM HORMONE: CPT | Performed by: FAMILY MEDICINE

## 2023-10-03 PROCEDURE — 82607 VITAMIN B-12: CPT | Performed by: FAMILY MEDICINE

## 2023-10-03 PROCEDURE — 85610 PROTHROMBIN TIME: CPT | Performed by: FAMILY MEDICINE

## 2023-10-03 PROCEDURE — 84165 PROTEIN E-PHORESIS SERUM: CPT

## 2023-10-03 ASSESSMENT — PAIN SCALES - GENERAL: PAINLEVEL: NO PAIN (0)

## 2023-10-03 ASSESSMENT — ENCOUNTER SYMPTOMS
ABDOMINAL PAIN: 0
HEMATURIA: 0
CONSTIPATION: 0
HEMATOCHEZIA: 0
JOINT SWELLING: 0
NERVOUS/ANXIOUS: 1
CHILLS: 0
FEVER: 0
HEADACHES: 0
DIARRHEA: 0
NAUSEA: 0
PARESTHESIAS: 1
EYE PAIN: 0
PALPITATIONS: 0
HEARTBURN: 0
MYALGIAS: 1
BREAST MASS: 0
DIZZINESS: 0
WEAKNESS: 1
FREQUENCY: 0
COUGH: 1
DYSURIA: 0
SHORTNESS OF BREATH: 1
SORE THROAT: 0
ARTHRALGIAS: 1

## 2023-10-03 ASSESSMENT — ACTIVITIES OF DAILY LIVING (ADL): CURRENT_FUNCTION: NO ASSISTANCE NEEDED

## 2023-10-03 NOTE — PROGRESS NOTES
SUBJECTIVE:   Carolina is a 78 year old who presents for Preventive Visit.    Medical studies significant for Sjogrens syndrome, cutaneous lupus, history of gastric bypass surgery, hypertension, elevated liver function tests with steatosis and cirrhosis followed by gastroenterology, osteoarthritis, postmenopausal state, osteopenia, acid reflux, anxiety, recent concerns for chronic cough with plan for CT scan of the chest tomorrow to workup further. Patient is also very concerned about ongoing weight loss. I last saw her nearly 2 years ago her weight was 170. Beginning in about that time her weight began to drop to her current weight of 148 pounds. She does not feel any effort has been made to lose weight. She reports that she has an appetite that eats overall a lot less than she used to she denies symptoms such as nausea vomiting diarrhea. She occasionally experiences some constipation which is treated with over-the-counter medications without concern. She outlined typical days diet for me which does indicate probably a narrow nutritional scope but would not be suggestive of significant malnutrition in itself.    From a screening standpoint she will come due for colonoscopy in about one year. She is overdue for mammogram with a family history and her sister of breast cancer. She herself has never had an issue. She has history of osteopenia with the next scan obtained in 2021. Fracture risk was not high enough to warrant consideration of active treatment. Discussed reassessment this coming December.    We discussed other considerations related to healthcare maintenance/prevention including immunizations.    Are you in the first 12 months of your Medicare coverage?  No    Have you ever done Advance Care Planning? (For example, a Health Directive, POLST, or a discussion with a medical provider or your loved ones about your wishes): Yes, advance care planning is on file.    Fall risk  Fallen 2 or more times in the past  "year?: No  Any fall with injury in the past year?: No    Cognitive Screening: performedAnswers submitted by the patient for this visit:  Annual Preventive Visit (Submitted on 10/3/2023)  Chief Complaint: Annual Exam:  In general, how would you rate your overall physical health?: fair  Frequency of exercise:: None  Do you usually eat at least 4 servings of fruit and vegetables a day, include whole grains & fiber, and avoid regularly eating high fat or \"junk\" foods? : No  Taking medications regularly:: Yes  Medication side effects:: None  Activities of Daily Living: no assistance needed  Home safety: no safety concerns identified  Hearing Impairment:: no hearing concerns  In the past 6 months, have you been bothered by leaking of urine?: Yes  In general, how would you rate your overall mental or emotional health?: good  Additional concerns today:: No    1) Repeat 3 items: 3/3   2) Clock draw: NORMAL  3) 3 item recall: Recalls 3 objects  Results: 3 items recalled: COGNITIVE IMPAIRMENT LESS LIKELY    Mini-CogTM Copyright CLARE Lopez. Licensed by the author for use in [facility]; reprinted with permission (soob@[facility]). All rights reserved.      Do you have sleep apnea, excessive snoring or daytime drowsiness? : unsure    Reviewed and updated as needed this visit by clinical staff      Reviewed and updated as needed this visit by Provider    Social History     Tobacco Use    Smoking status: Never    Smokeless tobacco: Never   Substance Use Topics    Alcohol use: Yes     Comment: Alcoholic Drinks/day: glass of wine on occasion             10/3/2023     8:25 AM   Alcohol Use   Prescreen: >3 drinks/day or >7 drinks/week? No          No data to display              Do you have a current opioid prescription? No  Do you use any other controlled substances or medications that are not prescribed by a provider? None      Current providers sharing in care for this patient include:   Patient Care Team:  Rich Kelly, " "MD as PCP - General  Rich Kelly MD as Assigned PCP  Saranya Law PA-C as Assigned Rheumatology Provider  Haim Shaffer DO as Assigned Neuroscience Provider    The following health maintenance items are reviewed in Epic and correct as of today:  Health Maintenance   Topic Date Due    ANNUAL REVIEW OF  ORDERS  Never done    MENINGITIS IMMUNIZATION (1 - Risk 2-dose series) Never done    ZOSTER IMMUNIZATION (1 of 2) Never done    INFLUENZA VACCINE (1) Never done    COVID-19 Vaccine (6 - 2023-24 season) 09/01/2023    MEDICARE ANNUAL WELLNESS VISIT  10/03/2024    FALL RISK ASSESSMENT  10/03/2024    LIPID  11/15/2026    ADVANCE CARE PLANNING  10/03/2028    DTAP/TDAP/TD IMMUNIZATION (3 - Td or Tdap) 11/15/2031    DEXA  12/07/2036    HEPATITIS C SCREENING  Completed    PHQ-2 (once per calendar year)  Completed    Pneumococcal Vaccine: 65+ Years  Completed    HEPATITIS A IMMUNIZATION  Completed    HEPATITIS B IMMUNIZATION  Completed    IPV IMMUNIZATION  Aged Out    HPV IMMUNIZATION  Aged Out    MAMMO SCREENING  Discontinued    COLORECTAL CANCER SCREENING  Discontinued             Pertinent mammograms are reviewed under the imaging tab.    Review of Systems  Complete review of systems is obtained.  Other than the specific considerations noted above complete review of systems is negative.      OBJECTIVE:   /66   Pulse 91   Temp (!) 95.4  F (35.2  C) (Temporal)   Resp 18   Ht 1.702 m (5' 7\")   Wt 67.5 kg (148 lb 14.4 oz)   SpO2 100%   BMI 23.32 kg/m   Estimated body mass index is 23.32 kg/m  as calculated from the following:    Height as of this encounter: 1.702 m (5' 7\").    Weight as of this encounter: 67.5 kg (148 lb 14.4 oz).  Physical Exam          General Appearance:    Alert, cooperative, no distress   Eyes:   No scleral icterus or conjunctival irritation       Ears:    Normal TM's and external ear canals, both ears   Throat:   Lips, mucosa, and tongue normal; teeth and gums normal "   Neck:   Supple, symmetrical, trachea midline, no adenopathy;        thyroid:  No enlargement/tenderness/nodules   Lungs:     Clear to auscultation bilaterally, respirations unlabored, no wheezes or crackles   Heart:    Regular rate and rhythm,  No murmur   Abdomen:    Soft, no distention, no tenderness on palpation, no masses, no organomegaly     Extremities:  No edema, no joint swelling or redness, no evidence of any injuries   Skin: chronic rash on arms which is reported to be chronic cutaneous lupus rash.   Neurologic:  On gross examination there is no motor or sensory deficit.  Patient walks with a normal gait               ASSESSMENT / PLAN:   Carolina was seen today for wellness visit and weight problem.    Diagnoses and all orders for this visit:    Medicare annual wellness visit, subsequent    Anxiety state    Sjogren's syndrome with keratoconjunctivitis sicca (H24)    Essential hypertension  -     Comprehensive metabolic panel; Future    Weight loss  -     Comprehensive metabolic panel; Future  -     CBC with Platelets & Differential; Future  -     Lipid panel reflex to direct LDL Fasting; Future  -     TSH with free T4 reflex; Future  -     Vitamin B12; Future  -     Vitamin D Deficiency; Future  -     Hemoglobin A1c; Future  -     IgA [LAB73]; Future  -     Deamidated Giladin Peptide Cali IgA IgG [GJB8537]; Future  -     Tissue transglutaminase cali IgA and IgG [DBD7539]; Future  -     Endomysial Antibody IgA by IFA [FAJ8262]; Future  -     TSH with free T4 reflex  -     Vitamin B12  -     Vitamin D Deficiency  -     Hemoglobin A1c  -     IgA [LAB73]  -     Deamidated Giladin Peptide Cali IgA IgG [QCN7359]  -     Tissue transglutaminase cali IgA and IgG [LKK0783]  -     Endomysial Antibody IgA by IFA [OKO2183]    Decreased appetite  -     Comprehensive metabolic panel; Future  -     Vitamin B12; Future  -     Vitamin D Deficiency; Future  -     Vitamin B12  -     Vitamin D Deficiency    Other fatigue  -      Lipid panel reflex to direct LDL Fasting; Future  -     TSH with free T4 reflex; Future  -     TSH with free T4 reflex    Post-menopausal  -     DX Hip/Pelvis/Spine; Future    Vitamin D deficiency  -     Cortisol; Future  -     Cortisol    Impaired fasting glucose  -     Protein electrophoresis; Future  -     Protein electrophoresis    Epistaxis  -     INR; Future  -     INR    Osteopenia, unspecified location  -     DX Hip/Pelvis/Spine; Future    Encounter for screening mammogram for breast cancer  -     *MA Screening Digital Bilateral; Future           Patient has been advised of split billing requirements and indicates understanding: Yes      COUNSELING:  Reviewed preventive health counseling, as reflected in patient instructions       Regular exercise       Healthy diet/nutrition       Vision screening       Dental care       Colon cancer screening  Breast Cancer screening  Bone Density maintenance        She reports that she has never smoked. She has never used smokeless tobacco.      Appropriate preventive services were discussed with this patient, including applicable screening as appropriate for fall prevention, nutrition, physical activity, Tobacco-use cessation, weight loss and cognition.  Checklist reviewing preventive services available has been given to the patient.    Reviewed patients plan of care and provided an AVS. The Basic Care Plan (routine screening as documented in Health Maintenance) for [place] meets the Care Plan requirement. This Care Plan has been established and reviewed with the Patient.          Rich Kelly MD, MD  [facility]    Identified Health Risks:        Wt Readings from Last 3 Encounters:   10/03/23 67.5 kg (148 lb 14.4 oz)   09/25/23 68.4 kg (150 lb 11.2 oz)   07/12/23 69.4 kg (153 lb)        BP Readings from Last 6 Encounters:   10/03/23 124/66   09/25/23 120/70   08/18/23 135/60   07/12/23 134/67   03/27/23 120/58   12/29/22 130/60

## 2023-10-04 ENCOUNTER — HOSPITAL ENCOUNTER (OUTPATIENT)
Dept: CT IMAGING | Facility: HOSPITAL | Age: 78
Discharge: HOME | End: 2023-10-04
Attending: PHYSICIAN ASSISTANT | Admitting: PHYSICIAN ASSISTANT
Payer: COMMERCIAL

## 2023-10-04 DIAGNOSIS — R63.4 WEIGHT LOSS: ICD-10-CM

## 2023-10-04 DIAGNOSIS — M35.01 SJOGREN'S SYNDROME WITH KERATOCONJUNCTIVITIS SICCA (H): ICD-10-CM

## 2023-10-04 DIAGNOSIS — R05.9 COUGH, UNSPECIFIED TYPE: ICD-10-CM

## 2023-10-04 LAB
GLIADIN IGA SER-ACNC: 1.4 U/ML
GLIADIN IGG SER-ACNC: <0.6 U/ML
IGA SERPL-MCNC: 669 MG/DL (ref 84–499)
TTG IGA SER-ACNC: 1 U/ML
TTG IGG SER-ACNC: <0.6 U/ML

## 2023-10-04 PROCEDURE — 71250 CT THORAX DX C-: CPT

## 2023-10-05 LAB — ENDOMYSIUM IGA TITR SER IF: NORMAL {TITER}

## 2023-10-06 ENCOUNTER — OFFICE VISIT (OUTPATIENT)
Dept: PHYSICAL MEDICINE AND REHAB | Facility: CLINIC | Age: 78
End: 2023-10-06
Payer: COMMERCIAL

## 2023-10-06 VITALS — DIASTOLIC BLOOD PRESSURE: 77 MMHG | SYSTOLIC BLOOD PRESSURE: 142 MMHG | HEART RATE: 81 BPM

## 2023-10-06 DIAGNOSIS — Z98.890 H/O CERVICAL SPINE SURGERY: ICD-10-CM

## 2023-10-06 DIAGNOSIS — M54.2 CERVICAL SPINE PAIN: ICD-10-CM

## 2023-10-06 DIAGNOSIS — M79.18 MYOFASCIAL PAIN: ICD-10-CM

## 2023-10-06 DIAGNOSIS — M47.812 ARTHROPATHY OF CERVICAL FACET JOINT: Primary | ICD-10-CM

## 2023-10-06 LAB
ALBUMIN SERPL ELPH-MCNC: 3 G/DL (ref 3.7–5.1)
ALPHA1 GLOB SERPL ELPH-MCNC: 0.2 G/DL (ref 0.2–0.4)
ALPHA2 GLOB SERPL ELPH-MCNC: 0.6 G/DL (ref 0.5–0.9)
B-GLOBULIN SERPL ELPH-MCNC: 0.9 G/DL (ref 0.6–1)
GAMMA GLOB SERPL ELPH-MCNC: 2.6 G/DL (ref 0.7–1.6)
M PROTEIN SERPL ELPH-MCNC: 0 G/DL
PROT PATTERN SERPL ELPH-IMP: ABNORMAL

## 2023-10-06 PROCEDURE — 99214 OFFICE O/P EST MOD 30 MIN: CPT | Performed by: PHYSICAL MEDICINE & REHABILITATION

## 2023-10-06 RX ORDER — BACLOFEN 10 MG/1
5-10 TABLET ORAL 2 TIMES DAILY PRN
Qty: 30 TABLET | Refills: 1 | Status: SHIPPED | OUTPATIENT
Start: 2023-10-06 | End: 2023-12-04

## 2023-10-06 ASSESSMENT — PAIN SCALES - GENERAL: PAINLEVEL: MODERATE PAIN (4)
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
## 2023-10-06 NOTE — PATIENT INSTRUCTIONS
Please schedule for Peripheral nerve stim consult with Dr Arrington    2. Baclofen (muscle relaxant medication) has been prescribed today. Please take 5-10mg twice daily as needed for muscle pain. This medication may cause drowsiness. Please do not work or drive while taking this medication until you know how it effects you. If it does make you drowsy, you should only take it before bedtime or at times that you do not have to work/drive.
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
## 2023-10-09 ENCOUNTER — TELEPHONE (OUTPATIENT)
Dept: PHYSICAL MEDICINE AND REHAB | Facility: CLINIC | Age: 78
End: 2023-10-09
Payer: COMMERCIAL

## 2023-10-09 DIAGNOSIS — M79.18 MYOFASCIAL PAIN: ICD-10-CM

## 2023-10-09 DIAGNOSIS — Z98.890 H/O CERVICAL SPINE SURGERY: ICD-10-CM

## 2023-10-09 DIAGNOSIS — M47.812 ARTHROPATHY OF CERVICAL FACET JOINT: Primary | ICD-10-CM

## 2023-10-09 NOTE — TELEPHONE ENCOUNTER
Please contact the patient and inform her that I have spoken with Dr. Arrington.  He is fine placing the peripheral nerve stimulator if she is comfortable with that.  If she would like to see him first, she can keep the current follow-up appointment on November 8.  If she would like to have the stimulator placed, I will place the order and that procedure visit can then be scheduled.

## 2023-10-10 RX ORDER — FLUOCINONIDE 0.5 MG/G
CREAM TOPICAL 2 TIMES DAILY
COMMUNITY
Start: 2023-10-09

## 2023-10-10 NOTE — TELEPHONE ENCOUNTER
Message sent to Pain Center Team to initiate PA process and then call patient to schedule once received.

## 2023-10-10 NOTE — TELEPHONE ENCOUNTER
Called and discussed with patient. She would like to proceed with the PNS. She does not feel she needs the follow-up appt on 11/8. Cancelled this appt.     Informed her that we will get the order placed, will need to get insurance approval and then patient will be contacted to schedule.

## 2023-10-16 ENCOUNTER — VIRTUAL VISIT (OUTPATIENT)
Dept: FAMILY MEDICINE | Facility: CLINIC | Age: 78
End: 2023-10-16
Payer: COMMERCIAL

## 2023-10-16 DIAGNOSIS — R05.3 CHRONIC COUGH: ICD-10-CM

## 2023-10-16 DIAGNOSIS — R63.4 WEIGHT LOSS: Primary | ICD-10-CM

## 2023-10-16 DIAGNOSIS — J00 ACUTE RHINITIS: ICD-10-CM

## 2023-10-16 DIAGNOSIS — Z98.84 S/P GASTRIC BYPASS: ICD-10-CM

## 2023-10-16 PROCEDURE — 99213 OFFICE O/P EST LOW 20 MIN: CPT | Mod: 95 | Performed by: FAMILY MEDICINE

## 2023-10-16 RX ORDER — MUPIROCIN 20 MG/G
OINTMENT TOPICAL 2 TIMES DAILY
Qty: 30 G | Refills: 0 | Status: SHIPPED | OUTPATIENT
Start: 2023-10-16

## 2023-10-16 NOTE — PROGRESS NOTES
Carolina is a 78 year old who is being evaluated via a billable telephone visit.      What phone number would you like to be contacted at? [phone]   How would you like to obtain your AVS? [place]    Distant Location (provider location):  On-site    Carolina was seen today for results.    Diagnoses and all orders for this visit:    Weight loss    Acute rhinitis  -     mupirocin (BACTROBAN) 2 % external ointment; Apply topically 2 times daily    S/P gastric bypass  -     Adult Comprehensive Weight Management  Referral; Future    Chronic cough  -     Adult Pulmonary Medicine  Referral; Future           Subjective   Carolina is a 78 year old, presenting for the following health issues:  No chief complaint on file.            HPI     She was seen recently for annual wellness visit. She does have a history of gastric bypass surgery in the late 1980s. More recently she is known to have decreasing weight that is unexpected. We engaged in extensive laboratory testing not noting any specific findings at her recent visit. She is a chronic cough. She had a CT scan of the lungs ordered by her rheumatologist but did not show any definitive cause for her chronic cough, and showed some incidental pulmonary nodules and given her low risk status with never having been a smoker does not necessarily require specific follow-up. Today given her ongoing cough we discussed referral to pulmonology for help in evaluating. Given her concern regarding progressive weight loss that is unexpected we will refer her to the bariatric specialty program at [facility] to help evaluate her situation to decide if she needs further evaluation, more intensive testing and/or monitoring of her nutritional status.    She is a history of nasal irritation with concern previously for infection today we discussed utilization of mupirocin appointment to help as it had been helpful for similar findings in the past.        Review of  Systems   Complete review of systems is obtained.  Other than the specific considerations noted above complete review of systems is negative.        Objective       Wt Readings from Last 3 Encounters:   10/03/23 67.5 kg (148 lb 14.4 oz)   09/25/23 68.4 kg (150 lb 11.2 oz)   07/12/23 69.4 kg (153 lb)        BP Readings from Last 6 Encounters:   10/06/23 (!) 142/77   10/03/23 124/66   09/25/23 120/70   08/18/23 135/60   07/12/23 134/67   03/27/23 120/58        Hemoglobin A1C   Date Value Ref Range Status   10/03/2023 5.4 0.0 - 5.6 % Final     Comment:     Normal <5.7%   Prediabetes 5.7-6.4%    Diabetes 6.5% or higher     Note: Adopted from ADA consensus guidelines.        Vitals:  No vitals were obtained today due to virtual visit.    Physical Exam   healthy, alert, and no distress  PSYCH: Alert and oriented times 3; coherent speech, normal   rate and volume, able to articulate logical thoughts, able   to abstract reason, no tangential thoughts, no hallucinations   or delusions  Her affect is normal  RESP: No cough, no audible wheezing, able to talk in full sentences  Remainder of exam unable to be completed due to telephone visits                Phone call duration: 10 minutes

## 2023-10-20 ENCOUNTER — HOSPITAL ENCOUNTER (OUTPATIENT)
Dept: ULTRASOUND IMAGING | Facility: HOSPITAL | Age: 78
Discharge: HOME | End: 2023-10-20
Attending: INTERNAL MEDICINE
Payer: COMMERCIAL

## 2023-10-20 ENCOUNTER — LAB (OUTPATIENT)
Dept: LAB | Facility: HOSPITAL | Age: 78
End: 2023-10-20
Attending: INTERNAL MEDICINE
Payer: COMMERCIAL

## 2023-10-20 ENCOUNTER — TELEPHONE (OUTPATIENT)
Dept: PHYSICAL MEDICINE AND REHAB | Facility: CLINIC | Age: 78
End: 2023-10-20

## 2023-10-20 DIAGNOSIS — K74.60 CIRRHOSIS (H): Primary | ICD-10-CM

## 2023-10-20 DIAGNOSIS — K75.81 NONALCOHOLIC STEATOHEPATITIS: ICD-10-CM

## 2023-10-20 DIAGNOSIS — K74.60 CIRRHOSIS OF LIVER WITHOUT ASCITES, UNSPECIFIED HEPATIC CIRRHOSIS TYPE (H): ICD-10-CM

## 2023-10-20 LAB
AFP SERPL-MCNC: 2.7 NG/ML
ALBUMIN SERPL BCG-MCNC: 2.9 G/DL (ref 3.5–5.2)
ALP SERPL-CCNC: 119 U/L (ref 35–104)
ALT SERPL W P-5'-P-CCNC: 72 U/L (ref 0–50)
AST SERPL W P-5'-P-CCNC: 123 U/L (ref 0–45)
BILIRUB DIRECT SERPL-MCNC: 0.24 MG/DL (ref 0–0.3)
BILIRUB SERPL-MCNC: 0.7 MG/DL
PROT SERPL-MCNC: 7.6 G/DL (ref 6.4–8.3)

## 2023-10-20 PROCEDURE — 80076 HEPATIC FUNCTION PANEL: CPT

## 2023-10-20 PROCEDURE — 82105 ALPHA-FETOPROTEIN SERUM: CPT

## 2023-10-20 PROCEDURE — 76705 ECHO EXAM OF ABDOMEN: CPT

## 2023-10-20 PROCEDURE — 36415 COLL VENOUS BLD VENIPUNCTURE: CPT

## 2023-10-20 NOTE — TELEPHONE ENCOUNTER
----- Message from Morena Kennedy sent at 10/18/2023 10:26 AM CDT -----  Regarding: RE: PNS  Patient has been scheduled for Monday, 12-04-23 at 11am, aware to arrive at 10am.  Uma Rodgers notified as well.    ----- Message -----  From: Morena Kennedy  Sent: 10/11/2023  11:32 AM CDT  To: Tania Muro RN; Nelson Whitney; #  Subject: RE: PNS                                          I left a message for patient to call back and schedule.    ----- Message -----  From: Nelson Whitney  Sent: 10/11/2023   6:46 AM CDT  To: Morena Kennedy; Tania Muro RN; #  Subject: RE: PNS                                          Good morning,    PA is not required for PNS. OK to schedule.    Thanks!    Nelson Adkins    Transaction ID: [id number] ID: [id number] Date: 2023-10-11  No Authorization Required  Member ID  Group Number  [id number]  Line of Business  Med[employer]are [employer]age  Date of Service  2023-10-12  Message  Services for members with Medicare products are reviewed using Medicare NCDs, LCDs or other Medicare guidance when available. If no CMS guidance is available, other decision support tools and published criteria will be used to determine medical necessity and appropriateness.    Procedure Code 1  14677    Reference Number  01  Status  NO AUTH REQUIRED    Procedure Code 2  81947    Reference Number  02  Status  NO AUTH REQUIRED  ----- Message -----  From: Morena Kennedy  Sent: 10/10/2023   1:51 PM CDT  To: Tania Muro RN; Nelson Whitney; #  Subject: RE: PNS                                          Please submit for PA if needed, thanks.    ----- Message -----  From: Tania Muro RN  Sent: 10/10/2023  10:43 AM CDT  To: Morena Kennedy; Pain ; Pain Nurse  Subject: RE: PNS                                          Yes, that is correct. Per Dr. Arrington and Dr. Shaffer the appointment was not needed.     Thanks!  Tania   ----- Message -----  From: Brent  Morena ALINA  Sent: 10/10/2023  10:36 AM CDT  To: Tania Muro RN; Pain ; #  Subject: RE: PNS                                          I noticed the appointment for consult with Dr. Arrington was cancelled.  Is that not needed since patient saw Dr. Shaffer?  Just wanting to clarify process :)    ----- Message -----  From: Tania Muro RN  Sent: 10/10/2023   9:58 AM CDT  To: Pain   Subject: PNS                                              Dr. Edmund Hutchins placed an order for this patient to get a peripheral nerve stimulator with Dr. Arrington. Could you please assist with obtaining PA and then schedule the patient?    Thanks!  GEOVANY Marin

## 2023-10-20 NOTE — TELEPHONE ENCOUNTER
PA:  procedure/apt: 12/4/23 at 11 am, arrival time of 10 am  notify rep              sprint (PNS): Yes    RN to contact patient 1 week prior to 12/4/23  Pacemaker/device:  Allergies to contrast/dye:   Antibiotics/active infection  Blood thinner:  INR order placed        Results(3.0 or below):    :

## 2023-10-25 ENCOUNTER — TRANSFERRED RECORDS (OUTPATIENT)
Dept: HEALTH INFORMATION MANAGEMENT | Facility: CLINIC | Age: 78
End: 2023-10-25
Payer: COMMERCIAL

## 2023-11-13 ENCOUNTER — TRANSFERRED RECORDS (OUTPATIENT)
Dept: HEALTH INFORMATION MANAGEMENT | Facility: CLINIC | Age: 78
End: 2023-11-13
Payer: COMMERCIAL

## 2023-11-15 ENCOUNTER — TELEPHONE (OUTPATIENT)
Dept: RHEUMATOLOGY | Facility: CLINIC | Age: 78
End: 2023-11-15
Payer: COMMERCIAL

## 2023-11-15 DIAGNOSIS — L93.2 CUTANEOUS LUPUS ERYTHEMATOSUS: ICD-10-CM

## 2023-11-15 DIAGNOSIS — M35.01 SJOGREN'S SYNDROME WITH KERATOCONJUNCTIVITIS SICCA (H): Primary | ICD-10-CM

## 2023-11-15 NOTE — TELEPHONE ENCOUNTER
Left detailed message for pt to have records from Dermatology faxed. Informed that Saranya does not have any sooner appointments available.

## 2023-11-15 NOTE — TELEPHONE ENCOUNTER
Called pt  back to discuss. Pt first noticed rash about 2 months ago and thought it was from lupus on her arms as she has gotten in the past. Rash has now spread to all extremities, shoulders and breasts. Pt started on Prednisone 10 mg, with a taper schedule every 5 days. Pt wanting to be seen prior to completing prednisone taper.

## 2023-11-15 NOTE — TELEPHONE ENCOUNTER
Did dermatology biopsy the rash? I see her for sjogren's syndrome. This would be a new rash/symptom. I do not see a note for dermatology, could we get the records from where she was seen. Unfortunately I'm only in [place] one day a week so I do not have anything else available at that location.     Saranya Law, PAC

## 2023-11-15 NOTE — TELEPHONE ENCOUNTER
Health Call Center    Phone Message    May a detailed message be left on voicemail: yes     Reason for Call: Symptoms or Concerns     Current symptom or concern: flare up, pt broke out with redness and scabs. Pt saw Dermatology and they told her to see Saranya soon.     Symptoms have been present for:  2 month(s)    Are there any new or worsening symptoms? Yes: Pt states the redness has gotten worse. Pt was put on prednisone by Derm.     Pt would like to see Saranya soon but next available at [place] is in Jan. Pt declined to go to [place] location.    Please call pt back at [phone].     Action Taken: Message routed to:  Other: [facility]    Travel Screening: Not Applicable

## 2023-11-20 NOTE — TELEPHONE ENCOUNTER
Pt states her skin has improved, it is still red and blotchy but the scabs are gone. Pt has an appt on 1/18/24 to see Saranya, dermatology notes are in chart from 11/13/23 visit.

## 2023-11-20 NOTE — TELEPHONE ENCOUNTER
Okay to keep January appointment as scheduled. 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 We can check the labs to evaluate for systemic lupus. If systemic labs are negative, she should continue to follow with derm for the skin. If they are recommending rheumatology treat her skin, we can refer her to the [place] derm team.     Lab orders placed.     Saranya Law PA-C

## 2023-11-20 NOTE — TELEPHONE ENCOUNTER
Pt notified of Saranya's comments below, she will go to [facility] lab to have these labs drawn and will wait to hear back from Saranya on the results and if f/up is needed in Rheum.

## 2023-11-21 ENCOUNTER — LAB (OUTPATIENT)
Dept: LAB | Facility: HOSPITAL | Age: 78
End: 2023-11-21
Payer: COMMERCIAL

## 2023-11-21 DIAGNOSIS — M35.01 SJOGREN'S SYNDROME WITH KERATOCONJUNCTIVITIS SICCA (H): ICD-10-CM

## 2023-11-21 DIAGNOSIS — L93.2 CUTANEOUS LUPUS ERYTHEMATOSUS: ICD-10-CM

## 2023-11-21 LAB
ALBUMIN UR-MCNC: 30 MG/DL
APPEARANCE UR: CLEAR
BACTERIA #/AREA URNS HPF: ABNORMAL /HPF
BILIRUB UR QL STRIP: NEGATIVE
COLOR UR AUTO: YELLOW
CRP SERPL-MCNC: <3 MG/L
ERYTHROCYTE [SEDIMENTATION RATE] IN BLOOD BY WESTERGREN METHOD: 54 MM/HR (ref 0–30)
GLUCOSE UR STRIP-MCNC: NEGATIVE MG/DL
HGB UR QL STRIP: NEGATIVE
HYALINE CASTS: 5 /LPF
KETONES UR STRIP-MCNC: NEGATIVE MG/DL
LEUKOCYTE ESTERASE UR QL STRIP: ABNORMAL
MUCOUS THREADS #/AREA URNS LPF: PRESENT /LPF
NITRATE UR QL: NEGATIVE
PH UR STRIP: 6.5 [PH] (ref 5–7)
RBC URINE: 1 /HPF
SP GR UR STRIP: 1.03 (ref 1–1.03)
UROBILINOGEN UR STRIP-MCNC: 4 MG/DL
WBC URINE: 5 /HPF

## 2023-11-21 PROCEDURE — 86225 DNA ANTIBODY NATIVE: CPT

## 2023-11-21 PROCEDURE — 81001 URINALYSIS AUTO W/SCOPE: CPT

## 2023-11-21 PROCEDURE — 86235 NUCLEAR ANTIGEN ANTIBODY: CPT

## 2023-11-21 PROCEDURE — 86160 COMPLEMENT ANTIGEN: CPT

## 2023-11-21 PROCEDURE — 85652 RBC SED RATE AUTOMATED: CPT

## 2023-11-21 PROCEDURE — 86038 ANTINUCLEAR ANTIBODIES: CPT

## 2023-11-21 PROCEDURE — 36415 COLL VENOUS BLD VENIPUNCTURE: CPT

## 2023-11-21 PROCEDURE — 86140 C-REACTIVE PROTEIN: CPT

## 2023-11-22 LAB
ANA PAT SER IF-IMP: ABNORMAL
ANA SER QL IF: POSITIVE
ANA TITR SER IF: ABNORMAL {TITER}
C3 SERPL-MCNC: 41 MG/DL (ref 81–157)
C4 SERPL-MCNC: 12 MG/DL (ref 13–39)
DSDNA AB SER-ACNC: 3.5 IU/ML
ENA SM IGG SER IA-ACNC: 1.6 U/ML
ENA SM IGG SER IA-ACNC: NEGATIVE
ENA SS-A AB SER IA-ACNC: >240 U/ML
ENA SS-A AB SER IA-ACNC: POSITIVE
ENA SS-B IGG SER IA-ACNC: >320 U/ML
ENA SS-B IGG SER IA-ACNC: POSITIVE
U1 SNRNP IGG SER IA-ACNC: 4.8 U/ML
U1 SNRNP IGG SER IA-ACNC: NEGATIVE

## 2023-11-29 DIAGNOSIS — M35.01 SJOGREN'S SYNDROME WITH KERATOCONJUNCTIVITIS SICCA (H): ICD-10-CM

## 2023-11-29 RX ORDER — PILOCARPINE HYDROCHLORIDE 5 MG/1
5 TABLET, FILM COATED ORAL 2 TIMES DAILY PRN
Qty: 180 TABLET | Refills: 0 | Status: SHIPPED | OUTPATIENT
Start: 2023-11-29 | End: 2024-02-15

## 2023-11-30 ENCOUNTER — HOSPITAL ENCOUNTER (OUTPATIENT)
Dept: GENERAL RADIOLOGY | Facility: HOSPITAL | Age: 78
Discharge: HOME | End: 2023-11-30
Attending: PHYSICIAN ASSISTANT | Admitting: PHYSICIAN ASSISTANT
Payer: COMMERCIAL

## 2023-11-30 ENCOUNTER — LAB (OUTPATIENT)
Dept: LAB | Facility: CLINIC | Age: 78
End: 2023-11-30
Payer: COMMERCIAL

## 2023-11-30 ENCOUNTER — OFFICE VISIT (OUTPATIENT)
Dept: RHEUMATOLOGY | Facility: CLINIC | Age: 78
End: 2023-11-30
Payer: COMMERCIAL

## 2023-11-30 VITALS
BODY MASS INDEX: 23.7 KG/M2 | SYSTOLIC BLOOD PRESSURE: 134 MMHG | DIASTOLIC BLOOD PRESSURE: 72 MMHG | HEART RATE: 81 BPM | HEIGHT: 67 IN | OXYGEN SATURATION: 97 % | WEIGHT: 151 LBS

## 2023-11-30 DIAGNOSIS — L93.2 CUTANEOUS LUPUS ERYTHEMATOSUS: ICD-10-CM

## 2023-11-30 DIAGNOSIS — Z79.899 HIGH RISK MEDICATION USE: ICD-10-CM

## 2023-11-30 DIAGNOSIS — M35.01 SJOGREN'S SYNDROME WITH KERATOCONJUNCTIVITIS SICCA (H): ICD-10-CM

## 2023-11-30 DIAGNOSIS — M35.01 SJOGREN'S SYNDROME WITH KERATOCONJUNCTIVITIS SICCA (H): Primary | ICD-10-CM

## 2023-11-30 LAB
ALT SERPL W P-5'-P-CCNC: 158 U/L (ref 0–50)
AST SERPL W P-5'-P-CCNC: 112 U/L (ref 0–45)
CREAT SERPL-MCNC: 0.77 MG/DL (ref 0.51–0.95)
EGFRCR SERPLBLD CKD-EPI 2021: 80 ML/MIN/1.73M2
ERYTHROCYTE [DISTWIDTH] IN BLOOD BY AUTOMATED COUNT: 14.4 % (ref 10–15)
HCT VFR BLD AUTO: 27 % (ref 35–47)
HGB BLD-MCNC: 9.1 G/DL (ref 11.7–15.7)
MCH RBC QN AUTO: 29.9 PG (ref 26.5–33)
MCHC RBC AUTO-ENTMCNC: 33.7 G/DL (ref 31.5–36.5)
MCV RBC AUTO: 89 FL (ref 78–100)
PLATELET # BLD AUTO: 195 10E3/UL (ref 150–450)
RBC # BLD AUTO: 3.04 10E6/UL (ref 3.8–5.2)
WBC # BLD AUTO: 7.8 10E3/UL (ref 4–11)

## 2023-11-30 PROCEDURE — 84450 TRANSFERASE (AST) (SGOT): CPT

## 2023-11-30 PROCEDURE — 82570 ASSAY OF URINE CREATININE: CPT

## 2023-11-30 PROCEDURE — 82565 ASSAY OF CREATININE: CPT

## 2023-11-30 PROCEDURE — 36415 COLL VENOUS BLD VENIPUNCTURE: CPT

## 2023-11-30 PROCEDURE — 84460 ALANINE AMINO (ALT) (SGPT): CPT

## 2023-11-30 PROCEDURE — 82043 UR ALBUMIN QUANTITATIVE: CPT

## 2023-11-30 PROCEDURE — 73130 X-RAY EXAM OF HAND: CPT | Mod: 50

## 2023-11-30 PROCEDURE — 85027 COMPLETE CBC AUTOMATED: CPT

## 2023-11-30 PROCEDURE — 99214 OFFICE O/P EST MOD 30 MIN: CPT | Performed by: PHYSICIAN ASSISTANT

## 2023-11-30 RX ORDER — CYCLOSPORINE 0.5 MG/ML
1 EMULSION OPHTHALMIC 2 TIMES DAILY
COMMUNITY
Start: 2023-10-27

## 2023-11-30 RX ORDER — PREDNISONE 10 MG/1
10 TABLET ORAL 4 TIMES DAILY
COMMUNITY
Start: 2023-11-13 | End: 2023-12-12

## 2023-11-30 ASSESSMENT — PAIN SCALES - GENERAL: PAINLEVEL: NO PAIN (0)

## 2023-12-01 LAB
CREAT UR-MCNC: 85.9 MG/DL
MICROALBUMIN UR-MCNC: 23 MG/L
MICROALBUMIN/CREAT UR: 26.78 MG/G CR (ref 0–25)

## 2023-12-03 DIAGNOSIS — M79.18 MYOFASCIAL PAIN: ICD-10-CM

## 2023-12-04 ENCOUNTER — RADIOLOGY INJECTION OFFICE VISIT (OUTPATIENT)
Dept: PALLIATIVE MEDICINE | Facility: OTHER | Age: 78
End: 2023-12-04
Attending: PAIN MEDICINE
Payer: COMMERCIAL

## 2023-12-04 VITALS — DIASTOLIC BLOOD PRESSURE: 74 MMHG | OXYGEN SATURATION: 89 % | SYSTOLIC BLOOD PRESSURE: 157 MMHG | HEART RATE: 90 BPM

## 2023-12-04 DIAGNOSIS — Z98.890 H/O CERVICAL SPINE SURGERY: ICD-10-CM

## 2023-12-04 DIAGNOSIS — Z79.2 PROPHYLACTIC ANTIBIOTIC: Primary | ICD-10-CM

## 2023-12-04 DIAGNOSIS — M47.812 ARTHROPATHY OF CERVICAL FACET JOINT: ICD-10-CM

## 2023-12-04 DIAGNOSIS — M79.18 MYOFASCIAL PAIN: ICD-10-CM

## 2023-12-04 PROCEDURE — 258N000003 HC RX IP 258 OP 636: Performed by: PAIN MEDICINE

## 2023-12-04 PROCEDURE — 250N000009 HC RX 250: Performed by: PAIN MEDICINE

## 2023-12-04 PROCEDURE — 64555 IMPLANT NEUROELECTRODES: CPT | Performed by: PAIN MEDICINE

## 2023-12-04 PROCEDURE — C1778 LEAD, NEUROSTIMULATOR: HCPCS

## 2023-12-04 PROCEDURE — 250N000011 HC RX IP 250 OP 636: Performed by: PAIN MEDICINE

## 2023-12-04 RX ORDER — BACLOFEN 10 MG/1
5-10 TABLET ORAL 2 TIMES DAILY PRN
Qty: 180 TABLET | Refills: 1 | Status: SHIPPED | OUTPATIENT
Start: 2023-12-04 | End: 2024-09-23

## 2023-12-04 RX ADMIN — LIDOCAINE HYDROCHLORIDE 5 ML: 10 INJECTION, SOLUTION EPIDURAL; INFILTRATION; INTRACAUDAL; PERINEURAL at 11:35

## 2023-12-04 RX ADMIN — CEFAZOLIN 2 G: 1 INJECTION, POWDER, FOR SOLUTION INTRAMUSCULAR; INTRAVENOUS at 10:52

## 2023-12-04 ASSESSMENT — PAIN SCALES - GENERAL
PAINLEVEL: MODERATE PAIN (5)
PAINLEVEL: MODERATE PAIN (5)

## 2023-12-04 NOTE — PATIENT INSTRUCTIONS
POST PROCEDURE INSTRUCTIONS      PAIN CENTER  PROCEDURE DONE TODAY: PERIPHERAL NERVE STIMULATOR  Rest today.   Patient demonstrates the ability to ambulate independently with a steady gait at the time of discharge.    It is not unusual to have a Temporary increase in your pain after a procedure.  You can use ice to the area but do not get the dressing or equipment wet- sponge baths only during the trial period.  Reinforce the dressing as needed but do not remove it.  Do not bend or twist near the area or reach over your head for 10 days.   Call if temp is 100 or greater, and/or for redness/warmth/swelling or increased drainage.      Monitor you response to the stimulator      CALL THE [facility]  [phone] IF ANY QUESTIONS    Call [phone] if you need to schedule or change your appointment

## 2023-12-04 NOTE — PROGRESS NOTES
24 gauge Peripheral IV inserted into right anticubital - attempts: 1    Ancef 2 grams given via IV in 200 ml saline     PNS kit:   [id number]  [id number]    IV discontinued: 11:27 AM  Clean/dry/intact  Dressing placed, pressure bandage placed

## 2023-12-04 NOTE — PROGRESS NOTES
Pre-procedure Intake  If YES to any questions or NO to having a   Please complete laminated checklist and leave on the computer keyboard for Provider, verbally inform provider if able.    For SCS Trial, RFA's or any sedation procedure:  Have you been fasting? {YES/NO:985117}  If yes, for how long? ***    Are you taking any any blood thinners such as Coumadin, Warfarin, Jantoven, Pradaxa Xarelto, Eliquis, Edoxaban, Enoxaparin, Lovenox, Heparin, Arixtra, Fondaparinux, or Fragmin? OR Antiplatelet medication such as Plavix, Brilinta, or Effient?   No   If yes, when did you take your last dose? no    Do you take aspirin?  No  If cervical procedure, have you held aspirin for 6 days?   NA    Do you have any allergies to contrast dye, iodine, steroid and/or numbing medications?  NO    Are you currently taking antibiotics or have an active infection?  NO    Have you had a fever/elevated temperature within the past week? NO    Are you currently taking oral steroids? YES: was okay    Do you have a ? Yes    Are you pregnant or breastfeeding?  NO    Have you received the COVID-19 vaccine? Yes  If yes, was it your 1st, 2nd or only dose needed? 2 and boosters  Date of most recent vaccine: 4/3/23    Notify provider and RNs if systolic BP >170, diastolic BP >100, P >100 or O2 sats < 90%
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
## 2023-12-12 ENCOUNTER — OFFICE VISIT (OUTPATIENT)
Dept: FAMILY MEDICINE | Facility: CLINIC | Age: 78
End: 2023-12-12
Payer: COMMERCIAL

## 2023-12-12 VITALS
HEART RATE: 85 BPM | WEIGHT: 157.1 LBS | HEIGHT: 67 IN | BODY MASS INDEX: 24.66 KG/M2 | OXYGEN SATURATION: 99 % | SYSTOLIC BLOOD PRESSURE: 134 MMHG | DIASTOLIC BLOOD PRESSURE: 68 MMHG | RESPIRATION RATE: 18 BRPM | TEMPERATURE: 98.2 F

## 2023-12-12 DIAGNOSIS — I10 ESSENTIAL HYPERTENSION: ICD-10-CM

## 2023-12-12 DIAGNOSIS — Z98.84 S/P GASTRIC BYPASS: ICD-10-CM

## 2023-12-12 DIAGNOSIS — F41.1 ANXIETY STATE: ICD-10-CM

## 2023-12-12 DIAGNOSIS — M35.01 SJOGREN'S SYNDROME WITH KERATOCONJUNCTIVITIS SICCA (H): ICD-10-CM

## 2023-12-12 DIAGNOSIS — H26.9 CATARACT OF BOTH EYES, UNSPECIFIED CATARACT TYPE: ICD-10-CM

## 2023-12-12 DIAGNOSIS — R05.3 CHRONIC COUGH: ICD-10-CM

## 2023-12-12 DIAGNOSIS — Z01.818 PREOP GENERAL PHYSICAL EXAM: Primary | ICD-10-CM

## 2023-12-12 PROCEDURE — 99214 OFFICE O/P EST MOD 30 MIN: CPT | Mod: 24 | Performed by: FAMILY MEDICINE

## 2023-12-12 ASSESSMENT — PAIN SCALES - GENERAL: PAINLEVEL: NO PAIN (1)

## 2023-12-12 NOTE — PROGRESS NOTES
[facility]  [address]  [facility] [address]  Phone: [phone]  Fax: [phone]  Primary Provider: Raji Zhou  Pre-op Performing Provider: RAJI ZHOU      PREOPERATIVE EVALUATION:  Today's date: 12/12/2023    Carolina is a 78 year old, presenting for the following:  Recheck Medication and Pre-Op Exam      Surgical Information:  Surgery/Procedure: cataract surgery   Surgery Location: [facility]  Surgeon: Dr Schoenberg  Surgery Date: 12/27/23, 1/12/24  Time of Surgery: tbd  Where patient plans to recover: At home with family  Fax number for surgical facility: [phone]    Assessment & Plan     The proposed surgical procedure is considered LOW risk.    There are no diagnoses linked to this encounter.          - No identified additional risk factors other than previously addressed    Antiplatelet or Anticoagulation Medication Instructions:   - Patient is on no antiplatelet or anticoagulation medications.    Additional Medication Instructions:  Patient is to take all scheduled medications on the day of surgery    RECOMMENDATION:  APPROVAL GIVEN to proceed with proposed procedure, without further diagnostic evaluation.    Subjective       HPI related to upcoming procedure:     She has bilateral cataracts and needs surgical intervention to restore vision.    Medical history is significant for Sjogren's syndrome, cutaneous lupus, history of gastric bypass surgery, hypertension, elevated liver function test with steatosis and cirrhosis followed by gastroenterology, osteoarthritis, osteopenia, acid reflux, anxiety and chronic cough.    She does not have any signs or symptoms of an acute illness, she does still deal with a chronic cough that is unchanged.    She has no personal history of blood clots, reactions to anesthesia or other problems.  There is no known family history of blood clots.  No concern is identified in my review of her history or after conversing with  her and examining her that would necessitate any further action before proceeding with this procedure.                12/12/2023     3:07 PM   Preop Questions   1. Have you ever had a heart attack or stroke? No   2. Have you ever had surgery on your heart or blood vessels, such as a stent placement, a coronary artery bypass, or surgery on an artery in your head, neck, heart, or legs? No   3. Do you have chest pain with activity? No   4. Do you have a history of  heart failure? No   5. Do you currently have a cold, bronchitis or symptoms of other infection? No   6. Do you have a cough, shortness of breath, or wheezing? YES -she is a chronic cough no acute cough or suggestion of an acute infection.   7. Do you or anyone in your family have previous history of blood clots? YES -mother and sister with history of blood clots, no known family history of blood clotting disorder.  Patient has never been tested for blood clotting disorder.   8. Do you or does anyone in your family have a serious bleeding problem such as prolonged bleeding following surgeries or cuts? No   9. Have you ever had problems with anemia or been told to take iron pills? No   10. Have you had any abnormal blood loss such as black, tarry or bloody stools, or abnormal vaginal bleeding? No   11. Have you ever had a blood transfusion? YES -    11a. Have you ever had a transfusion reaction? No   12. Are you willing to have a blood transfusion if it is medically needed before, during, or after your surgery? Yes   13. Have you or any of your relatives ever had problems with anesthesia? No   14. Do you have sleep apnea, excessive snoring or daytime drowsiness? No   15. Do you have any artifical heart valves or other implanted medical devices like a pacemaker, defibrillator, or continuous glucose monitor? No   16. Do you have artificial joints? YES -    17. Are you allergic to latex? No       Health Care Directive:  Patient does not have a Health Care  Directive or Living Will:     Preoperative Review of :   reviewed - no record of controlled substances prescribed.      Status of Chronic Conditions:  See problem list for active medical problems.  Problems all longstanding and stable, except as noted/documented.  See ROS for pertinent symptoms related to these conditions.    Review of Systems  Complete review of systems is obtained.  Other than the specific considerations noted above complete review of systems is negative.      Patient Active Problem List    Diagnosis Date Noted    Cirrhosis of liver (H) 09/26/2023     Priority: Medium    Cervical spine pain 07/14/2023     Priority: Medium    Myofascial pain 07/14/2023     Priority: Medium    Arthropathy of cervical facet joint 07/14/2023     Priority: Medium    H/O cervical spine surgery 07/14/2023     Priority: Medium    Nonalcoholic steatohepatitis 06/07/2021     Priority: Medium    Nausea and vomiting 04/29/2021     Priority: Medium    Abnormal liver function tests 04/29/2021     Priority: Medium    Gastritis 10/13/2020     Priority: Medium     Created by Conversion      Nonspecific abdominal pain 10/09/2020     Priority: Medium    Precordial chest pain      Priority: Medium    Abnormal nuclear stress test 08/15/2019     Priority: Medium     Added automatically from request for surgery [id number]        Localized primary carpometacarpal osteoarthritis 10/24/2018     Priority: Medium    Sjogren's syndrome (H24) 08/20/2018     Priority: Medium    Primary osteoarthritis involving multiple joints 08/20/2018     Priority: Medium    THEE positive 08/20/2018     Priority: Medium    Anxiety      Priority: Medium     Created by Conversion  Replacement Utility updated for latest IMO load        Hypertension      Priority: Medium     Created by Conversion  Replacement Utility updated for latest IMO load        Rotator Cuff Tendonitis      Priority: Medium     Created by Conversion  Replacement Utility updated for latest  IMO load        Allergic Rhinitis      Priority: Medium     Created by Conversion  Replacement Utility updated for latest IMO load        Vitamin D Deficiency      Priority: Medium     Created by Conversion  Replacement Utility updated for latest IMO load        Menopause Has Occurred      Priority: Medium     Created by Conversion  Replacement Utility updated for latest IMO load        Hypocomplementemia (H) 01/13/2016     Priority: Medium    Subacute cutaneous lupus erythematosus 10/13/2015     Priority: Medium    Cervical radiculopathy at C5 06/17/2015     Priority: Medium    Myelopathy, spondylogenic, cervical 06/17/2015     Priority: Medium    Lower Back Pain Chronic      Priority: Medium     Created by Conversion        Diverticular disease of colon 09/23/2014     Priority: Medium      Past Medical History:   Diagnosis Date    GERD (gastroesophageal reflux disease)     Hypertension     Neck pain     Osteoarthritis 1/13/2016    Systemic lupus (H)      Past Surgical History:   Procedure Laterality Date    ARTHROPLASTY KNEE BILATERAL      BIOPSY BREAST Right 2005    benign    CV CORONARY ANGIOGRAM N/A 8/22/2019    Procedure: Coronary Angiogram;  Surgeon: Melissa Vasquez MD;  Location: [facility] Cath Lab;  Service: Cardiology    CV LEFT HEART CATHETERIZATION WITHOUT LEFT VENTRICULOGRAM Left 8/22/2019    Procedure: Left Heart Catheterization Without Left Ventriculogram;  Surgeon: Melissa Vasquez MD;  Location: [facility] Cath Lab;  Service: Cardiology    FISSURECTOMY RECTUM      HC REMOVAL GALLBLADDER      Description: Cholecystectomy;  Recorded: 04/16/2012;    HYSTERECTOMY  1986    MD TOT DISC ARTHRP ART DISC ANT APPRO 1 NTRSPC CRV N/A 6/17/2015    Procedure:  C45 MOBI-C DISC ARTHORPLASTY, ATTEMPT MOBI C C56, ANTERIOR CERVICAL DISCECTOMY AND FUSION IF UNABLE TO PLACE ;  Surgeon: Korina Beltrán MD;  Location: [facility] OR;  Service: Spine    ZZC GASTROPLASTY,OBESITY,OTHER      Description: Gastric  Surgery For Morbid Obesity Gastric Stapling;  Recorded: 04/16/2012;     Current Outpatient Medications   Medication Sig Dispense Refill    acetaminophen (TYLENOL) 500 MG tablet Take 500 mg by mouth 2 times daily      amLODIPine (NORVASC) 5 MG tablet TAKE 1 TABLET BY MOUTH EVERY DAY 90 tablet 3    baclofen (LIORESAL) 10 MG tablet TAKE 0.5-1 TABLETS (5-10 MG) BY MOUTH 2 TIMES DAILY AS NEEDED FOR MUSCLE SPASMS 180 tablet 1    CARBOXYMETHYLCELL/HYPROMELLOSE (GENTEAL GEL OPHT) [CARBOXYMETHYLCELL/HYPROMELLOSE (GENTEAL GEL OPHT)] Apply 2 drops to eye as needed.      citalopram (CELEXA) 20 MG tablet TAKE 1 & 1/2 TABLETS BY MOUTH ONCE A  tablet 1    diclofenac sodium (VOLTAREN) 1 % Gel [DICLOFENAC SODIUM (VOLTAREN) 1 % GEL] Apply 2 g topically daily as needed.      fluocinonide (LIDEX) 0.05 % external cream Apply topically 2 times daily      ibuprofen (ADVIL/MOTRIN) 200 MG tablet Take 200 mg by mouth every 4 hours as needed for pain      loratadine (CLARITIN) 10 mg tablet [LORATADINE (CLARITIN) 10 MG TABLET] Take 10 mg by mouth daily.      losartan (COZAAR) 100 MG tablet TAKE 1 TABLET BY MOUTH EVERY DAY 90 tablet 3    multivitamin therapeutic tablet [MULTIVITAMIN THERAPEUTIC TABLET] Take 1 tablet by mouth daily.      mupirocin (BACTROBAN) 2 % external ointment Apply topically 2 times daily 30 g 0    OMEGA-3/DHA/EPA/FISH OIL (FISH OIL-OMEGA-3 FATTY ACIDS) 300-1,000 mg capsule [OMEGA-3/DHA/EPA/FISH OIL (FISH OIL-OMEGA-3 FATTY ACIDS) 300-1,000 MG CAPSULE] Take 2 g by mouth daily.      omeprazole (PRILOSEC) 20 MG capsule [OMEPRAZOLE (PRILOSEC) 20 MG CAPSULE] Take 2 capsules (40 mg total) by mouth 2 (two) times a day before meals. 120 capsule 1    pilocarpine (SALAGEN) 5 MG tablet TAKE 1 TABLET (5 MG) BY MOUTH 2 TIMES DAILY AS NEEDED REMINDER OF DUE FOR FOLLOW UP IN SEPT 180 tablet 0    polyvinyl alcohol (LIQUIFILM TEARS) 1.4 % ophthalmic solution [POLYVINYL ALCOHOL (LIQUIFILM TEARS) 1.4 % OPHTHALMIC SOLUTION] Administer 1  "drop to both eyes as needed for dry eyes.      RESTASIS MULTIDOSE 0.05 % ophthalmic emulsion Place 1 drop into both eyes 2 times daily      predniSONE (DELTASONE) 10 MG tablet Take 10 mg by mouth 4 times daily (Patient not taking: Reported on 12/12/2023)         Allergies   Allergen Reactions    Hydroxychloroquine Shortness Of Breath    Celecoxib GI Disturbance    Codeine Unknown     Emesis, tired    Egg White [Egg White (Egg Protein)] Unknown     Dumping syndrome        Social History     Tobacco Use    Smoking status: Never    Smokeless tobacco: Never   Substance Use Topics    Alcohol use: Yes     Comment: Alcoholic Drinks/day: glass of wine on occasion     Family History   Problem Relation Age of Onset    Breast Cancer Sister 52.00    Cancer Sister     Breast Cancer Sister 60.00     History   Drug Use No         Objective     /68   Pulse 85   Temp 98.2  F (36.8  C) (Oral)   Resp 18   Ht 1.702 m (5' 7\")   Wt 71.3 kg (157 lb 1.6 oz)   SpO2 99%   BMI 24.61 kg/m      Wt Readings from Last 3 Encounters:   12/12/23 71.3 kg (157 lb 1.6 oz)   11/30/23 68.5 kg (151 lb)   10/03/23 67.5 kg (148 lb 14.4 oz)        BP Readings from Last 6 Encounters:   12/12/23 134/68   12/04/23 (!) 157/74   11/30/23 134/72   10/06/23 (!) 142/77   10/03/23 124/66   09/25/23 120/70        Hemoglobin A1C   Date Value Ref Range Status   10/03/2023 5.4 0.0 - 5.6 % Final     Comment:     Normal <5.7%   Prediabetes 5.7-6.4%    Diabetes 6.5% or higher     Note: Adopted from ADA consensus guidelines.      Physical Exam        General Appearance:    Alert, cooperative, no distress   Eyes:   No scleral icterus or conjunctival irritation       Ears:    Normal TM's and external ear canals, both ears   Throat:   Lips, mucosa, and tongue normal; teeth and gums normal   Neck:   Supple, symmetrical, trachea midline, no adenopathy;        thyroid:  No enlargement/tenderness/nodules   Lungs:     Clear to auscultation bilaterally, respirations " unlabored, no wheezes or crackles   Heart:    Regular rate and rhythm,  No murmur   Abdomen:    Soft, no distention, no tenderness on palpation, no masses, no organomegaly     Extremities:  No edema, no joint swelling or redness, no evidence of any injuries   Skin: She has a rash consistent with cutaneous lupus on the arms, no concern about infection   Neurologic:  On gross examination there is no motor or sensory deficit.  Patient walks with a normal gait       Recent Labs   Lab Test 11/30/23  1251 10/03/23  0928 02/07/23  1122   HGB 9.1*  --  11.1*     --  149*   INR  --  1.21*  --    CR 0.77  --  0.89   A1C  --  5.4  --         Diagnostics:  No labs were ordered during this visit.   No EKG required for low risk surgery (cataract, skin procedure, breast biopsy, etc).    Revised Cardiac Risk Index (RCRI):  The patient has the following serious cardiovascular risks for perioperative complications:   - No serious cardiac risks = 0 points     RCRI Interpretation: 0 points: Class I (very low risk - 0.4% complication rate)         Signed Electronically by: Rich Kelly MD, MD  Copy of this evaluation report is provided to requesting physician.

## 2023-12-20 ENCOUNTER — OFFICE VISIT (OUTPATIENT)
Dept: PHYSICAL MEDICINE AND REHAB | Facility: CLINIC | Age: 78
End: 2023-12-20
Payer: COMMERCIAL

## 2023-12-20 VITALS — HEART RATE: 112 BPM | SYSTOLIC BLOOD PRESSURE: 130 MMHG | DIASTOLIC BLOOD PRESSURE: 70 MMHG

## 2023-12-20 DIAGNOSIS — M47.812 ARTHROPATHY OF CERVICAL FACET JOINT: Primary | ICD-10-CM

## 2023-12-20 DIAGNOSIS — M79.18 MYOFASCIAL PAIN: ICD-10-CM

## 2023-12-20 DIAGNOSIS — Z98.890 H/O CERVICAL SPINE SURGERY: ICD-10-CM

## 2023-12-20 PROCEDURE — 99213 OFFICE O/P EST LOW 20 MIN: CPT | Performed by: PAIN MEDICINE

## 2023-12-20 ASSESSMENT — PAIN SCALES - GENERAL: PAINLEVEL: SEVERE PAIN (7)

## 2023-12-20 NOTE — LETTER
12/20/2023         RE: Carolina García  [address] [facility] [address]        Dear Colleague,    Thank you for referring your patient, Carolina García, to the [facility]. Please see a copy of my visit note below.      Assessment:     Diagnoses and all orders for this visit:  Arthropathy of cervical facet joint  H/O cervical spine surgery  Myofascial pain     Carolina García is a 78 year old y.o. female with past medical history significant for hypertension, reflux, SLE, status post cervical disc arthroplasty presumed at C5-6 who presents today for follow-up regarding left-sided neck pain:     -Left C2 peripheral nerve stimulator placed on 12/4/2023.  Patient notes 70% relief with her peripheral nerve stimulator until yesterday when pain worsened.  It is still doing better than it was prior to peripheral nerve stimulator.    Plan:     A shared decision making plan was used.  The patient's values and choices were respected. Prior medical records from Dr. Shaffer last visit on 12/4/2023 were reviewed today. The following represents what was discussed and decided upon by the provider and the patient.     -DIAGNOSTIC TESTS: Images were personally reviewed and interpreted.   -- MRI of cervical spine dated 7/21/2023 is personally reviewed images interpreted and discussed with the patient.  Does show an artificial disc replacement at C4-5 with interbody fusion device at C5-6.  There is moderate to severe right and moderate left foraminal stenosis at C5-6.  There is also cranial vertebral junction degenerative changes at C1-C2.     -INTERVENTIONS: No interventions at this time.    -MEDICATIONS: No changes to medications.  -  Discussed side effects of medications and proper use. Patient verbalized understanding.    -PHYSICAL THERAPY: Recommended she continue with home exercises on a consistent basis.  Discussed the importance of core strengthening, ROM, stretching exercises with the  patient and how each of these entities is important in decreasing pain.  Explained to the patient that the purpose of physical therapy is to teach the patient a home exercise program.  These exercises need to be performed every day in order to decrease pain and prevent future occurrences of pain.        -PATIENT EDUCATION: We discussed pain management in a multiple modal fashion including physical therapy, medication management, peripheral nerve stimulator.    -FOLLOW UP: Patient will follow-up with Ashlee Mera on 2/2/2024 for peripheral nerve stimulator removal.  Thereafter she will follow-up with Dr. Shaffer 6 weeks after removal.  Advised to contact clinic if symptoms worsen or change.    Subjective:     Carolina García is a 78 year old female who presents today for follow-up regarding left-sided neck pain.  Patient notes 70% relief with her peripheral nerve stimulator.  She notes yesterday it started worsening and attributes this to more stress with her  in the hospital.  She is hopeful that I will start working better again.  Her pain is worse with turning her head and improved with the stimulator.  She is currently taking ibuprofen in the morning and finds that it is helpful.  She denies any bowel or bladder changes, fevers, chills, unintentional weight loss.    No myelopathic symptoms.     Evaluation to Date: MRI of cervical spine dated 7/21/2023.    Treatment to Date: C2 medial branch peripheral nerve stimulator placed on 12/4/2023.    Patient Active Problem List   Diagnosis     Anxiety     Hypertension     Rotator Cuff Tendonitis     Gastritis     Allergic Rhinitis     Lower Back Pain Chronic     Vitamin D Deficiency     Menopause Has Occurred     Cervical radiculopathy at C5     Myelopathy, spondylogenic, cervical     Subacute cutaneous lupus erythematosus     Hypocomplementemia (H)     Sjogren's syndrome (H24)     Primary osteoarthritis involving multiple joints     THEE positive     Localized  primary carpometacarpal osteoarthritis     Abnormal nuclear stress test     Precordial chest pain     Nonspecific abdominal pain     Nausea and vomiting     Diverticular disease of colon     Abnormal liver function tests     Cervical spine pain     Myofascial pain     Arthropathy of cervical facet joint     H/O cervical spine surgery     Cirrhosis of liver (H)     Nonalcoholic steatohepatitis       Current Outpatient Medications   Medication     acetaminophen (TYLENOL) 500 MG tablet     amLODIPine (NORVASC) 5 MG tablet     baclofen (LIORESAL) 10 MG tablet     CARBOXYMETHYLCELL/HYPROMELLOSE (GENTEAL GEL OPHT)     citalopram (CELEXA) 20 MG tablet     diclofenac sodium (VOLTAREN) 1 % Gel     fluocinonide (LIDEX) 0.05 % external cream     ibuprofen (ADVIL/MOTRIN) 200 MG tablet     loratadine (CLARITIN) 10 mg tablet     losartan (COZAAR) 100 MG tablet     multivitamin therapeutic tablet     mupirocin (BACTROBAN) 2 % external ointment     OMEGA-3/DHA/EPA/FISH OIL (FISH OIL-OMEGA-3 FATTY ACIDS) 300-1,000 mg capsule     omeprazole (PRILOSEC) 20 MG capsule     pilocarpine (SALAGEN) 5 MG tablet     polyvinyl alcohol (LIQUIFILM TEARS) 1.4 % ophthalmic solution     RESTASIS MULTIDOSE 0.05 % ophthalmic emulsion     Current Facility-Administered Medications   Medication     ceFAZolin (ANCEF) 2 g in sodium chloride 0.9 % 100 mL intermittent infusion       Allergies   Allergen Reactions     Hydroxychloroquine Shortness Of Breath     Celecoxib GI Disturbance     Codeine Unknown     Emesis, tired     Egg White [Egg White (Egg Protein)] Unknown     Dumping syndrome       Past Medical History:   Diagnosis Date     GERD (gastroesophageal reflux disease)      Hypertension      Neck pain      Osteoarthritis 1/13/2016     Systemic lupus (H)         Review of Systems  ROS:  Specifically negative for bowel/bladder dysfunction, balance changes, headache, dizziness, foot drop, fevers, chills, appetite changes, nausea/vomiting, unexplained  weight loss. Otherwise 13 systems reviewed are negative. Please see the patient's intake questionnaire from today for details.    Reviewed Social, Family, Past Medical and Past Surgical history with patient, no significant changes noted since prior visit.     Objective:     /70   Pulse 112     PHYSICAL EXAMINATION:   --CONSTITUTIONAL: Vital signs as above. No acute distress. The patient is well nourished and well groomed.  --PSYCHIATRIC:  The patient is awake, alert, oriented to person, place, time and answering questions appropriately with clear speech. Appropriate mood and affect   --HEENT: Sclera are non-injected.   --SKIN: Skin over the face and neck is clean, dry, intact without rashes.  --RESPIRATORY: Normal rhythm and effort. No abnormal accessory muscle breathing patterns noted.   --GROSS MOTOR: Easily arises from a seated position.   --CERVICAL SPINE: Inspection reveals no evidence of deformity. Range of motion is mildly limited in cervical flexion, extension, lSensation to upper extremities is intact.   --VASCULAR: Warm upper limbs bilaterally.     Imaging of the cervical spine: Cervical spine imaging was reviewed today. The images were shown to the patient and the findings were explained using a spine model.    PAIN Insertion For Peripheral Stimulator Lead    Result Date: 12/4/2023  Preprocedure diagnosis: Cervical facet arthropathy/spondylosis Postprocedure diagnosis cervical facet arthropathy/spondylosis Procedure: left C2 medial branch peripheral nerve stimulator placement  Patient is an 78 year-old female with chronic left-sided neck pain secondary to facet arthropathy.  After the risks, benefits and alternatives were discussed with the patient and consent was obtained, patient was placed in the prone position and padded to foster comfort. Prior to delivery of anesthetics, the painful region was carefully outlined with a marker. Appropriate skin and bony landmarks were identified, and pertinent  vascular structures were located.  The skin overlying the cervical spine was prepped and draped in sterile fashion.  Fluoroscopy was used to identify the spinous process and lamina in the center of the patient s region of pain.  After identifying and marking the intended target along the course of the medial branch nerve, the skin around the planned entry point and the subcutaneous tissues are injected with local anesthetic.  A percutaneous sleeve and stimulating probe lead introduction system were assembled, inserted and advanced along the intended course of the medial branch nerve as it traverses the lamina medial and inferior to the zygapophyseal joint, taking care to maintain the proper depth of insertion as the introducer is advanced under fluoroscopic guidance.  The introducer needle was delivered to a location in proximity to the nerve.  The introducer needle was started at C4-5 and advanced to the C2 level on the left.  Multiple stimulation parameters were used to deliver stimulation to the medial branch nerve in concert with stimulating at multiple positions around the nerve. Nerve target acquisition was confirmed noting generation of paresthesias in the paravertebral regions corresponding to the level being stimulated as well as rhythmic thumping within the multi?di, the latter being further corroborated via palpation. Various electrical parameter combinations were tested, and the lead location was adjusted (physically relocated) until the patient indicated paresthesia or muscle tension overlapping the distribution of the patient s typical region of pain.  The stimulating probe was removed from the introducer and a percutaneous lead was guided through the needle and delivered to a location in similar proximity to the nerve. Final location was verified with electrical stimulation and documented with fluoroscopy  The introducer needle was removed, and the exposed end of the percutaneous lead was attached to  an external stimulator unit. Various electrical parameter combinations were again tested until the patient indicated paresthesia or muscle tension overlapping the distribution of the patient s typical region of pain.  After confirming that lead impedance was in the normal range, the external stimulator unit was detached, the needle was removed, and the lead was anchored at the skin.  The lead was threaded into the connector block and electrical continuity and desired patient response was confirmed.  The connector block was attached to the external stimulator unit.  The site was covered with a sterile occlusive dressing and a fluoroscopic/ultrasound image was taken to document final placement. The patient was observed for stability of vital signs and comfort.     XR Hand Bilateral G/E 3 Views    Result Date: 11/30/2023  EXAM: XR HAND BILATERAL G/E 3 VIEWS LOCATION: [facility] DATE: 11/30/2023 INDICATION: Sjogren's syndrome with keratoconjunctivitis sicca (H24), cutaneous lupus erythematosus, high risk medication use. COMPARISON: None.     IMPRESSION: Advanced degenerative change at the STT joint and 1st CMC joint bilaterally. Degenerative change at the 1st MCP joint bilaterally and multiple MCP and IP joints of both hands. No erosive changes are identified. No evidence for fracture.                    Again, thank you for allowing me to participate in the care of your patient.        Sincerely,        Leonel Arrington, DO

## 2023-12-20 NOTE — PATIENT INSTRUCTIONS
Continue to keep in touch with Vishal.  We will have you follow-up on February 2, 2024 with Ashlee Mera for removal of peripheral nerve stimulator.    ~Please call Nurse Navigation line [phone] with any questions or concerns about your treatment plan, if symptoms worsen and you would like to be seen urgently, or if you have problems controlling bladder and bowel function.

## 2023-12-20 NOTE — PROGRESS NOTES
Assessment:     Diagnoses and all orders for this visit:  Arthropathy of cervical facet joint  H/O cervical spine surgery  Myofascial pain     Carolina García is a 78 year old y.o. female with past medical history significant for hypertension, reflux, SLE, status post cervical disc arthroplasty presumed at C5-6 who presents today for follow-up regarding left-sided neck pain:     -Left C2 peripheral nerve stimulator placed on 12/4/2023.  Patient notes 70% relief with her peripheral nerve stimulator until yesterday when pain worsened.  It is still doing better than it was prior to peripheral nerve stimulator.    Plan:     A shared decision making plan was used.  The patient's values and choices were respected. Prior medical records from Dr. Shaffer last visit on 12/4/2023 were reviewed today. The following represents what was discussed and decided upon by the provider and the patient.     -DIAGNOSTIC TESTS: Images were personally reviewed and interpreted.   -- MRI of cervical spine dated 7/21/2023 is personally reviewed images interpreted and discussed with the patient.  Does show an artificial disc replacement at C4-5 with interbody fusion device at C5-6.  There is moderate to severe right and moderate left foraminal stenosis at C5-6.  There is also cranial vertebral junction degenerative changes at C1-C2.     -INTERVENTIONS: No interventions at this time.    -MEDICATIONS: No changes to medications.  -  Discussed side effects of medications and proper use. Patient verbalized understanding.    -PHYSICAL THERAPY: Recommended she continue with home exercises on a consistent basis.  Discussed the importance of core strengthening, ROM, stretching exercises with the patient and how each of these entities is important in decreasing pain.  Explained to the patient that the purpose of physical therapy is to teach the patient a home exercise program.  These exercises need to be performed every day in order to decrease pain  and prevent future occurrences of pain.        -PATIENT EDUCATION: We discussed pain management in a multiple modal fashion including physical therapy, medication management, peripheral nerve stimulator.    -FOLLOW UP: Patient will follow-up with Ashlee Mera on 2/2/2024 for peripheral nerve stimulator removal.  Thereafter she will follow-up with Dr. Shaffer 6 weeks after removal.  Advised to contact clinic if symptoms worsen or change.    Subjective:     Carolina García is a 78 year old female who presents today for follow-up regarding left-sided neck pain.  Patient notes 70% relief with her peripheral nerve stimulator.  She notes yesterday it started worsening and attributes this to more stress with her  in the hospital.  She is hopeful that I will start working better again.  Her pain is worse with turning her head and improved with the stimulator.  She is currently taking ibuprofen in the morning and finds that it is helpful.  She denies any bowel or bladder changes, fevers, chills, unintentional weight loss.    No myelopathic symptoms.     Evaluation to Date: MRI of cervical spine dated 7/21/2023.    Treatment to Date: C2 medial branch peripheral nerve stimulator placed on 12/4/2023.    Patient Active Problem List   Diagnosis    Anxiety    Hypertension    Rotator Cuff Tendonitis    Gastritis    Allergic Rhinitis    Lower Back Pain Chronic    Vitamin D Deficiency    Menopause Has Occurred    Cervical radiculopathy at C5    Myelopathy, spondylogenic, cervical    Subacute cutaneous lupus erythematosus    Hypocomplementemia (H)    Sjogren's syndrome (H24)    Primary osteoarthritis involving multiple joints    THEE positive    Localized primary carpometacarpal osteoarthritis    Abnormal nuclear stress test    Precordial chest pain    Nonspecific abdominal pain    Nausea and vomiting    Diverticular disease of colon    Abnormal liver function tests    Cervical spine pain    Myofascial pain    Arthropathy of  cervical facet joint    H/O cervical spine surgery    Cirrhosis of liver (H)    Nonalcoholic steatohepatitis       Current Outpatient Medications   Medication    acetaminophen (TYLENOL) 500 MG tablet    amLODIPine (NORVASC) 5 MG tablet    baclofen (LIORESAL) 10 MG tablet    CARBOXYMETHYLCELL/HYPROMELLOSE (GENTEAL GEL OPHT)    citalopram (CELEXA) 20 MG tablet    diclofenac sodium (VOLTAREN) 1 % Gel    fluocinonide (LIDEX) 0.05 % external cream    ibuprofen (ADVIL/MOTRIN) 200 MG tablet    loratadine (CLARITIN) 10 mg tablet    losartan (COZAAR) 100 MG tablet    multivitamin therapeutic tablet    mupirocin (BACTROBAN) 2 % external ointment    OMEGA-3/DHA/EPA/FISH OIL (FISH OIL-OMEGA-3 FATTY ACIDS) 300-1,000 mg capsule    omeprazole (PRILOSEC) 20 MG capsule    pilocarpine (SALAGEN) 5 MG tablet    polyvinyl alcohol (LIQUIFILM TEARS) 1.4 % ophthalmic solution    RESTASIS MULTIDOSE 0.05 % ophthalmic emulsion     Current Facility-Administered Medications   Medication    ceFAZolin (ANCEF) 2 g in sodium chloride 0.9 % 100 mL intermittent infusion       Allergies   Allergen Reactions    Hydroxychloroquine Shortness Of Breath    Celecoxib GI Disturbance    Codeine Unknown     Emesis, tired    Egg White [Egg White (Egg Protein)] Unknown     Dumping syndrome       Past Medical History:   Diagnosis Date    GERD (gastroesophageal reflux disease)     Hypertension     Neck pain     Osteoarthritis 1/13/2016    Systemic lupus (H)         Review of Systems  ROS:  Specifically negative for bowel/bladder dysfunction, balance changes, headache, dizziness, foot drop, fevers, chills, appetite changes, nausea/vomiting, unexplained weight loss. Otherwise 13 systems reviewed are negative. Please see the patient's intake questionnaire from today for details.    Reviewed Social, Family, Past Medical and Past Surgical history with patient, no significant changes noted since prior visit.     Objective:     /70   Pulse 112     PHYSICAL  EXAMINATION:   --CONSTITUTIONAL: Vital signs as above. No acute distress. The patient is well nourished and well groomed.  --PSYCHIATRIC:  The patient is awake, alert, oriented to person, place, time and answering questions appropriately with clear speech. Appropriate mood and affect   --HEENT: Sclera are non-injected.   --SKIN: Skin over the face and neck is clean, dry, intact without rashes.  --RESPIRATORY: Normal rhythm and effort. No abnormal accessory muscle breathing patterns noted.   --GROSS MOTOR: Easily arises from a seated position.   --CERVICAL SPINE: Inspection reveals no evidence of deformity. Range of motion is mildly limited in cervical flexion, extension, lSensation to upper extremities is intact.   --VASCULAR: Warm upper limbs bilaterally.     Imaging of the cervical spine: Cervical spine imaging was reviewed today. The images were shown to the patient and the findings were explained using a spine model.    PAIN Insertion For Peripheral Stimulator Lead    Result Date: 12/4/2023  Preprocedure diagnosis: Cervical facet arthropathy/spondylosis Postprocedure diagnosis cervical facet arthropathy/spondylosis Procedure: left C2 medial branch peripheral nerve stimulator placement  Patient is an 78 year-old female with chronic left-sided neck pain secondary to facet arthropathy.  After the risks, benefits and alternatives were discussed with the patient and consent was obtained, patient was placed in the prone position and padded to foster comfort. Prior to delivery of anesthetics, the painful region was carefully outlined with a marker. Appropriate skin and bony landmarks were identified, and pertinent vascular structures were located.  The skin overlying the cervical spine was prepped and draped in sterile fashion.  Fluoroscopy was used to identify the spinous process and lamina in the center of the patient s region of pain.  After identifying and marking the intended target along the course of the medial  branch nerve, the skin around the planned entry point and the subcutaneous tissues are injected with local anesthetic.  A percutaneous sleeve and stimulating probe lead introduction system were assembled, inserted and advanced along the intended course of the medial branch nerve as it traverses the lamina medial and inferior to the zygapophyseal joint, taking care to maintain the proper depth of insertion as the introducer is advanced under fluoroscopic guidance.  The introducer needle was delivered to a location in proximity to the nerve.  The introducer needle was started at C4-5 and advanced to the C2 level on the left.  Multiple stimulation parameters were used to deliver stimulation to the medial branch nerve in concert with stimulating at multiple positions around the nerve. Nerve target acquisition was confirmed noting generation of paresthesias in the paravertebral regions corresponding to the level being stimulated as well as rhythmic thumping within the multi?di, the latter being further corroborated via palpation. Various electrical parameter combinations were tested, and the lead location was adjusted (physically relocated) until the patient indicated paresthesia or muscle tension overlapping the distribution of the patient s typical region of pain.  The stimulating probe was removed from the introducer and a percutaneous lead was guided through the needle and delivered to a location in similar proximity to the nerve. Final location was verified with electrical stimulation and documented with fluoroscopy  The introducer needle was removed, and the exposed end of the percutaneous lead was attached to an external stimulator unit. Various electrical parameter combinations were again tested until the patient indicated paresthesia or muscle tension overlapping the distribution of the patient s typical region of pain.  After confirming that lead impedance was in the normal range, the external stimulator unit  was detached, the needle was removed, and the lead was anchored at the skin.  The lead was threaded into the connector block and electrical continuity and desired patient response was confirmed.  The connector block was attached to the external stimulator unit.  The site was covered with a sterile occlusive dressing and a fluoroscopic/ultrasound image was taken to document final placement. The patient was observed for stability of vital signs and comfort.     XR Hand Bilateral G/E 3 Views    Result Date: 11/30/2023  EXAM: XR HAND BILATERAL G/E 3 VIEWS LOCATION: [facility] DATE: 11/30/2023 INDICATION: Sjogren's syndrome with keratoconjunctivitis sicca (H24), cutaneous lupus erythematosus, high risk medication use. COMPARISON: None.     IMPRESSION: Advanced degenerative change at the STT joint and 1st CMC joint bilaterally. Degenerative change at the 1st MCP joint bilaterally and multiple MCP and IP joints of both hands. No erosive changes are identified. No evidence for fracture.

## 2023-12-31 ENCOUNTER — OFFICE VISIT (OUTPATIENT)
Dept: FAMILY MEDICINE | Facility: CLINIC | Age: 78
End: 2023-12-31
Payer: COMMERCIAL

## 2023-12-31 VITALS
OXYGEN SATURATION: 99 % | TEMPERATURE: 97.9 F | HEART RATE: 100 BPM | RESPIRATION RATE: 24 BRPM | DIASTOLIC BLOOD PRESSURE: 72 MMHG | SYSTOLIC BLOOD PRESSURE: 145 MMHG

## 2023-12-31 DIAGNOSIS — L93.2 CUTANEOUS LUPUS ERYTHEMATOSUS: Primary | ICD-10-CM

## 2023-12-31 DIAGNOSIS — M79.605 PAIN AND SWELLING OF LEFT LOWER EXTREMITY: ICD-10-CM

## 2023-12-31 DIAGNOSIS — M79.89 PAIN AND SWELLING OF LEFT LOWER EXTREMITY: ICD-10-CM

## 2023-12-31 PROCEDURE — 99215 OFFICE O/P EST HI 40 MIN: CPT | Performed by: PHYSICIAN ASSISTANT

## 2023-12-31 RX ORDER — PREDNISONE 20 MG/1
40 TABLET ORAL DAILY
Qty: 10 TABLET | Refills: 0 | Status: SHIPPED | OUTPATIENT
Start: 2023-12-31 | End: 2024-01-05

## 2023-12-31 NOTE — PROGRESS NOTES
Assessment/Plan:    Advised pt go to the ER to have an ultrasound to rule out DVT due to unilateral lower leg pain & swelling. She declines ER care at this time. Pt advised of risk of death if DVT goes untreated; she expressed understanding.     She would like to be restarted on prednisone for her cutaneous lupus. Rx prednisone, follow up with dermatology in 2 days as planned.     See patient instructions below.    At the end of the encounter, I discussed results, diagnosis, medications. Discussed red flags for immediate return to clinic/ER, as well as indications for follow up if no improvement. Patient understood and agreed to plan. Patient was stable for discharge.      ICD-10-CM    1. Cutaneous lupus erythematosus  L93.2 predniSONE (DELTASONE) 20 MG tablet      2. Pain and swelling of left lower extremity  M79.605     M79.89             No follow-ups on file.    KAROL Arreguin, LATOYA  [facility]  -----------------------------------------------------------------------------------------------------------------------------------------------------    HPI:  Carolina García is a 78 year old female with history of HTN, cirrhosis , & Sjogren's syndrome,who presents for evaluation of L lower leg and ankle swelling & pain onset 2 weeks ago. Symptoms temporarily improved but then worsened again a few days ago. No treatments tried. Patient reports no fever/chills, chest pain, shortness of breath, weight gain, orthopnea, PND. She has no hx of DVT/PE.    Pt also notes being diagnosed with cutaneous lupus recently and was seen by dermatology, prescribed prednisone about 5 weeks ago. She took this and it helped temporarily but the rash has returned in the last few days. It is worst on her arms & hands but is also on her legs now. She is seeing dermatology again in 2 days.     Past Medical History:   Diagnosis Date    GERD (gastroesophageal reflux disease)     Hypertension     Neck pain      Osteoarthritis 1/13/2016    Systemic lupus (H)        Vitals:    12/31/23 1224   BP: (!) 145/72   Pulse: 100   Resp: 24   Temp: 97.9  F (36.6  C)   TempSrc: Oral   SpO2: 99%       Physical Exam  Vitals and nursing note reviewed.   Pulmonary:      Effort: Pulmonary effort is normal.   Musculoskeletal:      Comments: Swelling to L lower leg and ankle with tenderness; nonpitting    Generalized maculopapular rash to bilateral arms/legs   Neurological:      Mental Status: She is alert.         Labs/Imaging:  No results found for this or any previous visit (from the past 24 hour(s)).  No results found for this or any previous visit (from the past 24 hour(s)).        Patient Instructions   I recommend going to the ER to have a DVT ruled out in your left leg.   Try compression sock/stocking and elevation.     Follow up with dermatology Tuesday for your cutaneous lupus. We will restart prednisone today.

## 2023-12-31 NOTE — PATIENT INSTRUCTIONS
I recommend going to the ER to have a DVT ruled out in your left leg.   Try compression sock/stocking and elevation.     Follow up with dermatology Tuesday for your cutaneous lupus. We will restart prednisone today.

## 2024-01-02 ENCOUNTER — TRANSFERRED RECORDS (OUTPATIENT)
Dept: HEALTH INFORMATION MANAGEMENT | Facility: CLINIC | Age: 79
End: 2024-01-02
Payer: COMMERCIAL

## 2024-01-12 ENCOUNTER — TRANSFERRED RECORDS (OUTPATIENT)
Dept: HEALTH INFORMATION MANAGEMENT | Facility: CLINIC | Age: 79
End: 2024-01-12
Payer: COMMERCIAL

## 2024-01-15 ENCOUNTER — TRANSFERRED RECORDS (OUTPATIENT)
Dept: HEALTH INFORMATION MANAGEMENT | Facility: CLINIC | Age: 79
End: 2024-01-15
Payer: COMMERCIAL

## 2024-01-26 NOTE — PROGRESS NOTES
Assessment:   Carolina Curtis a 79 year old femalewith past medical history significant for hypertension, reflux, SLE, status post cervical disc arthroplasty who presents today for peripheral nerve stimulator removal.  Patient is status post a left C2 peripheral nerve stimulator placed December 4, 2023.  Patient reports 60% improvement in pain.  She notes that she is able to turn her neck to the left easier.  - Peripheral nerve stimulator lead was removed without difficulty.  Lead was intact.     PSP: Dr. Sahffer  Plan:      A shared decision making plan was used.  The patient's values and choices were respected.  The following represents what was discussed and decided upon by the physician assistant and the patient.       1.  DIAGNOSTIC TESTS: I reviewed the MRI cervical spine from July 2023.     2.  PHYSICAL THERAPY: No physical therapy was ordered.     3.  MEDICATIONS: No changes made to the patient's medications.    4.  INTERVENTIONS: Peripheral nerve stimulator lead was pulled today.  Please see below for details.       5.  PATIENT EDUCATION: Patient is in agreement the above plan.  All questions were answered.  - I recommended that the patient avoid submerging her neck in water for the next 48 hours.  Otherwise activity as tolerated.     6.  FOLLOW-UP: Patient will follow up with Dr. Shaffer in 4 weeks.  If she has questions or concerns in the meantime, she should not hesitate to call.     Subjective:      Carolina Curtis a 79 year old who presents today for follow-up regarding chronic neck pain.  Patient is status post a left C2 medial branch peripheral nerve stimulator placed December 4, 2023.  Patient reports 60% improvement in pain.  She states that her pain does not reach the severe level it did previously.  She notes that it is easier for her to turn her neck to the left.      She still has mild residual left-sided neck pain.  Denies pain down the arms.  Denies numbness, tingling, weakness.  She  rates her pain today as a 4 out of 10.  Denies any aggravating or alleviating factors to the pain.  Denies any new symptoms when she was last seen.       Review of Systems:  Positive for headache.  Negative for numbness/tingling, weakness, loss of bowel/bladder control, inability to urinate, pain much worse at night, trip/stumble/falls, difficulty swallowing, difficulty with hand skills, fevers, unintentional weight loss.     Objective:   CONSTITUTIONAL:  Vital signs as above.  No acute distress.  The patient is well nourished and well groomed.    PSYCHIATRIC:  The patient is awake, alert, oriented to person, place and time.  The patient is answering questions appropriately with clear speech.  Normal affect.  HEENT: Normocephalic, atraumatic.  Sclera clear.    SKIN: Lead placement site skin is intact.  No significant erythema.  No edema.  VASCULAR: No significant lower extremity edema.  MUSCULOSKELETAL:  Gait is non-antalgic.  Moving all extremities equally.  NEUROLOGICAL:   Cranial nerves II through XII grossly intact.     RESULTS: I reviewed the MRI cervical spine from [facility] dated July 21, 2023.  This shows postoperative changes of an artificial disc at C4-5 and interbody fusion device at C5-6.  At C4-5 there is moderate right foraminal stenosis.  At C5-6 there is severe right and moderate left foraminal stenosis.  There is multilevel facet arthropathy.    Peripheral nerve stimulator lead was removed without difficulty.  Lead was intact.  Bacitracin and a Band-Aid was placed over the lead injection site.

## 2024-01-29 ENCOUNTER — TRANSFERRED RECORDS (OUTPATIENT)
Dept: HEALTH INFORMATION MANAGEMENT | Facility: CLINIC | Age: 79
End: 2024-01-29

## 2024-01-31 ENCOUNTER — OFFICE VISIT (OUTPATIENT)
Dept: PHYSICAL MEDICINE AND REHAB | Facility: CLINIC | Age: 79
End: 2024-01-31
Payer: COMMERCIAL

## 2024-01-31 VITALS
SYSTOLIC BLOOD PRESSURE: 149 MMHG | HEIGHT: 67 IN | BODY MASS INDEX: 24.64 KG/M2 | DIASTOLIC BLOOD PRESSURE: 70 MMHG | WEIGHT: 157 LBS | HEART RATE: 93 BPM

## 2024-01-31 DIAGNOSIS — M47.812 ARTHROPATHY OF CERVICAL FACET JOINT: Primary | ICD-10-CM

## 2024-01-31 DIAGNOSIS — Z98.890 H/O CERVICAL SPINE SURGERY: ICD-10-CM

## 2024-01-31 PROCEDURE — 99213 OFFICE O/P EST LOW 20 MIN: CPT | Performed by: PHYSICIAN ASSISTANT

## 2024-01-31 RX ORDER — PREDNISONE 5 MG/1
30 TABLET ORAL DAILY
COMMUNITY
Start: 2024-01-22

## 2024-01-31 ASSESSMENT — PAIN SCALES - GENERAL: PAINLEVEL: MODERATE PAIN (4)

## 2024-01-31 NOTE — LETTER
1/31/2024         RE: Carolina García  [address] [facility] [address]        Dear Colleague,    Thank you for referring your patient, Carolina García, to the [facility] SPINE [facility] NEUROSUR[facility]ERY. Please see a copy of my visit note below.    Assessment:   Carolina Curtis a 79 year old femalewith past medical history significant for hypertension, reflux, SLE, status post cervical disc arthroplasty who presents today for peripheral nerve stimulator removal.  Patient is status post a left C2 peripheral nerve stimulator placed December 4, 2023.  Patient reports 60% improvement in pain.  She notes that she is able to turn her neck to the left easier.  - Peripheral nerve stimulator lead was removed without difficulty.  Lead was intact.     PSP: Dr. Shaffer  Plan:      A shared decision making plan was used.  The patient's values and choices were respected.  The following represents what was discussed and decided upon by the physician assistant and the patient.       1.  DIAGNOSTIC TESTS: I reviewed the MRI cervical spine from July 2023.     2.  PHYSICAL THERAPY: No physical therapy was ordered.     3.  MEDICATIONS: No changes made to the patient's medications.    4.  INTERVENTIONS: Peripheral nerve stimulator lead was pulled today.  Please see below for details.       5.  PATIENT EDUCATION: Patient is in agreement the above plan.  All questions were answered.  - I recommended that the patient avoid submerging her neck in water for the next 48 hours.  Otherwise activity as tolerated.     6.  FOLLOW-UP: Patient will follow up with Dr. Shaffer in 4 weeks.  If she has questions or concerns in the meantime, she should not hesitate to call.     Subjective:      Carolina Curtis a 79 year old who presents today for follow-up regarding chronic neck pain.  Patient is status post a left C2 medial branch peripheral nerve stimulator placed December 4, 2023.  Patient reports 60% improvement in pain.  She states that  her pain does not reach the severe level it did previously.  She notes that it is easier for her to turn her neck to the left.      She still has mild residual left-sided neck pain.  Denies pain down the arms.  Denies numbness, tingling, weakness.  She rates her pain today as a 4 out of 10.  Denies any aggravating or alleviating factors to the pain.  Denies any new symptoms when she was last seen.       Review of Systems:  Positive for headache.  Negative for numbness/tingling, weakness, loss of bowel/bladder control, inability to urinate, pain much worse at night, trip/stumble/falls, difficulty swallowing, difficulty with hand skills, fevers, unintentional weight loss.     Objective:   CONSTITUTIONAL:  Vital signs as above.  No acute distress.  The patient is well nourished and well groomed.    PSYCHIATRIC:  The patient is awake, alert, oriented to person, place and time.  The patient is answering questions appropriately with clear speech.  Normal affect.  HEENT: Normocephalic, atraumatic.  Sclera clear.    SKIN: Lead placement site skin is intact.  No significant erythema.  No edema.  VASCULAR: No significant lower extremity edema.  MUSCULOSKELETAL:  Gait is non-antalgic.  Moving all extremities equally.  NEUROLOGICAL:   Cranial nerves II through XII grossly intact.     RESULTS: I reviewed the MRI cervical spine from [facility] dated July 21, 2023.  This shows postoperative changes of an artificial disc at C4-5 and interbody fusion device at C5-6.  At C4-5 there is moderate right foraminal stenosis.  At C5-6 there is severe right and moderate left foraminal stenosis.  There is multilevel facet arthropathy.    Peripheral nerve stimulator lead was removed without difficulty.  Lead was intact.  Bacitracin and a Band-Aid was placed over the lead injection site.      Again, thank you for allowing me to participate in the care of your patient.        Sincerely,        Ashlee Mera PA-C

## 2024-02-05 DIAGNOSIS — F41.1 ANXIETY STATE: ICD-10-CM

## 2024-02-05 RX ORDER — CITALOPRAM HYDROBROMIDE 20 MG/1
TABLET ORAL
Qty: 135 TABLET | Refills: 1 | Status: SHIPPED | OUTPATIENT
Start: 2024-02-05 | End: 2024-08-05

## 2024-02-08 ENCOUNTER — TRANSFERRED RECORDS (OUTPATIENT)
Dept: HEALTH INFORMATION MANAGEMENT | Facility: CLINIC | Age: 79
End: 2024-02-08
Payer: COMMERCIAL

## 2024-02-15 DIAGNOSIS — M35.01 SJOGREN'S SYNDROME WITH KERATOCONJUNCTIVITIS SICCA (H): ICD-10-CM

## 2024-02-15 RX ORDER — PILOCARPINE HYDROCHLORIDE 5 MG/1
5 TABLET, FILM COATED ORAL 2 TIMES DAILY
Qty: 180 TABLET | Refills: 0 | Status: SHIPPED | OUTPATIENT
Start: 2024-02-15 | End: 2024-05-14

## 2024-03-07 DIAGNOSIS — I10 ESSENTIAL HYPERTENSION: ICD-10-CM

## 2024-03-07 RX ORDER — LOSARTAN POTASSIUM 100 MG/1
TABLET ORAL
Qty: 90 TABLET | Refills: 3 | Status: SHIPPED | OUTPATIENT
Start: 2024-03-07

## 2024-03-13 ENCOUNTER — OFFICE VISIT (OUTPATIENT)
Dept: PHYSICAL MEDICINE AND REHAB | Facility: CLINIC | Age: 79
End: 2024-03-13
Payer: COMMERCIAL

## 2024-03-13 VITALS — HEART RATE: 107 BPM | DIASTOLIC BLOOD PRESSURE: 61 MMHG | SYSTOLIC BLOOD PRESSURE: 121 MMHG

## 2024-03-13 DIAGNOSIS — M79.18 MYOFASCIAL PAIN: ICD-10-CM

## 2024-03-13 DIAGNOSIS — M54.2 CERVICAL SPINE PAIN: Primary | ICD-10-CM

## 2024-03-13 DIAGNOSIS — Z98.890 H/O CERVICAL SPINE SURGERY: ICD-10-CM

## 2024-03-13 DIAGNOSIS — M47.812 ARTHROPATHY OF CERVICAL FACET JOINT: ICD-10-CM

## 2024-03-13 PROCEDURE — 99213 OFFICE O/P EST LOW 20 MIN: CPT | Performed by: PHYSICAL MEDICINE & REHABILITATION

## 2024-03-13 ASSESSMENT — PAIN SCALES - GENERAL: PAINLEVEL: MODERATE PAIN (5)
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
## 2024-03-13 NOTE — PATIENT INSTRUCTIONS
Continue with your home stretches  You can try Tylenol up to 1000mg three times daily (3000mg per day max)
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
## 2024-03-14 RX ORDER — CHLORHEXIDINE GLUCONATE ORAL RINSE 1.2 MG/ML
SOLUTION DENTAL
COMMUNITY
Start: 2023-04-24 | End: 2024-07-03

## 2024-03-14 RX ORDER — PREDNISOLONE ACETATE 10 MG/ML
SUSPENSION/ DROPS OPHTHALMIC
COMMUNITY
Start: 2023-12-12

## 2024-03-14 RX ORDER — SPIRONOLACTONE 25 MG/1
50 TABLET ORAL DAILY
COMMUNITY

## 2024-03-14 RX ORDER — VALACYCLOVIR HYDROCHLORIDE 1 G/1
1 TABLET, FILM COATED ORAL
COMMUNITY
Start: 2024-02-07

## 2024-03-14 RX ORDER — VARENICLINE 0.03 MG/.05ML
SPRAY NASAL
COMMUNITY
Start: 2024-03-07

## 2024-03-14 RX ORDER — FUROSEMIDE 20 MG
20 TABLET ORAL EVERY MORNING
COMMUNITY
End: 2024-04-22

## 2024-03-14 RX ORDER — KETOROLAC TROMETHAMINE 5 MG/ML
SOLUTION OPHTHALMIC
COMMUNITY
Start: 2023-12-12 | End: 2024-03-28

## 2024-03-16 DIAGNOSIS — M32.9 SYSTEMIC LUPUS ERYTHEMATOSUS (H): Primary | ICD-10-CM

## 2024-03-18 ENCOUNTER — LAB (OUTPATIENT)
Dept: LAB | Facility: HOSPITAL | Age: 79
End: 2024-03-18
Payer: COMMERCIAL

## 2024-03-18 DIAGNOSIS — R63.4 WEIGHT LOSS: ICD-10-CM

## 2024-03-18 DIAGNOSIS — M32.9 SYSTEMIC LUPUS ERYTHEMATOSUS (H): ICD-10-CM

## 2024-03-18 DIAGNOSIS — R53.83 OTHER FATIGUE: ICD-10-CM

## 2024-03-18 LAB
ALBUMIN SERPL BCG-MCNC: 3 G/DL (ref 3.5–5.2)
ALBUMIN UR-MCNC: 30 MG/DL
ALP SERPL-CCNC: 107 U/L (ref 40–150)
ALT SERPL W P-5'-P-CCNC: 182 U/L (ref 0–50)
APPEARANCE UR: ABNORMAL
AST SERPL W P-5'-P-CCNC: 193 U/L (ref 0–45)
BACTERIA #/AREA URNS HPF: ABNORMAL /HPF
BASOPHILS # BLD AUTO: 0 10E3/UL (ref 0–0.2)
BASOPHILS NFR BLD AUTO: 0 %
BILIRUB SERPL-MCNC: 0.9 MG/DL
BILIRUB UR QL STRIP: NEGATIVE
CHOLEST SERPL-MCNC: 151 MG/DL
COLOR UR AUTO: YELLOW
CRP SERPL-MCNC: <3 MG/L
EOSINOPHIL # BLD AUTO: 0 10E3/UL (ref 0–0.7)
EOSINOPHIL NFR BLD AUTO: 0 %
ERYTHROCYTE [DISTWIDTH] IN BLOOD BY AUTOMATED COUNT: 19.2 % (ref 10–15)
ERYTHROCYTE [SEDIMENTATION RATE] IN BLOOD BY WESTERGREN METHOD: 28 MM/HR (ref 0–30)
FASTING STATUS PATIENT QL REPORTED: NO
GLUCOSE UR STRIP-MCNC: NEGATIVE MG/DL
HCT VFR BLD AUTO: 28.6 % (ref 35–47)
HDLC SERPL-MCNC: 61 MG/DL
HGB BLD-MCNC: 9.2 G/DL (ref 11.7–15.7)
HGB UR QL STRIP: ABNORMAL
IMM GRANULOCYTES # BLD: 0 10E3/UL
IMM GRANULOCYTES NFR BLD: 0 %
KETONES UR STRIP-MCNC: NEGATIVE MG/DL
LDLC SERPL CALC-MCNC: 72 MG/DL
LEUKOCYTE ESTERASE UR QL STRIP: ABNORMAL
LYMPHOCYTES # BLD AUTO: 1.2 10E3/UL (ref 0.8–5.3)
LYMPHOCYTES NFR BLD AUTO: 11 %
MCH RBC QN AUTO: 28.1 PG (ref 26.5–33)
MCHC RBC AUTO-ENTMCNC: 32.2 G/DL (ref 31.5–36.5)
MCV RBC AUTO: 88 FL (ref 78–100)
MONOCYTES # BLD AUTO: 0.6 10E3/UL (ref 0–1.3)
MONOCYTES NFR BLD AUTO: 6 %
NEUTROPHILS # BLD AUTO: 9 10E3/UL (ref 1.6–8.3)
NEUTROPHILS NFR BLD AUTO: 83 %
NITRATE UR QL: NEGATIVE
NONHDLC SERPL-MCNC: 90 MG/DL
NRBC # BLD AUTO: 0 10E3/UL
NRBC BLD AUTO-RTO: 0 /100
PH UR STRIP: 7 [PH] (ref 5–7)
PLATELET # BLD AUTO: 267 10E3/UL (ref 150–450)
PROT SERPL-MCNC: 6.4 G/DL (ref 6.4–8.3)
RBC # BLD AUTO: 3.27 10E6/UL (ref 3.8–5.2)
RBC URINE: 140 /HPF
SP GR UR STRIP: 1.02 (ref 1–1.03)
SQUAMOUS EPITHELIAL: 1 /HPF
TRIGL SERPL-MCNC: 89 MG/DL
UROBILINOGEN UR STRIP-MCNC: 4 MG/DL
WBC # BLD AUTO: 10.8 10E3/UL (ref 4–11)
WBC CLUMPS #/AREA URNS HPF: PRESENT /HPF
WBC URINE: >182 /HPF

## 2024-03-18 PROCEDURE — 85025 COMPLETE CBC W/AUTO DIFF WBC: CPT

## 2024-03-18 PROCEDURE — 85652 RBC SED RATE AUTOMATED: CPT

## 2024-03-18 PROCEDURE — 36415 COLL VENOUS BLD VENIPUNCTURE: CPT

## 2024-03-18 PROCEDURE — 84450 TRANSFERASE (AST) (SGOT): CPT

## 2024-03-18 PROCEDURE — 84155 ASSAY OF PROTEIN SERUM: CPT

## 2024-03-18 PROCEDURE — 80061 LIPID PANEL: CPT

## 2024-03-18 PROCEDURE — 86140 C-REACTIVE PROTEIN: CPT

## 2024-03-18 PROCEDURE — 84460 ALANINE AMINO (ALT) (SGPT): CPT

## 2024-03-18 PROCEDURE — 82040 ASSAY OF SERUM ALBUMIN: CPT

## 2024-03-18 PROCEDURE — 84075 ASSAY ALKALINE PHOSPHATASE: CPT

## 2024-03-18 PROCEDURE — 82247 BILIRUBIN TOTAL: CPT

## 2024-03-18 PROCEDURE — 81001 URINALYSIS AUTO W/SCOPE: CPT

## 2024-03-19 ENCOUNTER — OFFICE VISIT (OUTPATIENT)
Dept: FAMILY MEDICINE | Facility: CLINIC | Age: 79
End: 2024-03-19
Payer: COMMERCIAL

## 2024-03-19 VITALS
BODY MASS INDEX: 24.78 KG/M2 | OXYGEN SATURATION: 99 % | DIASTOLIC BLOOD PRESSURE: 60 MMHG | RESPIRATION RATE: 20 BRPM | WEIGHT: 157.9 LBS | SYSTOLIC BLOOD PRESSURE: 124 MMHG | HEIGHT: 67 IN | TEMPERATURE: 98 F | HEART RATE: 90 BPM

## 2024-03-19 DIAGNOSIS — M35.01 SJOGREN'S SYNDROME WITH KERATOCONJUNCTIVITIS SICCA (H): ICD-10-CM

## 2024-03-19 DIAGNOSIS — L93.2 CUTANEOUS LUPUS ERYTHEMATOSUS: ICD-10-CM

## 2024-03-19 DIAGNOSIS — K92.1 BLACK STOOL: ICD-10-CM

## 2024-03-19 DIAGNOSIS — I82.4Z1 ACUTE DEEP VEIN THROMBOSIS (DVT) OF DISTAL VEIN OF RIGHT LOWER EXTREMITY (H): ICD-10-CM

## 2024-03-19 DIAGNOSIS — R63.4 WEIGHT LOSS: ICD-10-CM

## 2024-03-19 DIAGNOSIS — Z98.84 S/P GASTRIC BYPASS: ICD-10-CM

## 2024-03-19 DIAGNOSIS — D64.9 ANEMIA, UNSPECIFIED TYPE: Primary | ICD-10-CM

## 2024-03-19 LAB
BASOPHILS # BLD AUTO: 0 10E3/UL (ref 0–0.2)
BASOPHILS NFR BLD AUTO: 0 %
EOSINOPHIL # BLD AUTO: 0 10E3/UL (ref 0–0.7)
EOSINOPHIL NFR BLD AUTO: 0 %
ERYTHROCYTE [DISTWIDTH] IN BLOOD BY AUTOMATED COUNT: 19.4 % (ref 10–15)
HCT VFR BLD AUTO: 27.3 % (ref 35–47)
HGB BLD-MCNC: 8.8 G/DL (ref 11.7–15.7)
IMM GRANULOCYTES # BLD: 0 10E3/UL
IMM GRANULOCYTES NFR BLD: 0 %
LYMPHOCYTES # BLD AUTO: 0.5 10E3/UL (ref 0.8–5.3)
LYMPHOCYTES NFR BLD AUTO: 5 %
MCH RBC QN AUTO: 28.5 PG (ref 26.5–33)
MCHC RBC AUTO-ENTMCNC: 32.2 G/DL (ref 31.5–36.5)
MCV RBC AUTO: 88 FL (ref 78–100)
MONOCYTES # BLD AUTO: 0.3 10E3/UL (ref 0–1.3)
MONOCYTES NFR BLD AUTO: 2 %
NEUTROPHILS # BLD AUTO: 9.9 10E3/UL (ref 1.6–8.3)
NEUTROPHILS NFR BLD AUTO: 93 %
NRBC # BLD AUTO: 0 10E3/UL
NRBC BLD AUTO-RTO: 0 /100
PLATELET # BLD AUTO: 263 10E3/UL (ref 150–450)
RBC # BLD AUTO: 3.09 10E6/UL (ref 3.8–5.2)
WBC # BLD AUTO: 10.7 10E3/UL (ref 4–11)

## 2024-03-19 PROCEDURE — 36415 COLL VENOUS BLD VENIPUNCTURE: CPT | Performed by: FAMILY MEDICINE

## 2024-03-19 PROCEDURE — 85025 COMPLETE CBC W/AUTO DIFF WBC: CPT | Performed by: FAMILY MEDICINE

## 2024-03-19 PROCEDURE — 83550 IRON BINDING TEST: CPT | Performed by: FAMILY MEDICINE

## 2024-03-19 PROCEDURE — 83540 ASSAY OF IRON: CPT | Performed by: FAMILY MEDICINE

## 2024-03-19 PROCEDURE — 82728 ASSAY OF FERRITIN: CPT | Performed by: FAMILY MEDICINE

## 2024-03-19 PROCEDURE — 99215 OFFICE O/P EST HI 40 MIN: CPT | Performed by: FAMILY MEDICINE

## 2024-03-19 ASSESSMENT — PAIN SCALES - GENERAL: PAINLEVEL: MILD PAIN (2)

## 2024-03-19 NOTE — PROGRESS NOTES
"Carolina MUNOZ Ricky  /60   Pulse 90   Temp 98  F (36.7  C)   Resp 20   Ht 1.702 m (5' 7\")   Wt 71.6 kg (157 lb 14.4 oz)   SpO2 99%   BMI 24.73 kg/m       Assessment/Plan:                Adelina was seen today for recheck medication.    Diagnoses and all orders for this visit:    Anemia, unspecified type  -     CBC with Platelets & Differential; Future  -     Iron & Iron Binding Capacity; Future  -     Ferritin; Future  -     Cancel: CBC with Platelets & Differential  -     Iron & Iron Binding Capacity  -     Ferritin    Black stool  -     CBC with Platelets & Differential; Future  -     Iron & Iron Binding Capacity; Future  -     Ferritin; Future  -     Cancel: CBC with Platelets & Differential  -     Iron & Iron Binding Capacity  -     Ferritin    Acute deep vein thrombosis (DVT) of distal vein of right lower extremity (H)    Weight loss  -     CBC with Platelets & Differential         DISCUSSION  I am concerned about the potential for gastrointestinal bleeding on a blood thinner.  She had a hemoglobin obtained for one of her specialty providers this past Monday, the day before her visit.  We discussed checking hemoglobin again today on the day of her visit to determine if there is been any drop.  She does report she is taking iron, we will check her iron levels to get a baseline.    The cause of the leg numbness is unclear it is intermittent and somewhat longstanding.  We will need to consider options for evaluating what might be causing intermittent neurologic impingement based on her history.  We will consider further workup once we have a better idea of the concern with her gastrointestinal system.    Will plan to send her to hematology for further consultation regarding how long she should be on the anticoagulation.  While it would be somewhat unusual for her to have a manifestation of a coagulopathy first manifested age 79, without a good reason and with the family history I do think it at least " warrants some further consideration.    The duration of our visit today exceeded 50 minutes.  I spent a significant amount of time reviewing records from multiple specialty visits since our last encounter at the [facility] as well as visits that I had occurred here locally with [facility] and her rheumatology clinic.  Subjective:     HPI:    Carolina García is a 79 year old female with a complex medical history who is here today to follow-up after having been evaluated at the [facility] on new medications with concern for worsened liver disease and a new diagnosis of DVT.    She has a history of Sjogren's disease and cutaneous lupus.  She has been seen by rheumatology at the [facility].  She follows with the rheumatology here locally.  She is currently on a dose of prednisone for what is described as cutaneous lupus flare.    On February 29 due to the presence of leg swelling she was diagnosed with a DVT.  This is in the right leg.  Patient had concerns with more swelling in the left leg but the DVT was seen in the right she was started on apixaban.  She was instructed to consult with her primary care regarding duration of anticoagulation.  They recommended a minimum of 3 months.  There is no obvious inciting factor for her DVT.  Patient does report a nonspecific family history in mother and a 21-year-old nephew who had blood clots but no distinct known clotting disorder.  We discussed referral to hematology to decide on duration of anticoagulation given her situation.    She inquires about stopping her blood thinner to get dental work done.  I suggest she not stop the blood thinner until the minimum duration of 3 months passes given the high risk for recurrent DVT or PE.    She has had intermittent right leg numbness.  When standing her right leg will go numb for a minute or 2 and then resolved.  She denies pain.  Pain starts in the upper portion of the leg near the upper thigh and goes down  through the foot.  This can create difficulty with ambulation.  It is not persistent.  It occurs on average about 3 times per week.  Will consider options for investigating what is likely caused by some kind of intermittent nerve compression.    She reports what she believes to be blood in her stool.  She is taking an iron supplement.  She reports prior to that she did have black stools.  Stools are different consistency and are described as more runny but not diarrhea.  Occasionally has abdominal discomfort.  States she occasionally feels lightheaded which she attributes to her high-dose diuretics being used to treat her liver cirrhosis with fluid accumulation.  We discussed rechecking hemoglobin, 1 was just done yesterday but will make sure it has not dropped since then we will also check her iron levels.  We will try and make arrangements locally for her to have an endoscopy  and possibly colonoscopy to investigate further.    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  She had recent concerns for decompensation leading to significant fluid gain which was able to be controlled with use of diuretics.  She had been seen by gastroenterology at the [facility] and also was seen here locally.  Need to consider weather or not she has esophageal varicies.  Review of her most recent consultation states she did not have varices on her last endoscopy but the date of that is unknown.    Other medical history includes history of gastric bypass, hypertension, osteoarthritis, osteopenia, acid reflux and anxiety.    ROS:  Complete review of systems is obtained.  Other than the specific considerations noted above complete review of systems is negative.          Objective:   Medications:  Current Outpatient Medications   Medication    amLODIPine (NORVASC) 5 MG tablet    apixaban ANTICOAGULANT (ELIQUIS) 5 MG tablet    Ascorbic Acid (VITAMIN C) 500 MG CHEW    baclofen (LIORESAL) 10 MG tablet    CARBOXYMETHYLCELL/HYPROMELLOSE  (GENTEAL GEL OPHT)    chlorhexidine (PERIDEX) 0.12 % solution    citalopram (CELEXA) 20 MG tablet    fluocinonide (LIDEX) 0.05 % external cream    furosemide (LASIX) 20 MG tablet    ketorolac (ACULAR) 0.5 % ophthalmic solution    loratadine (CLARITIN) 10 mg tablet    losartan (COZAAR) 100 MG tablet    multivitamin therapeutic tablet    mupirocin (BACTROBAN) 2 % external ointment    OMEGA-3/DHA/EPA/FISH OIL (FISH OIL-OMEGA-3 FATTY ACIDS) 300-1,000 mg capsule    omeprazole (PRILOSEC) 20 MG capsule    pilocarpine (SALAGEN) 5 MG tablet    polyvinyl alcohol (LIQUIFILM TEARS) 1.4 % ophthalmic solution    prednisoLONE acetate (PRED FORTE) 1 % ophthalmic suspension    predniSONE (DELTASONE) 5 MG tablet    RESTASIS MULTIDOSE 0.05 % ophthalmic emulsion    spironolactone (ALDACTONE) 25 MG tablet    tiZANidine (ZANAFLEX) 4 MG tablet    TYRVAYA 0.03 MG/ACT nasal spray    valACYclovir (VALTREX) 1000 mg tablet     Current Facility-Administered Medications   Medication    ceFAZolin (ANCEF) 2 g in sodium chloride 0.9 % 100 mL intermittent infusion        Allergies:     Allergies   Allergen Reactions    Hydroxychloroquine Shortness Of Breath    Celecoxib GI Disturbance    Codeine Unknown     Emesis, tired    Egg White [Egg White (Egg Protein)] Unknown     Dumping syndrome        Social History     Socioeconomic History    Marital status:      Spouse name: Not on file    Number of children: 3    Years of education: Not on file    Highest education level: Not on file   Occupational History    Not on file   Tobacco Use    Smoking status: Never    Smokeless tobacco: Never   Vaping Use    Vaping Use: Never used   Substance and Sexual Activity    Alcohol use: Yes     Comment: Alcoholic Drinks/day: glass of wine on occasion    Drug use: No    Sexual activity: Yes     Partners: Male   Other Topics Concern    Not on file   Social History Narrative    Not on file     Social Determinants of Health     Financial Resource Strain: Low Risk   (12/12/2023)    Financial Resource Strain     Within the past 12 months, have you or your family members you live with been unable to get utilities (heat, electricity) when it was really needed?: No   Food Insecurity: Low Risk  (12/12/2023)    Food Insecurity     Within the past 12 months, did you worry that your food would run out before you got money to buy more?: No     Within the past 12 months, did the food you bought just not last and you didn t have money to get more?: No   Transportation Needs: Low Risk  (12/12/2023)    Transportation Needs     Within the past 12 months, has lack of transportation kept you from medical appointments, getting your medicines, non-medical meetings or appointments, work, or from getting things that you need?: No   Physical Activity: Not on file   Stress: Not on file   Social Connections: Not on file   Interpersonal Safety: Low Risk  (12/12/2023)    Interpersonal Safety     Do you feel physically and emotionally safe where you currently live?: Yes     Within the past 12 months, have you been hit, slapped, kicked or otherwise physically hurt by someone?: No     Within the past 12 months, have you been humiliated or emotionally abused in other ways by your partner or ex-partner?: No   Housing Stability: Low Risk  (12/12/2023)    Housing Stability     Do you have housing? : Yes     Are you worried about losing your housing?: No       Family History   Problem Relation Age of Onset    Breast Cancer Sister 52.00    Cancer Sister     Breast Cancer Sister 60.00        Most Recent Immunizations   Administered Date(s) Administered    COVID-19 Bivalent 18+ (Moderna) 04/03/2023    COVID-19 MONOVALENT 12+ (Pfizer) 09/25/2021    COVID-19 Monovalent 12+ (Pfizer 2022) 04/30/2022    DT (PEDS <7y) 01/01/1990    HepA, Unspecified 06/01/2011    HepB, Unspecified 10/02/2014    Hepatitis A (ADULT 19+) 06/01/2011    Hepatitis B, Adult 01/12/2015    Pneumo Conj 13-V (2010&after) 08/16/2017     "Pneumococcal 23 valent 04/13/2012    TD,PF 7+ (Tenivac) 11/15/2021    TDAP (Adacel,Boostrix) 04/29/2010    Td,adult,historic,unspecified 04/29/2010    Tdap (Adult) Unspecified Formulation 04/29/2010        Wt Readings from Last 3 Encounters:   03/19/24 71.6 kg (157 lb 14.4 oz)   01/31/24 71.2 kg (157 lb)   12/12/23 71.3 kg (157 lb 1.6 oz)        BP Readings from Last 6 Encounters:   03/19/24 124/60   03/13/24 121/61   01/31/24 (!) 149/70   12/31/23 (!) 145/72   12/20/23 130/70   12/12/23 134/68        Hemoglobin A1C   Date Value Ref Range Status   10/03/2023 5.4 0.0 - 5.6 % Final     Comment:     Normal <5.7%   Prediabetes 5.7-6.4%    Diabetes 6.5% or higher     Note: Adopted from ADA consensus guidelines.              PHYSICAL EXAM:    /60   Pulse 90   Temp 98  F (36.7  C)   Resp 20   Ht 1.702 m (5' 7\")   Wt 71.6 kg (157 lb 14.4 oz)   SpO2 99%   BMI 24.73 kg/m       General: Patient alert no signs of distress, she does appear to have lost weight since I last seen her and she does appear to have a slight pallor to her skin.    Heart: Regular rate and rhythm no murmur    Lungs: Good air movement without wheezes or crackles    Abdomen: Soft no distention at this time no significant tenderness on palpation.    Extremities: Warm no significant edema    Skin: No rashes    Neurologic: No focal motor or sensory deficit.                          Answers submitted by the patient for this visit:  General Questionnaire (Submitted on 3/19/2024)  Chief Complaint: Chronic problems general questions HPI Form  What is the reason for your visit today? : followup on many concerns  How many servings of fruits and vegetables do you eat daily?: 0-1  On average, how many sweetened beverages do you drink each day (Examples: soda, juice, sweet tea, etc.  Do NOT count diet or artificially sweetened beverages)?: 0  How many minutes a day do you exercise enough to make your heart beat faster?: 9 or less  How many days a week do " you exercise enough to make your heart beat faster?: 3 or less  How many days per week do you miss taking your medication?: 0

## 2024-03-20 ENCOUNTER — HOSPITAL ENCOUNTER (EMERGENCY)
Facility: CLINIC | Age: 79
Discharge: HOME | End: 2024-03-20
Admitting: PHYSICIAN ASSISTANT
Payer: COMMERCIAL

## 2024-03-20 VITALS
HEART RATE: 78 BPM | DIASTOLIC BLOOD PRESSURE: 80 MMHG | BODY MASS INDEX: 24.64 KG/M2 | HEIGHT: 67 IN | OXYGEN SATURATION: 98 % | RESPIRATION RATE: 17 BRPM | SYSTOLIC BLOOD PRESSURE: 154 MMHG | WEIGHT: 157 LBS | TEMPERATURE: 97.7 F

## 2024-03-20 DIAGNOSIS — R19.5 DARK STOOLS: ICD-10-CM

## 2024-03-20 DIAGNOSIS — D64.9 ANEMIA: ICD-10-CM

## 2024-03-20 DIAGNOSIS — Z79.01 CHRONIC ANTICOAGULATION: ICD-10-CM

## 2024-03-20 DIAGNOSIS — R42 LIGHTHEADEDNESS: ICD-10-CM

## 2024-03-20 LAB
ABO/RH(D): NORMAL
ANION GAP SERPL CALCULATED.3IONS-SCNC: 6 MMOL/L (ref 7–15)
ANTIBODY SCREEN: NEGATIVE
ATRIAL RATE - MUSE: 72 BPM
BUN SERPL-MCNC: 11.2 MG/DL (ref 8–23)
CALCIUM SERPL-MCNC: 8.2 MG/DL (ref 8.8–10.2)
CHLORIDE SERPL-SCNC: 99 MMOL/L (ref 98–107)
CREAT SERPL-MCNC: 0.9 MG/DL (ref 0.51–0.95)
DEPRECATED HCO3 PLAS-SCNC: 25 MMOL/L (ref 22–29)
DIASTOLIC BLOOD PRESSURE - MUSE: NORMAL MMHG
EGFRCR SERPLBLD CKD-EPI 2021: 66 ML/MIN/1.73M2
ERYTHROCYTE [DISTWIDTH] IN BLOOD BY AUTOMATED COUNT: 19.1 % (ref 10–15)
FERRITIN SERPL-MCNC: 30 NG/ML (ref 11–328)
GLUCOSE SERPL-MCNC: 123 MG/DL (ref 70–99)
HCT VFR BLD AUTO: 27 % (ref 35–47)
HGB BLD-MCNC: 9 G/DL (ref 11.7–15.7)
INTERPRETATION ECG - MUSE: NORMAL
IRON BINDING CAPACITY (ROCHE): 301 UG/DL (ref 240–430)
IRON SATN MFR SERPL: 17 % (ref 15–46)
IRON SERPL-MCNC: 50 UG/DL (ref 37–145)
MCH RBC QN AUTO: 28.9 PG (ref 26.5–33)
MCHC RBC AUTO-ENTMCNC: 33.3 G/DL (ref 31.5–36.5)
MCV RBC AUTO: 87 FL (ref 78–100)
P AXIS - MUSE: 35 DEGREES
PLATELET # BLD AUTO: 227 10E3/UL (ref 150–450)
POTASSIUM SERPL-SCNC: 4.4 MMOL/L (ref 3.4–5.3)
PR INTERVAL - MUSE: 162 MS
QRS DURATION - MUSE: 90 MS
QT - MUSE: 390 MS
QTC - MUSE: 427 MS
R AXIS - MUSE: 56 DEGREES
RBC # BLD AUTO: 3.11 10E6/UL (ref 3.8–5.2)
SODIUM SERPL-SCNC: 130 MMOL/L (ref 135–145)
SPECIMEN EXPIRATION DATE: NORMAL
SYSTOLIC BLOOD PRESSURE - MUSE: NORMAL MMHG
T AXIS - MUSE: 58 DEGREES
VENTRICULAR RATE- MUSE: 72 BPM
WBC # BLD AUTO: 9.6 10E3/UL (ref 4–11)

## 2024-03-20 PROCEDURE — 93005 ELECTROCARDIOGRAM TRACING: CPT | Performed by: PHYSICIAN ASSISTANT

## 2024-03-20 PROCEDURE — 80048 BASIC METABOLIC PNL TOTAL CA: CPT | Performed by: PHYSICIAN ASSISTANT

## 2024-03-20 PROCEDURE — 85027 COMPLETE CBC AUTOMATED: CPT | Performed by: PHYSICIAN ASSISTANT

## 2024-03-20 PROCEDURE — 36415 COLL VENOUS BLD VENIPUNCTURE: CPT | Performed by: PHYSICIAN ASSISTANT

## 2024-03-20 PROCEDURE — 99284 EMERGENCY DEPT VISIT MOD MDM: CPT

## 2024-03-20 PROCEDURE — 86900 BLOOD TYPING SEROLOGIC ABO: CPT | Performed by: PHYSICIAN ASSISTANT

## 2024-03-20 ASSESSMENT — COLUMBIA-SUICIDE SEVERITY RATING SCALE - C-SSRS
1. IN THE PAST MONTH, HAVE YOU WISHED YOU WERE DEAD OR WISHED YOU COULD GO TO SLEEP AND NOT WAKE UP?: NO
2. HAVE YOU ACTUALLY HAD ANY THOUGHTS OF KILLING YOURSELF IN THE PAST MONTH?: NO
6. HAVE YOU EVER DONE ANYTHING, STARTED TO DO ANYTHING, OR PREPARED TO DO ANYTHING TO END YOUR LIFE?: NO

## 2024-03-20 ASSESSMENT — ACTIVITIES OF DAILY LIVING (ADL): ADLS_ACUITY_SCORE: 35

## 2024-03-20 NOTE — ED NOTES
Expected Patient Referral to ED  5:32 PM    Referring Clinic/Provider:  [facility]    Reason for referral/Clinical facts:  Seen in clinic yesterday, recently started Eliquis, was noting black stools.  Hemoglobin yesterday 8.8 down from 9.2 the day before.    Recommendations provided:  Send to ED for further evaluation    Caller was informed that this institution does possess the capabilities and/or resources to provide for patient and should be transferred to our facility.    Discussed that if direct admit is sought and any hurdles are encountered, this ED would be happy to see the patient and evaluate.    Informed caller that recommendations provided are recommendations based only on the facts provided and that they responsible to accept or reject the advice, or to seek a formal in person consultation as needed and that this ED will see/treat patient should they arrive.      Lawson Hammond MD  [facility] EMERGENCY ROOM  [address] [address]  [phone]     Lawson Hammond MD  03/20/24 [id number]

## 2024-03-20 NOTE — ED TRIAGE NOTES
History of Lupus and liver problems and has just returned from [place] for second opinion. States has not been feeling well for sometime and has been having black stools. Blood work drawn yesterday and today and Hbg dropped from 9.2-8.8.  Denies shortness of breath     Triage Assessment (Adult)       Row Name 03/20/24 1857          Triage Assessment    Airway WDL WDL        Respiratory WDL    Respiratory WDL WDL

## 2024-03-21 ENCOUNTER — PATIENT OUTREACH (OUTPATIENT)
Dept: CARE COORDINATION | Facility: CLINIC | Age: 79
End: 2024-03-21
Payer: COMMERCIAL

## 2024-03-21 NOTE — DISCHARGE INSTRUCTIONS
Your hemoglobin is stable.  Your labs otherwise look great (Sodium is stable from testing 3/6).   EKG looks good as well.     Continue to monitor symptoms at home.  Continue to work with your primary provider and care team.    Return to the emergency department if you develop any new/worsening symptoms. We would be happy to see you.

## 2024-03-21 NOTE — LETTER
Carolina García  [address] [place] [id number]    Dear Carolina García,      I am a team member within the [facility] with [facility]. I recently contacted you to ensure you are doing well following a visit within our health system. I also wanted to take this chance to introduce Clinic Care Coordination should you have any interest in this program in the future.    Below is a description of Clinic Care Coordination and how this team can further assist you:       The Clinic Care Coordination team is made up of a Registered Nurse, , Financial Resource Worker, and a Community Health Worker who understand and can help navigate the health care system. The goal of clinic care coordination is to help you manage your health, improve access to care, and achieve optimal health outcomes. They work alongside your provider to assist you in determining your health and social needs, obtain health care and community resources, and provide you with necessary information and education. Clinic Care Coordination can work with you through any barriers and develop a care plan that helps coordinate and strengthen the relationship between you and your care team.    If you wish to connect with the Clinic Care Coordination Team, please let your [facility] Primary Care Provider or Clinic Care Team know and they can place a referral. The Clinic Care Coordination team will then reach out by phone to further support you.    We are focused on providing you with the highest-quality healthcare experience possible.    Sincerely,   Your care team with [facility]

## 2024-03-21 NOTE — ED PROVIDER NOTES
EMERGENCY DEPARTMENT ENCOUNTER      NAME: Carolina García  AGE: 79 year old female  YOB: 1945  MRN: [id number]  EVALUATION DATE & TIME: No admission date for patient encounter.    PCP: Rich Kelly    ED PROVIDER: Caren Coleman PA-C      Chief Complaint   Patient presents with    Abnormal Labs         FINAL IMPRESSION:  1. Lightheadedness    2. Anemia    3. Dark stools    4. Chronic anticoagulation          ED COURSE & MEDICAL DECISION MAKIN:00 PM I introduced myself to patient, performed initial HPI and examination.   9:56 PM Rechecked and updated the patient. We discussed the plan for discharge and the patient is agreeable. Reviewed supportive cares, symptomatic treatment, outpatient follow up, and reasons to return to the Emergency Department. Patient to be discharged by ED RN.       79 year old female with PMH liver cirrhosis, nonalcoholic steatohepatitis, Lupus, Sjogren's syndrome, DVT on Eliquis presents to the Emergency Department for evaluation of dark stools, episode of lightheadedness this morning. Patient is established with PCP, also working with [place] regarding elevated LFTs, SLE. Currently on Eliquis with recent DVT, also on 30 mg prednisone per rheumatology. Taking iron supplementation for anemia. Hemoglobin has been monitored, noted to be low yesterday prompting ED evaluation today. Patient reports episode of lightheadedness this morning, since resolved. No chest pain or shortness of breath.    VSS, mild hypertension but otherwise unremarkable.  Exam unremarkable.    Labs with stable hemoglobin (9.0), consistent over the past 3 months. Less suspicious for GI bleed given this. Patient does take iron supplementation, which could be causing dark stools, although patient notes symptoms started prior to starting iron. BMP with sodium 130, similar to 3/6 BMP (132). No electrolyte derangement.   EKG obtained with [facility]ipe earlier today, NSR, unremarkable. No history of  arrhythmias. Patient also had recent ECHO (2/26) through [facility] with no notable findings. Given transient lightheadedness this morning, no residual symptoms, troponin and further lab testing deferred.    Overall work up is stable and reassuring. Discussed results with patient. Instructed on close follow up with PCP/[facility] team and red flags/indications to return to the emergency department. All questions were answered to the best of my ability.         Medical Decision Making    History:  Supplemental history from: Family Member/Significant Other  External Record(s) reviewed: Documented in chart    Work Up:  Chart documentation includes differential considered and any EKGs or imaging independently interpreted by provider.  In additional to work up documented, I considered the following work up: Documented in chart, if applicable.    External consultation:  Discussion of management with another provider: Documented in chart, if applicable    Complicating factors:  Care impacted by chronic illness: Hypertension and Other: Sjogren's disease, cutaneous lupus  Care affected by social determinants of health: Access to Medical Care    Disposition considerations: Discharge. No recommendations on prescription strength medication(s). See documentation for any additional details.      MEDICATIONS GIVEN IN THE EMERGENCY:  Medications - No data to display    NEW PRESCRIPTIONS STARTED AT TODAY'S ER VISIT  New Prescriptions    No medications on file          =================================================================    HPI    Patient information was obtained from: Patient     Use of : N/A         Carolina García is a 79 year old female with a pertinent history of Sjogren's disease, cutaneous lupus, HTN,   who presents to this ED by car for evaluation of black stools and low hemoglobin     Patient was seen at  yesterday and her hgb was 8.8. She reports that she was found to have DVT in her right leg about a month  ago and was she started on a blood thinner. Since taking the blood thinner, she has been having black stools ( about 3 weeks). She is taking iron supplements. She has been feeling lightheaded recently and woke up today feeling more lightheaded than usual. She is on prednisone. She denies chest pain and shortness of breath.       REVIEW OF SYSTEMS   ROS negative unless otherwise stated    PAST MEDICAL HISTORY:  Past Medical History:   Diagnosis Date    GERD (gastroesophageal reflux disease)     Hypertension     Neck pain     Osteoarthritis 1/13/2016    Systemic lupus (H)        PAST SURGICAL HISTORY:  Past Surgical History:   Procedure Laterality Date    ARTHROPLASTY KNEE BILATERAL      BIOPSY BREAST Right 2005    benign    CV CORONARY ANGIOGRAM N/A 8/22/2019    Procedure: Coronary Angiogram;  Surgeon: Melissa Vasquez MD;  Location: [facility] Cath Lab;  Service: Cardiology    CV LEFT HEART CATHETERIZATION WITHOUT LEFT VENTRICULOGRAM Left 8/22/2019    Procedure: Left Heart Catheterization Without Left Ventriculogram;  Surgeon: Melissa Vasquez MD;  Location: [facility] Cath Lab;  Service: Cardiology    FISSURECTOMY RECTUM      HC REMOVAL GALLBLADDER      Description: Cholecystectomy;  Recorded: 04/16/2012;    HYSTERECTOMY  1986    OH TOT DISC ARTHRP ART DISC ANT APPRO 1 NTRSPC CRV N/A 6/17/2015    Procedure:  C45 MOBI-C DISC ARTHORPLASTY, ATTEMPT MOBI C C56, ANTERIOR CERVICAL DISCECTOMY AND FUSION IF UNABLE TO PLACE ;  Surgeon: Korina Beltrán MD;  Location: [facility];  Service: Spine    C GASTROPLASTY,OBESITY,OTHER      Description: Gastric Surgery For Morbid Obesity Gastric Stapling;  Recorded: 04/16/2012;       CURRENT MEDICATIONS:    amLODIPine (NORVASC) 5 MG tablet  apixaban ANTICOAGULANT (ELIQUIS) 5 MG tablet  Ascorbic Acid (VITAMIN C) 500 MG CHEW  baclofen (LIORESAL) 10 MG tablet  CARBOXYMETHYLCELL/HYPROMELLOSE (GENTEAL GEL OPHT)  chlorhexidine (PERIDEX) 0.12 % solution  citalopram (CELEXA) 20 MG  tablet  fluocinonide (LIDEX) 0.05 % external cream  furosemide (LASIX) 20 MG tablet  ketorolac (ACULAR) 0.5 % ophthalmic solution  loratadine (CLARITIN) 10 mg tablet  losartan (COZAAR) 100 MG tablet  multivitamin therapeutic tablet  mupirocin (BACTROBAN) 2 % external ointment  OMEGA-3/DHA/EPA/FISH OIL (FISH OIL-OMEGA-3 FATTY ACIDS) 300-1,000 mg capsule  omeprazole (PRILOSEC) 20 MG capsule  pilocarpine (SALAGEN) 5 MG tablet  polyvinyl alcohol (LIQUIFILM TEARS) 1.4 % ophthalmic solution  prednisoLONE acetate (PRED FORTE) 1 % ophthalmic suspension  predniSONE (DELTASONE) 5 MG tablet  RESTASIS MULTIDOSE 0.05 % ophthalmic emulsion  spironolactone (ALDACTONE) 25 MG tablet  tiZANidine (ZANAFLEX) 4 MG tablet  TYRVAYA 0.03 MG/ACT nasal spray  valACYclovir (VALTREX) 1000 mg tablet        ALLERGIES:  Allergies   Allergen Reactions    Hydroxychloroquine Shortness Of Breath    Celecoxib GI Disturbance    Codeine Unknown     Emesis, tired    Egg White [Egg White (Egg Protein)] Unknown     Dumping syndrome       FAMILY HISTORY:  Family History   Problem Relation Age of Onset    Breast Cancer Sister 52.00    Cancer Sister     Breast Cancer Sister 60.00       SOCIAL HISTORY:   Social History     Socioeconomic History    Marital status:     Number of children: 3   Tobacco Use    Smoking status: Never    Smokeless tobacco: Never   Vaping Use    Vaping Use: Never used   Substance and Sexual Activity    Alcohol use: Yes     Comment: Alcoholic Drinks/day: glass of wine on occasion    Drug use: No    Sexual activity: Yes     Partners: Male     Social Determinants of Health     Financial Resource Strain: Low Risk  (12/12/2023)    Financial Resource Strain     Within the past 12 months, have you or your family members you live with been unable to get utilities (heat, electricity) when it was really needed?: No   Food Insecurity: Low Risk  (12/12/2023)    Food Insecurity     Within the past 12 months, did you worry that your food  "would run out before you got money to buy more?: No     Within the past 12 months, did the food you bought just not last and you didn t have money to get more?: No   Transportation Needs: Low Risk  (12/12/2023)    Transportation Needs     Within the past 12 months, has lack of transportation kept you from medical appointments, getting your medicines, non-medical meetings or appointments, work, or from getting things that you need?: No   Interpersonal Safety: Low Risk  (12/12/2023)    Interpersonal Safety     Do you feel physically and emotionally safe where you currently live?: Yes     Within the past 12 months, have you been hit, slapped, kicked or otherwise physically hurt by someone?: No     Within the past 12 months, have you been humiliated or emotionally abused in other ways by your partner or ex-partner?: No   Housing Stability: Low Risk  (12/12/2023)    Housing Stability     Do you have housing? : Yes     Are you worried about losing your housing?: No       VITALS:  BP (!) 160/85   Pulse 83   Temp 97.7  F (36.5  C) (Oral)   Resp 15   Ht 1.702 m (5' 7\")   Wt 71.2 kg (157 lb)   SpO2 98%   BMI 24.59 kg/m      PHYSICAL EXAM    Constitutional: Well developed, Well nourished, NAD, GCS 15   HENT: Normocephalic, Atraumatic  Neck- Supple, Nontender. Normal ROM.  Eyes: Conjunctiva normal.   Respiratory: No respiratory distress, speaking in full sentences. Normal breath sounds  Cardiovascular: Normal heart rate, Regular rhythm, No murmurs.  GI: Soft, nontender  Musculoskeletal: No deformities, Moves all extremities equally. Ambulatory  Integument: Warm, Dry, No erythema, ecchymosis, or rash.  Neurologic: Alert & oriented x 3, Normal sensory function. No focal deficits.   Psychiatric: Affect normal, Judgment normal, Mood normal. Cooperative.      LAB:  All pertinent labs reviewed and interpreted.  Results for orders placed or performed during the hospital encounter of 03/20/24   CBC (+ platelets, no diff) "   Result Value Ref Range    WBC Count 9.6 4.0 - 11.0 10e3/uL    RBC Count 3.11 (L) 3.80 - 5.20 10e6/uL    Hemoglobin 9.0 (L) 11.7 - 15.7 g/dL    Hematocrit 27.0 (L) 35.0 - 47.0 %    MCV 87 78 - 100 fL    MCH 28.9 26.5 - 33.0 pg    MCHC 33.3 31.5 - 36.5 g/dL    RDW 19.1 (H) 10.0 - 15.0 %    Platelet Count 227 150 - 450 10e3/uL   Basic metabolic panel   Result Value Ref Range    Sodium 130 (L) 135 - 145 mmol/L    Potassium 4.4 3.4 - 5.3 mmol/L    Chloride 99 98 - 107 mmol/L    Carbon Dioxide (CO2) 25 22 - 29 mmol/L    Anion Gap 6 (L) 7 - 15 mmol/L    Urea Nitrogen 11.2 8.0 - 23.0 mg/dL    Creatinine 0.90 0.51 - 0.95 mg/dL    GFR Estimate 66 >60 mL/min/1.73m2    Calcium 8.2 (L) 8.8 - 10.2 mg/dL    Glucose 123 (H) 70 - 99 mg/dL   ECG 12-LEAD WITH MUSE (LHE)   Result Value Ref Range    Systolic Blood Pressure  mmHg    Diastolic Blood Pressure  mmHg    Ventricular Rate 72 BPM    Atrial Rate 72 BPM    MN Interval 162 ms    QRS Duration 90 ms     ms    QTc 427 ms    P Axis 35 degrees    R AXIS 56 degrees    T Axis 58 degrees    Interpretation ECG       Sinus rhythm  Septal infarct , age undetermined  Abnormal ECG  When compared with ECG of 23-APR-2021 13:36,  Septal infarct is now Present  Confirmed by SEE ED PROVIDER NOTE FOR, ECG INTERPRETATION ([id number]),  CHRISTINA RUBIN ([id number]) on 3/20/2024 9:54:39 PM     Adult Type and Screen   Result Value Ref Range    ABO/RH(D) A POS     Antibody Screen Negative Negative    SPECIMEN EXPIRATION DATE [id number]        RADIOLOGY:  Reviewed all pertinent imaging. Please see official radiology report.  No orders to display       EKG:    Performed at: 21:50:03    Impression: Sinus rhythm, No STEMI    Rate: 72 BPM  MN Interval: 162 ms  QRS Interval: 90 ms  QTc Interval: 390/427 ms  ST Changes: None  Comparison: When compared with ECG 23-April-2021 13:36, no significant change was found     Dr. Britt and I have independently reviewed and interpreted the EKG(s) documented  above.    PROCEDURES:   None      I, Paul Shabbir, am serving as a scribe to document services personally performed by Caren Coleman PA-C based on my observation and the provider's statements to me. I, Caren Coleman PA-C, attest that Paul Shea is acting in a scribe capacity, has observed my performance of the services and has documented them in accordance with my direction.    Caren Coleman PA-C  Emergency Medicine  [facility] EMERGENCY ROOM  [address] [address]  [phone]             Caren Coleman PA-C  03/20/24 1932

## 2024-03-21 NOTE — PROGRESS NOTES
Clinic Care Coordination Contact  Community Health Worker Initial Outreach    CHW Initial Information Gathering:  Referral Source: ED Follow-Up  CHW Additional Questions  MyChart active?: Yes    Patient accepts CC: No, patient declined at this time. Patient will be sent Care Coordination introduction letter for future reference.       Liz Montero  Community Health Worker  [facility], [facility]    *Connected Care Resource Team does NOT follow patient ongoing. Referrals are identified based on internal discharge reports and the outreach is to ensure patient has an understanding of their discharge instructions.

## 2024-03-27 ENCOUNTER — TRANSFERRED RECORDS (OUTPATIENT)
Dept: HEALTH INFORMATION MANAGEMENT | Facility: CLINIC | Age: 79
End: 2024-03-27
Payer: COMMERCIAL

## 2024-03-28 ENCOUNTER — OFFICE VISIT (OUTPATIENT)
Dept: RHEUMATOLOGY | Facility: CLINIC | Age: 79
End: 2024-03-28
Payer: COMMERCIAL

## 2024-03-28 VITALS
DIASTOLIC BLOOD PRESSURE: 60 MMHG | WEIGHT: 154 LBS | BODY MASS INDEX: 24.12 KG/M2 | HEART RATE: 94 BPM | SYSTOLIC BLOOD PRESSURE: 118 MMHG | OXYGEN SATURATION: 100 %

## 2024-03-28 DIAGNOSIS — M32.9 SLE-SJOGREN OVERLAP SYNDROME (H): Primary | ICD-10-CM

## 2024-03-28 DIAGNOSIS — M35.00 SLE-SJOGREN OVERLAP SYNDROME (H): Primary | ICD-10-CM

## 2024-03-28 DIAGNOSIS — L93.2 CUTANEOUS LUPUS ERYTHEMATOSUS: ICD-10-CM

## 2024-03-28 PROCEDURE — 99213 OFFICE O/P EST LOW 20 MIN: CPT | Performed by: PHYSICIAN ASSISTANT

## 2024-03-28 NOTE — PROGRESS NOTES
Rheumatology Clinic Visit  [facility]  Saranya LindsayPATRIZIA meadows     Carolina García MRN# [id number]   YOB: 1945 Age: 79 year old   Date of Visit: 03/28/2024  Primary care provider: Rich Kelly          Assessment and Plan:     Systemic Lupus Erythematous/Sjogren overlap  Cutaneous lupus     Patient presents today for follow up. She was seen at [facility] rheumatology and diagnosed with Sjogren's/Lupus overlap. SLE has consisted of a positive THEE: 1:2560, speckled pattern. Also has +SSA/+SSB, +RNP, hypocomplementemia, and cutaneous lupus.    A few days ago regarding patient starting on hydroxychloroquine.  She states that she is not aware that this prescription has been sent to the pharmacy.  I encouraged her to reach out to the [facility] to start on this medication.  Joints are doing overall well.  She has had some breakouts of her skin but that it is currently doing well.  She is tapering off of her prednisone currently.  She does have follow-up with gastroenterology regarding liver cirrhosis.  She also since her last visit was diagnosed with a DVT and will complete 3 months of anticoagulant therapy.  At this time patient has been following with the [facility] rheumatology group.  I encouraged her to reach out to them to see when they would like to see her again and also they are to start her on hydroxychloroquine.  Discussed that she may not need to follow-up with me if she has persistent follow-up with the [facility].  Certainly if she starts on a medication and [facility] would like toxicity monitoring with me I can help with that.  If the [facility] recommends that the patient has to continue to follow-up locally, would recommend that the patient establish with a [facility] rheumatology physician and I can also co-manage as well.  Patient will contact the [facility] for further recommendations on her follow-up.    Plan:     Follow up with [facility].  Okay to continue to  see me for interim visits if [place] would like, otherwise follow-up with the [facility] per their recommendations.    Saranya Law, PATRIZIA  Rheumatology         History of Present Illness:   Carolina García presents for evaluation of Sjogren's/Lupus overlap. She has a positive THEE: 1:2560, speckled pattern. Also has +SSA/+SSB, +RNP. Pertinently negative dsDNA and adam Ab. She had manifestations of sicca syndrome and of cutaneous rash which was confirmed as SCLE on biopsy     Rheumatological history:  Provider(s): Dr. Ivett May, Dr. Resendiz, Dr. Townsend  Last office visit: 06/20/2022  Pertinent lab history: Positive THEE, SSA/SSB, Rheumatoid factor, Negative CCP, other THEE subsets, Hep B and C; low complement levels, leukopenia  Previous medications tried: Evoxac (costly), Plaquenil (shortness of breath, retried and she had fatigue and stomach upset), Pilocarpine BID (TID causes lightheadedness)  Current medications: Pilocarpine 5mg twice daily    Interval history March 28, 2024:    Patient was evaluated with rheumatology at [facility]. Due to worsening rash, she has been on Prednisone. Per note dated 03/25/2024 with [place], She had 3 days of 100mg of Prednisone. She had worsening liver function tests and is waiting to see GI. Was recommended to start Plaquenil. Prednisone taper recommendations given as well.     Her skin has healed. Her breakouts tend to affect one area. Recently it was on her shoulder and back. She does plan to see dermatology again for her skin.     She will be having a liver biopsy. She is hoping to have the biopsy the same day as her liver ultrasound. She states that she had some teeth pulled and then went to get a bridge and she lost a couple more teeth. She states that she has been having to eat soft teeth. She has met with her dentist and they are unable to do anything due to her being on blood thinners for 3 months. She still has dry mouth. She is using the Pilocarpine still. She also  uses the Biotene. She does feel that the dry mouth comes and goes.       Interval history November 30, 2023:  She started to have a rash all over. She saw dermatology for it and did a biopsy which showed cutaneous lupus. She was given Prednisone and was told to see rheumatology. She will be on the tail end of Prednisone next week. Her skin as been clearing but has been itchy. She has not had any joint pain or joint swelling. She states that she noticed changes to her eyes. She will be having cataract surgery. She states that by the afternoon, she has the sensation that there is a lot sand in her eye. She uses eye gel and warm compresses.       Interval history September 25, 2023:  She reports that she has continued to lose weight. She states that her cheeks are starting to skin in. She has had some episodes of shortness of breath with walking as well. She has an appointment with primary care on 11/10. No fevers. No skin rashes or mouth sores. She states that her joints ache. She states that today is good day. It seems to depend on the weather. Her knees get sore occasionally.     She states that at times she has worse dry mouth than other times. She has continued on the pilocarpine twice daily. She states that her cough has been getting worse. She states that she will get episodes of the dry cough.     Interval history March 27, 2023:  She stopped the hydroxychloroquine, she became significantly fatigued and stomach upset. She stopped it after 2 weeks. These stopped with discontinuing the medication.    She continues with significant dry eyes. She has also developed a cough. She does continue on the Pilocarpine for her dry mouth. She has some days where it does not help.She uses a lot of Biotene. She tried the lozenges, but it made her mouth feel very sore. This can also be dependent on what she eats. She states that her joints are doing good.       HPI from Consult:  She states that the Pilocarpine doesn't  "completely helps. She continues to drink a lot of fluids. She sees the eye doctor regularly and she has been given eye drops and recently given some samples of \"sniffs\" which helps but, even with GoodRx it is $500. She has also been using over the counter options as well. Her joints are sore at times. She has artificial knees and when the weather changes she will have increased soreness.    No known heart conditions. No retinopathy. She does have the start of cataracts.          Review of Systems:     Constitutional: negative  Skin: negative  Eyes: negative  Ears/Nose/Throat: negative  Respiratory: No shortness of breath, dyspnea on exertion, cough, or hemoptysis  Cardiovascular: negative  Gastrointestinal: negative  Genitourinary: negative  Musculoskeletal: as above  Neurologic: negative  Psychiatric: negative  Hematologic/Lymphatic/Immunologic: negative  Endocrine: negative         Active Problem List:     Patient Active Problem List    Diagnosis Date Noted    Cirrhosis of liver (H) 09/26/2023     Priority: Medium    Cervical spine pain 07/14/2023     Priority: Medium    Myofascial pain 07/14/2023     Priority: Medium    Arthropathy of cervical facet joint 07/14/2023     Priority: Medium    H/O cervical spine surgery 07/14/2023     Priority: Medium    Nonalcoholic steatohepatitis 06/07/2021     Priority: Medium    Nausea and vomiting 04/29/2021     Priority: Medium    Abnormal liver function tests 04/29/2021     Priority: Medium    Gastritis 10/13/2020     Priority: Medium     Created by Conversion      Nonspecific abdominal pain 10/09/2020     Priority: Medium    Precordial chest pain      Priority: Medium    Abnormal nuclear stress test 08/15/2019     Priority: Medium     Added automatically from request for surgery [id number]        Localized primary carpometacarpal osteoarthritis 10/24/2018     Priority: Medium    Sjogren's syndrome (H24) 08/20/2018     Priority: Medium    Primary osteoarthritis involving multiple " joints 08/20/2018     Priority: Medium    THEE positive 08/20/2018     Priority: Medium    Anxiety      Priority: Medium     Created by Conversion  Replacement Utility updated for latest IMO load        Hypertension      Priority: Medium     Created by Conversion  Replacement Utility updated for latest IMO load        Rotator Cuff Tendonitis      Priority: Medium     Created by Conversion  Replacement Utility updated for latest IMO load        Allergic Rhinitis      Priority: Medium     Created by Conversion  Replacement Utility updated for latest IMO load        Menopause Has Occurred      Priority: Medium     Created by Conversion  Replacement Utility updated for latest IMO load        Hypocomplementemia (H) 01/13/2016     Priority: Medium    Subacute cutaneous lupus erythematosus 10/13/2015     Priority: Medium    Cervical radiculopathy at C5 06/17/2015     Priority: Medium    Myelopathy, spondylogenic, cervical 06/17/2015     Priority: Medium    Diverticular disease of colon 09/23/2014     Priority: Medium            Past Medical History:     Past Medical History:   Diagnosis Date    GERD (gastroesophageal reflux disease)     Hypertension     Neck pain     Osteoarthritis 1/13/2016    Systemic lupus (H)      Past Surgical History:   Procedure Laterality Date    ARTHROPLASTY KNEE BILATERAL      BIOPSY BREAST Right 2005    benign    CV CORONARY ANGIOGRAM N/A 8/22/2019    Procedure: Coronary Angiogram;  Surgeon: Melissa Vasquez MD;  Location: [facility] Cath Lab;  Service: Cardiology    CV LEFT HEART CATHETERIZATION WITHOUT LEFT VENTRICULOGRAM Left 8/22/2019    Procedure: Left Heart Catheterization Without Left Ventriculogram;  Surgeon: Melissa Vasquez MD;  Location: [facility] Cath Lab;  Service: Cardiology    FISSURECTOMY RECTUM      HC REMOVAL GALLBLADDER      Description: Cholecystectomy;  Recorded: 04/16/2012;    HYSTERECTOMY  1986    WA TOT DISC ARTHRP ART DISC ANT APPRO 1 NTRSPC CRV N/A 6/17/2015     Procedure:  C45 MOBI-C DISC ARTHORPLASTY, ATTEMPT MOBI C C56, ANTERIOR CERVICAL DISCECTOMY AND FUSION IF UNABLE TO PLACE ;  Surgeon: Korina Beltrán MD;  Location: [facility];  Service: Spine    [facility] GASTROPLASTY,OBESITY,OTHER      Description: Gastric Surgery For Morbid Obesity Gastric Stapling;  Recorded: 04/16/2012;            Social History:     Social History     Socioeconomic History    Marital status:      Spouse name: Not on file    Number of children: 3    Years of education: Not on file    Highest education level: Not on file   Occupational History    Not on file   Tobacco Use    Smoking status: Never    Smokeless tobacco: Never   Vaping Use    Vaping Use: Never used   Substance and Sexual Activity    Alcohol use: Yes     Comment: Alcoholic Drinks/day: glass of wine on occasion    Drug use: No    Sexual activity: Yes     Partners: Male   Other Topics Concern    Not on file   Social History Narrative    Not on file     Social Determinants of Health     Financial Resource Strain: Low Risk  (12/12/2023)    Financial Resource Strain     Within the past 12 months, have you or your family members you live with been unable to get utilities (heat, electricity) when it was really needed?: No   Food Insecurity: Low Risk  (12/12/2023)    Food Insecurity     Within the past 12 months, did you worry that your food would run out before you got money to buy more?: No     Within the past 12 months, did the food you bought just not last and you didn t have money to get more?: No   Transportation Needs: Low Risk  (12/12/2023)    Transportation Needs     Within the past 12 months, has lack of transportation kept you from medical appointments, getting your medicines, non-medical meetings or appointments, work, or from getting things that you need?: No   Physical Activity: Not on file   Stress: Not on file   Social Connections: Not on file   Interpersonal Safety: Low Risk  (12/12/2023)    Interpersonal Safety      Do you feel physically and emotionally safe where you currently live?: Yes     Within the past 12 months, have you been hit, slapped, kicked or otherwise physically hurt by someone?: No     Within the past 12 months, have you been humiliated or emotionally abused in other ways by your partner or ex-partner?: No   Housing Stability: Low Risk  (12/12/2023)    Housing Stability     Do you have housing? : Yes     Are you worried about losing your housing?: No          Family History:     Family History   Problem Relation Age of Onset    Breast Cancer Sister 52.00    Cancer Sister     Breast Cancer Sister 60.00            Allergies:     Allergies   Allergen Reactions    Hydroxychloroquine Shortness Of Breath    Celecoxib GI Disturbance    Codeine Unknown     Emesis, tired    Egg White [Egg White (Egg Protein)] Unknown     Dumping syndrome            Medications:     Current Outpatient Medications   Medication Sig Dispense Refill    amLODIPine (NORVASC) 5 MG tablet TAKE 1 TABLET BY MOUTH EVERY DAY 90 tablet 3    apixaban ANTICOAGULANT (ELIQUIS) 5 MG tablet Take by mouth.      Ascorbic Acid (VITAMIN C) 500 MG CHEW Take 1,000 mg by mouth      baclofen (LIORESAL) 10 MG tablet TAKE 0.5-1 TABLETS (5-10 MG) BY MOUTH 2 TIMES DAILY AS NEEDED FOR MUSCLE SPASMS 180 tablet 1    CARBOXYMETHYLCELL/HYPROMELLOSE (GENTEAL GEL OPHT) [CARBOXYMETHYLCELL/HYPROMELLOSE (GENTEAL GEL OPHT)] Apply 2 drops to eye as needed.      chlorhexidine (PERIDEX) 0.12 % solution SWISH 1 CAPFUL (15ML) UNDILUTED FOR 30 SECONDS AND SPIT, TWICE DAILY IN THE MORNING AND EVENING      citalopram (CELEXA) 20 MG tablet TAKE 1 & 1/2 TABLETS BY MOUTH ONCE A  tablet 1    fluocinonide (LIDEX) 0.05 % external cream Apply topically 2 times daily      furosemide (LASIX) 20 MG tablet Take 20 mg by mouth every morning      ketorolac (ACULAR) 0.5 % ophthalmic solution INSTILL 1 DROP INTO LEFT EYE TWICE A DAY START AFTER SURGERY. USE UNTIL BOTTLE IS FINISHED.       loratadine (CLARITIN) 10 mg tablet [LORATADINE (CLARITIN) 10 MG TABLET] Take 10 mg by mouth daily.      losartan (COZAAR) 100 MG tablet TAKE 1 TABLET BY MOUTH EVERY DAY 90 tablet 3    multivitamin therapeutic tablet [MULTIVITAMIN THERAPEUTIC TABLET] Take 1 tablet by mouth daily.      mupirocin (BACTROBAN) 2 % external ointment Apply topically 2 times daily 30 g 0    OMEGA-3/DHA/EPA/FISH OIL (FISH OIL-OMEGA-3 FATTY ACIDS) 300-1,000 mg capsule [OMEGA-3/DHA/EPA/FISH OIL (FISH OIL-OMEGA-3 FATTY ACIDS) 300-1,000 MG CAPSULE] Take 2 g by mouth daily.      omeprazole (PRILOSEC) 20 MG capsule [OMEPRAZOLE (PRILOSEC) 20 MG CAPSULE] Take 2 capsules (40 mg total) by mouth 2 (two) times a day before meals. 120 capsule 1    pilocarpine (SALAGEN) 5 MG tablet Take 1 tablet (5 mg) by mouth 2 times daily 180 tablet 0    polyvinyl alcohol (LIQUIFILM TEARS) 1.4 % ophthalmic solution [POLYVINYL ALCOHOL (LIQUIFILM TEARS) 1.4 % OPHTHALMIC SOLUTION] Administer 1 drop to both eyes as needed for dry eyes.      prednisoLONE acetate (PRED FORTE) 1 % ophthalmic suspension PLEASE SEE ATTACHED FOR DETAILED DIRECTIONS      predniSONE (DELTASONE) 5 MG tablet Take 30 mg by mouth daily      RESTASIS MULTIDOSE 0.05 % ophthalmic emulsion Place 1 drop into both eyes 2 times daily      spironolactone (ALDACTONE) 25 MG tablet Take 50 mg by mouth daily      tiZANidine (ZANAFLEX) 4 MG tablet Take 1 tablet by mouth 3 times daily      TYRVAYA 0.03 MG/ACT nasal spray       valACYclovir (VALTREX) 1000 mg tablet Take 1 tablet by mouth 3 times daily              Physical Exam:   There were no vitals taken for this visit.  Wt Readings from Last 6 Encounters:   03/20/24 71.2 kg (157 lb)   03/19/24 71.6 kg (157 lb 14.4 oz)   01/31/24 71.2 kg (157 lb)   12/12/23 71.3 kg (157 lb 1.6 oz)   11/30/23 68.5 kg (151 lb)   10/03/23 67.5 kg (148 lb 14.4 oz)     Constitutional: well-developed, appearing stated age; cooperative  Eyes: nl conjunctiva, sclera  ENT: nl external  ears, nose, hearing, lips,  Neck: no visible mass or thyroid enlargement; no palpable adenopathy  Resp: No shortness of breath with normal conversation  MSK: decreased fist formation bilaterally. Normal  strength.   Skin: erythematous plaques over her arms and chest.   Psych: nl judgement, orientation, memory, affect.           Data:   Imagin2019 CXR  FINDINGS: Negative chest.    MRI cervical spine 2023  IMPRESSION:  1.  Artificial disc replacement at C4-C5 and interbody fusion device at C5-C6.  2.  At C5-C6, there is severe right and moderate left neural foraminal stenosis.  3.  At C4-C5, there is moderate right neural foraminal stenosis.    CT cervical spine 2023  IMPRESSION:     1.  No acute fracture or traumatic malalignment of the cervical spine.  2.  Postsurgical changes at C4-C5 and C5-C6 as described above. No evidence of hardware failure.  3.  Multilevel cervical spondylosis including multilevel facet arthropathy as described above.    CT chest 10/04/2023  IMPRESSION:   1.  No significant interstitial disease nor bronchiectasis.  2.  Multiple solid pulmonary nodules measuring 3 mm or less.  3.  5 mm groundglass opacity right middle lobe.  4.  Solid and subsolid pulmonary nodules could be followed up per Fleischner Society criteria, if no previous comparison C    Laboratory:  2022  Albumin 3.3  ,   White blood cell count 3.6, hemoglobin 11.2, platelet count 260    2023  Creatinine 0.89, GFR 67  Albumin 3.3  ALT 75, AST 95  White blood cell count 3.6, hemoglobin 11.2, platelet count 149    2023  Albumin 3.0  ALT 74,     2023  CRP less than 3.0  Sed rate 54  Urine positive for protein  THEE positive with a speckled pattern and a titer greater than 1: 1280  RNP antibody negative  Lim antibody negative  SSA positive  SSB positive  Double-stranded DNA 3.5  C3 41, C4 12

## 2024-03-28 NOTE — PATIENT INSTRUCTIONS
After Visit Instructions:     Thank you for coming to [facility] for your care. It is my goal to partner with you to help you reach your optimal state of health.       Plan:     Call [facility] to see if they are going to be prescribing the Hydroxychloroquine, also see when they would like you to follow up with them.   Follow up with me will depend on the plan going forward with [facility]. If you have consistent follow up with them, you do not necessarily need to see me as well.     Saranya Law PA-C  [facility]  [place]    Contact information: [facility]  Clinic Number:  [phone]  Please call or send a [facility] message with any questions about your care

## 2024-03-29 ENCOUNTER — TELEPHONE (OUTPATIENT)
Dept: RHEUMATOLOGY | Facility: CLINIC | Age: 79
End: 2024-03-29
Payer: COMMERCIAL

## 2024-03-29 DIAGNOSIS — I10 ESSENTIAL HYPERTENSION: ICD-10-CM

## 2024-03-29 RX ORDER — AMLODIPINE BESYLATE 5 MG/1
TABLET ORAL
Qty: 90 TABLET | Refills: 3 | Status: SHIPPED | OUTPATIENT
Start: 2024-03-29

## 2024-03-29 NOTE — TELEPHONE ENCOUNTER
[facility] Call Center    Phone Message    May a detailed message be left on voicemail: yes     Reason for Call: Other: Dr Yesi Grossman was returning a call she received from Saranya Law PA-C. Please have Saranya call her back at (her personal number) [phone], ok to leave .     Action Taken: Message routed to:  Other: [facility] [facility]    Travel Screening: Not Applicable

## 2024-04-01 ENCOUNTER — TELEPHONE (OUTPATIENT)
Dept: FAMILY MEDICINE | Facility: CLINIC | Age: 79
End: 2024-04-01
Payer: COMMERCIAL

## 2024-04-01 RX ORDER — HYDROXYCHLOROQUINE SULFATE 200 MG/1
200 TABLET, FILM COATED ORAL
COMMUNITY
Start: 2024-03-29

## 2024-04-02 ENCOUNTER — VIRTUAL VISIT (OUTPATIENT)
Dept: FAMILY MEDICINE | Facility: CLINIC | Age: 79
End: 2024-04-02
Payer: COMMERCIAL

## 2024-04-02 DIAGNOSIS — M35.01 SJOGREN'S SYNDROME WITH KERATOCONJUNCTIVITIS SICCA (H): ICD-10-CM

## 2024-04-02 DIAGNOSIS — I82.4Z1 ACUTE DEEP VEIN THROMBOSIS (DVT) OF DISTAL VEIN OF RIGHT LOWER EXTREMITY (H): ICD-10-CM

## 2024-04-02 DIAGNOSIS — L93.2 CUTANEOUS LUPUS ERYTHEMATOSUS: ICD-10-CM

## 2024-04-02 DIAGNOSIS — D64.9 ANEMIA, UNSPECIFIED TYPE: Primary | ICD-10-CM

## 2024-04-02 PROCEDURE — 99441 PR PHYSICIAN TELEPHONE EVALUATION 5-10 MIN: CPT | Mod: 93 | Performed by: FAMILY MEDICINE

## 2024-04-02 NOTE — PROGRESS NOTES
Adelina is a 79 year old who is being evaluated via a billable telephone visit.    What phone number would you like to be contacted at? [phone]   How would you like to obtain your AVS? [place]  Originating Location (pt. Location): Home    Distant Location (provider location):  On-site    Adelina was seen today for recheck medication and results.    Diagnoses and all orders for this visit:    Anemia, unspecified type  -     CBC with Platelets & Differential; Future    Acute deep vein thrombosis (DVT) of distal vein of right lower extremity (H)  -     Adult Oncology/Hematology  Referral; Future    Cutaneous lupus erythematosus    Sjogren's syndrome with keratoconjunctivitis sicca (H24)       Reviewed clinical course.  Discussed primarily rechecking hemoglobin and referring her to hematology to decide ultimately on course of anticoagulation.  She has planned follow-up with rheumatology and gastroenterology for evaluation of her liver disease and lupus.    Subjective   Adelina is a 79 year old, presenting for the following health issues:  No chief complaint on file.    HPI     Has a complex medical history.  She has Sjogren's disease and lupus.  She has been seen recently at the [facility].  She is having a flare.  She has been on prednisone.    She recently developed an acute blood clot.  She was evaluated at [facility].  There is not a clear-cut reason that I can find that she would have developed a DVT.  She has no prior history or family history.  She is on appropriate anticoagulation.  Duration of anticoagulation remains uncertain.    When I seen her recently in the outpatient setting she complained of black stool and had a dropping hemoglobin she was sent for more urgent evaluation.  She does not have continuing black stool.  Her hemoglobin upon discharge from the emergency department was actually noted to be higher than it was prior.  Patient does not report any symptoms of worsening anemia or bleeding  otherwise.  We discussed recheck of hemoglobin.    She is scheduled to have biopsy of her liver as well as further evaluation by [facility] for her worsening liver disease.      Complete review of systems is obtained.  Other than the specific considerations noted above complete review of systems is negative.        Objective           Vitals:  No vitals were obtained today due to virtual visit.    Physical Exam   General: Alert and no distress //Respiratory: No audible wheeze, cough, or shortness of breath // Psychiatric:  Appropriate affect, tone, and pace of words            Phone call duration: 10 minutes  Signed Electronically by: Rich Kelly MD, MD

## 2024-04-04 ENCOUNTER — TRANSFERRED RECORDS (OUTPATIENT)
Dept: HEALTH INFORMATION MANAGEMENT | Facility: CLINIC | Age: 79
End: 2024-04-04
Payer: COMMERCIAL

## 2024-04-08 ENCOUNTER — LAB (OUTPATIENT)
Dept: LAB | Facility: HOSPITAL | Age: 79
End: 2024-04-08
Payer: COMMERCIAL

## 2024-04-08 DIAGNOSIS — D64.9 ANEMIA, UNSPECIFIED TYPE: ICD-10-CM

## 2024-04-08 LAB
BASOPHILS # BLD AUTO: 0 10E3/UL (ref 0–0.2)
BASOPHILS NFR BLD AUTO: 1 %
EOSINOPHIL # BLD AUTO: 0 10E3/UL (ref 0–0.7)
EOSINOPHIL NFR BLD AUTO: 0 %
ERYTHROCYTE [DISTWIDTH] IN BLOOD BY AUTOMATED COUNT: 18.1 % (ref 10–15)
HCT VFR BLD AUTO: 25.7 % (ref 35–47)
HGB BLD-MCNC: 8.3 G/DL (ref 11.7–15.7)
IMM GRANULOCYTES # BLD: 0 10E3/UL
IMM GRANULOCYTES NFR BLD: 0 %
LYMPHOCYTES # BLD AUTO: 0.5 10E3/UL (ref 0.8–5.3)
LYMPHOCYTES NFR BLD AUTO: 13 %
MCH RBC QN AUTO: 29.3 PG (ref 26.5–33)
MCHC RBC AUTO-ENTMCNC: 32.3 G/DL (ref 31.5–36.5)
MCV RBC AUTO: 91 FL (ref 78–100)
MONOCYTES # BLD AUTO: 0.5 10E3/UL (ref 0–1.3)
MONOCYTES NFR BLD AUTO: 13 %
NEUTROPHILS # BLD AUTO: 2.5 10E3/UL (ref 1.6–8.3)
NEUTROPHILS NFR BLD AUTO: 73 %
NRBC # BLD AUTO: 0 10E3/UL
NRBC BLD AUTO-RTO: 0 /100
PLATELET # BLD AUTO: 152 10E3/UL (ref 150–450)
RBC # BLD AUTO: 2.83 10E6/UL (ref 3.8–5.2)
WBC # BLD AUTO: 3.4 10E3/UL (ref 4–11)

## 2024-04-08 PROCEDURE — 85025 COMPLETE CBC W/AUTO DIFF WBC: CPT

## 2024-04-08 PROCEDURE — 36415 COLL VENOUS BLD VENIPUNCTURE: CPT

## 2024-04-09 DIAGNOSIS — D64.9 ANEMIA, UNSPECIFIED TYPE: Primary | ICD-10-CM

## 2024-04-10 ENCOUNTER — TELEPHONE (OUTPATIENT)
Dept: FAMILY MEDICINE | Facility: CLINIC | Age: 79
End: 2024-04-10
Payer: COMMERCIAL

## 2024-04-10 DIAGNOSIS — D64.9 ANEMIA, UNSPECIFIED TYPE: Primary | ICD-10-CM

## 2024-04-10 DIAGNOSIS — K74.60 CIRRHOSIS OF LIVER WITHOUT ASCITES, UNSPECIFIED HEPATIC CIRRHOSIS TYPE (H): ICD-10-CM

## 2024-04-10 NOTE — TELEPHONE ENCOUNTER
----- Message from Rich Kelly MD sent at 4/10/2024  3:28 PM CDT -----  Regarding: Anemia and need for endoscopy  This patient has worsening anemia.  She is on anticoagulation due to a DVT.  She has cirrhosis and is followed by [facility], whether she has varices or not is unknown.  She needs an urgent endoscopy.  Please see if we can get this scheduled for her with [facility] (urgent referral placed).  We are monitoring hemoglobin which is currently 8.3.  She likely has some mild symptoms of her anemia.  There is not sign of rapid blood loss and we are going to monitor hemoglobin again tomorrow she is going to go to the ER if it gets worse but we are hoping to do this on an outpatient basis.

## 2024-04-10 NOTE — TELEPHONE ENCOUNTER
Spoke to pt and warm transferred her over to Luly with [facility] to schedule her upper endoscopy. Informed Luly that referral was faxed by writer and fax machine confirmed it went through like 5 mins ago before calling. Patient was warm transferred to Luly to schedule her appt.    AWILDA RamN, RN  [facility]

## 2024-04-11 ENCOUNTER — TRANSFERRED RECORDS (OUTPATIENT)
Dept: HEALTH INFORMATION MANAGEMENT | Facility: CLINIC | Age: 79
End: 2024-04-11

## 2024-04-11 ENCOUNTER — TELEPHONE (OUTPATIENT)
Dept: FAMILY MEDICINE | Facility: CLINIC | Age: 79
End: 2024-04-11

## 2024-04-11 ENCOUNTER — LAB (OUTPATIENT)
Dept: LAB | Facility: HOSPITAL | Age: 79
End: 2024-04-11
Payer: COMMERCIAL

## 2024-04-11 DIAGNOSIS — K74.60 CIRRHOSIS (H): ICD-10-CM

## 2024-04-11 DIAGNOSIS — D64.9 ANEMIA, UNSPECIFIED TYPE: ICD-10-CM

## 2024-04-11 DIAGNOSIS — D64.9 ANEMIA, UNSPECIFIED TYPE: Primary | ICD-10-CM

## 2024-04-11 DIAGNOSIS — K75.81 LIVER CIRRHOSIS SECONDARY TO NONALCOHOLIC STEATOHEPATITIS (NASH) (H): Primary | ICD-10-CM

## 2024-04-11 DIAGNOSIS — K74.60 LIVER CIRRHOSIS SECONDARY TO NONALCOHOLIC STEATOHEPATITIS (NASH) (H): Primary | ICD-10-CM

## 2024-04-11 LAB
AFP SERPL-MCNC: <1.8 NG/ML
ALBUMIN SERPL BCG-MCNC: 3 G/DL (ref 3.5–5.2)
ALP SERPL-CCNC: 138 U/L (ref 40–150)
ALT SERPL W P-5'-P-CCNC: 48 U/L (ref 0–50)
AST SERPL W P-5'-P-CCNC: 69 U/L (ref 0–45)
BASOPHILS # BLD AUTO: 0 10E3/UL (ref 0–0.2)
BASOPHILS NFR BLD AUTO: 1 %
BILIRUB DIRECT SERPL-MCNC: 0.32 MG/DL (ref 0–0.3)
BILIRUB SERPL-MCNC: 1 MG/DL
EOSINOPHIL # BLD AUTO: 0 10E3/UL (ref 0–0.7)
EOSINOPHIL NFR BLD AUTO: 0 %
ERYTHROCYTE [DISTWIDTH] IN BLOOD BY AUTOMATED COUNT: 17.8 % (ref 10–15)
FERRITIN SERPL-MCNC: 48 NG/ML (ref 11–328)
HCT VFR BLD AUTO: 26.3 % (ref 35–47)
HGB BLD-MCNC: 8.5 G/DL (ref 11.7–15.7)
IMM GRANULOCYTES # BLD: 0 10E3/UL
IMM GRANULOCYTES NFR BLD: 0 %
IRON BINDING CAPACITY (ROCHE): 302 UG/DL (ref 240–430)
IRON SATN MFR SERPL: 44 % (ref 15–46)
IRON SERPL-MCNC: 134 UG/DL (ref 37–145)
LYMPHOCYTES # BLD AUTO: 0.6 10E3/UL (ref 0.8–5.3)
LYMPHOCYTES NFR BLD AUTO: 15 %
MCH RBC QN AUTO: 29.1 PG (ref 26.5–33)
MCHC RBC AUTO-ENTMCNC: 32.3 G/DL (ref 31.5–36.5)
MCV RBC AUTO: 90 FL (ref 78–100)
MONOCYTES # BLD AUTO: 0.5 10E3/UL (ref 0–1.3)
MONOCYTES NFR BLD AUTO: 12 %
NEUTROPHILS # BLD AUTO: 2.9 10E3/UL (ref 1.6–8.3)
NEUTROPHILS NFR BLD AUTO: 72 %
NRBC # BLD AUTO: 0 10E3/UL
NRBC BLD AUTO-RTO: 0 /100
PLATELET # BLD AUTO: 171 10E3/UL (ref 150–450)
PROT SERPL-MCNC: 6.2 G/DL (ref 6.4–8.3)
RBC # BLD AUTO: 2.92 10E6/UL (ref 3.8–5.2)
VIT B12 SERPL-MCNC: 579 PG/ML (ref 232–1245)
WBC # BLD AUTO: 3.9 10E3/UL (ref 4–11)

## 2024-04-11 PROCEDURE — 85048 AUTOMATED LEUKOCYTE COUNT: CPT

## 2024-04-11 PROCEDURE — 36415 COLL VENOUS BLD VENIPUNCTURE: CPT

## 2024-04-11 PROCEDURE — 82105 ALPHA-FETOPROTEIN SERUM: CPT

## 2024-04-11 PROCEDURE — 80076 HEPATIC FUNCTION PANEL: CPT

## 2024-04-11 PROCEDURE — 83540 ASSAY OF IRON: CPT

## 2024-04-11 PROCEDURE — 83550 IRON BINDING TEST: CPT

## 2024-04-11 PROCEDURE — 82607 VITAMIN B-12: CPT

## 2024-04-11 PROCEDURE — 82728 ASSAY OF FERRITIN: CPT

## 2024-04-11 NOTE — TELEPHONE ENCOUNTER
Order/Referral Request    Who is requesting: [facility]    Orders being requested: request a 3 day hold on patient Eliquis starting on 04/27/2024 prior to patient upper EGD. If   unable to do a 3 day hold they will request a creatinine level for the 90 days     Reason service is needed/diagnosis: anemia fat liver     When are orders needed by: 04/27/2024    Has this been discussed with Provider: No    Does patient have a preference on a Group/Provider/Facility? [facility]    Does patient have an appointment scheduled?: No    Where to send orders: Place orders within Epic and call [facility]     Could we send this information to you in [place] or would you prefer to receive a phone call?:   No preference   Okay to leave a detailed message?: Yes at Other phone number:  [phone]

## 2024-04-11 NOTE — TELEPHONE ENCOUNTER
GEOVANY Hudson clinical support, called from [facility] to clarify a request for a 3 day hold of the patient's apixaban (Eliquis) earlier today, 4/11/24.    Becca stated that the patient is scheduled for an EGD on 4/30/24 for anemia, fatty liver and possible cirrhosis    Stated the purpose of the EGD is to look for esophageal varices    Per lab results of patient's creatinine level on 3/20/24, which was 0.90, and per [facility] guidelines, the patient's CrCl is not sufficient for less than a three day hold of the patient's Eliquis.    Becca stated that if the patient cannot have a three day hold of her Eliquis, then [facility] would need clearance from the PCP for a time the patient could have a 3 hold of her Eliquis    Becca stated that if the patient can't have a three day hold of the Eliquis, then during the EGD, the [facility] provider could only confirm the patient had esophageal varices, but they could not band/clip the varices due to risk of increased bleeding     Patient is scheduled for the EGD with Dr. Dany Yates at the [facility] on 4/30/24    Becca stated that [facility] would need approval from the PCP if the patient can have a less than 3 day hold of her Eliquis to complete the EGD.    May warrant an MD to MD conversation regarding the above concerns at [phone] or PCP can relay his response through the RN staff, if approved by the PCP.    Writer will route the above concerns to the PCP to review and advise next steps.    Caro Ferris RN, BSN  [facility]

## 2024-04-11 NOTE — TELEPHONE ENCOUNTER
Given her recent DVT would prefer to avoid holding anticoagulation if possible.  The information provided in the message is not clear as to what the alternative is regarding a potential shorter duration of hold please clarify.  Please determine if it is possible to arrange for EGD prior to April 27 given the situation.

## 2024-04-11 NOTE — TELEPHONE ENCOUNTER
Writer called and left message to return call. If call is returned please route to nurse queue to discuss notes and questions from PCP.    Please route message back to PCP to review for further recommendations after speaking to [facility].    AWILDA DhillonN, RN  [facility]

## 2024-04-16 ENCOUNTER — TRANSFERRED RECORDS (OUTPATIENT)
Dept: HEALTH INFORMATION MANAGEMENT | Facility: CLINIC | Age: 79
End: 2024-04-16
Payer: COMMERCIAL

## 2024-04-17 ENCOUNTER — LAB (OUTPATIENT)
Dept: LAB | Facility: HOSPITAL | Age: 79
End: 2024-04-17
Payer: COMMERCIAL

## 2024-04-17 DIAGNOSIS — D64.9 ANEMIA, UNSPECIFIED TYPE: ICD-10-CM

## 2024-04-17 LAB
BASOPHILS # BLD AUTO: 0 10E3/UL (ref 0–0.2)
BASOPHILS NFR BLD AUTO: 1 %
EOSINOPHIL # BLD AUTO: 0 10E3/UL (ref 0–0.7)
EOSINOPHIL NFR BLD AUTO: 0 %
ERYTHROCYTE [DISTWIDTH] IN BLOOD BY AUTOMATED COUNT: 17.8 % (ref 10–15)
HCT VFR BLD AUTO: 28.6 % (ref 35–47)
HGB BLD-MCNC: 9 G/DL (ref 11.7–15.7)
IMM GRANULOCYTES # BLD: 0 10E3/UL
IMM GRANULOCYTES NFR BLD: 0 %
LYMPHOCYTES # BLD AUTO: 0.7 10E3/UL (ref 0.8–5.3)
LYMPHOCYTES NFR BLD AUTO: 15 %
MCH RBC QN AUTO: 28.8 PG (ref 26.5–33)
MCHC RBC AUTO-ENTMCNC: 31.5 G/DL (ref 31.5–36.5)
MCV RBC AUTO: 91 FL (ref 78–100)
MONOCYTES # BLD AUTO: 0.4 10E3/UL (ref 0–1.3)
MONOCYTES NFR BLD AUTO: 9 %
NEUTROPHILS # BLD AUTO: 3.5 10E3/UL (ref 1.6–8.3)
NEUTROPHILS NFR BLD AUTO: 75 %
NRBC # BLD AUTO: 0 10E3/UL
NRBC BLD AUTO-RTO: 0 /100
PLATELET # BLD AUTO: 266 10E3/UL (ref 150–450)
RBC # BLD AUTO: 3.13 10E6/UL (ref 3.8–5.2)
WBC # BLD AUTO: 4.8 10E3/UL (ref 4–11)

## 2024-04-17 PROCEDURE — 36415 COLL VENOUS BLD VENIPUNCTURE: CPT

## 2024-04-17 PROCEDURE — 85025 COMPLETE CBC W/AUTO DIFF WBC: CPT

## 2024-04-18 NOTE — TELEPHONE ENCOUNTER
General Call    Contacts         Type Contact Phone/Fax    04/11/2024 11:32 AM CDT Phone (Incoming) [place]--[phone]    04/11/2024 01:09 PM CDT Phone (Outgoing) [place] [phone]    Left Message     04/11/2024 01:40 PM CDT Phone (Incoming) [facility]cca[facility]AYA [phone]     ok to leave a detailed message          Reason for Call:  status update    What are your questions or concerns:  Becca RN, with [facility] calling requesting a status update for requested orders below. Please advise, as they requested one week ago.

## 2024-04-18 NOTE — TELEPHONE ENCOUNTER
Okay for 3-day hold as requested by gastroenterology for the upcoming procedure.    The following is for documentation purposes and does not need to be shared with gastroenterology    Situation is discussed with patient.  We acknowledge that there is a risk of stopping anticoagulation given her fairly recent DVT but we both agree that the benefit of getting the test outweighs that small risk at this point in time of stopping the anticoagulation for the short duration.  I do not think that bridging would provide significant benefit and would only complicate the situation further especially given the concerns that we have regarding potential bleeding.  We discussed her most recent hemoglobin which is reassuring in the sense that it has increased to what has seemingly been her most recent baseline over the past several months at 9.0.  She is not describing any concerning symptoms to me at this time regarding any ongoing bleeding concerns and we will proceed the test as scheduled she will let me know of any concerns otherwise.  She is scheduled with hematology to further address the concerns regarding her recent DVT.  I do recommend that anticoagulation is restarted in the most timely manner possible following the procedure pending the findings and recommendations of the gastroenterologist performing the procedure.

## 2024-04-18 NOTE — TELEPHONE ENCOUNTER
Informed GEOVANY Oconnell with [facility] of approved 3-day hold of Eliquis prior to scheduled procedure per Dr Kelly. No other questions at this time.    AWILDA RamN, RN  [facility]

## 2024-04-22 ENCOUNTER — MYC MEDICAL ADVICE (OUTPATIENT)
Dept: FAMILY MEDICINE | Facility: CLINIC | Age: 79
End: 2024-04-22
Payer: COMMERCIAL

## 2024-04-22 DIAGNOSIS — R60.9 EDEMA, UNSPECIFIED TYPE: ICD-10-CM

## 2024-04-22 DIAGNOSIS — K74.60 CIRRHOSIS OF LIVER WITHOUT ASCITES, UNSPECIFIED HEPATIC CIRRHOSIS TYPE (H): Primary | ICD-10-CM

## 2024-04-22 RX ORDER — FUROSEMIDE 20 MG
40 TABLET ORAL EVERY MORNING
Qty: 180 TABLET | Refills: 1 | Status: SHIPPED | OUTPATIENT
Start: 2024-04-22

## 2024-04-22 NOTE — TELEPHONE ENCOUNTER
Confirmed with pt that she is currently taking furosemide 2 tablets (40 mg) in the morning. Patient stated she will be needing more refills to her regular [facility] pharmacy. Rx pended; routing to PCP to review and advise.    AWILDA RamN, RN  [facility]

## 2024-04-22 NOTE — TELEPHONE ENCOUNTER
RECORDS STATUS - ALL OTHER DIAGNOSIS      RECORDS RECEIVED FROM: Epic   DATE RECEIVED:    NOTES STATUS DETAILS   OFFICE NOTE from referring provider Epic 4/2/24: Dr. Rich Kelly   MEDICATION LIST [facility]    LABS     ANYTHING RELATED TO DIAGNOSIS Epic Most recent 4/17/24

## 2024-04-30 ENCOUNTER — TRANSFERRED RECORDS (OUTPATIENT)
Dept: HEALTH INFORMATION MANAGEMENT | Facility: CLINIC | Age: 79
End: 2024-04-30
Payer: COMMERCIAL

## 2024-05-01 ENCOUNTER — ONCOLOGY VISIT (OUTPATIENT)
Dept: ONCOLOGY | Facility: HOSPITAL | Age: 79
End: 2024-05-01
Attending: FAMILY MEDICINE
Payer: COMMERCIAL

## 2024-05-01 ENCOUNTER — PRE VISIT (OUTPATIENT)
Dept: ONCOLOGY | Facility: HOSPITAL | Age: 79
End: 2024-05-01
Payer: COMMERCIAL

## 2024-05-01 VITALS
BODY MASS INDEX: 23.28 KG/M2 | HEIGHT: 67 IN | DIASTOLIC BLOOD PRESSURE: 70 MMHG | WEIGHT: 148.3 LBS | HEART RATE: 109 BPM | SYSTOLIC BLOOD PRESSURE: 145 MMHG | TEMPERATURE: 98 F | RESPIRATION RATE: 16 BRPM | OXYGEN SATURATION: 97 %

## 2024-05-01 DIAGNOSIS — I82.4Z1 ACUTE DEEP VEIN THROMBOSIS (DVT) OF DISTAL VEIN OF RIGHT LOWER EXTREMITY (H): ICD-10-CM

## 2024-05-01 PROCEDURE — G0463 HOSPITAL OUTPT CLINIC VISIT: HCPCS | Performed by: INTERNAL MEDICINE

## 2024-05-01 PROCEDURE — G2211 COMPLEX E/M VISIT ADD ON: HCPCS | Performed by: INTERNAL MEDICINE

## 2024-05-01 PROCEDURE — 99203 OFFICE O/P NEW LOW 30 MIN: CPT | Performed by: INTERNAL MEDICINE

## 2024-05-01 ASSESSMENT — PAIN SCALES - GENERAL: PAINLEVEL: NO PAIN (0)

## 2024-05-01 NOTE — PROGRESS NOTES
[facility] Hematology and Oncology Consult Note    Patient: Carolina García  MRN: [id number]  Date of Service: 05/01/2024      Reason for Visit    Chief Complaint   Patient presents with    Oncology Clinic Visit     New Non-Malignant Oncology-Acute deep vein thrombosis (DVT) of distal vein of right lower extremity.         Assessment/Plan    Problem List Items Addressed This Visit    None  Visit Diagnoses       Acute deep vein thrombosis (DVT) of distal vein of right lower extremity (H)        Relevant Orders    Lupus Anticoagulant Panel          Acute right lower extremity DVT  I reviewed her chart in detail.  Presented with right lower extremity swelling on February 26, 2024.  Ultrasound showed right soleal vein DVT.  Currently on anticoagulation with apixaban.  Seemingly unprovoked clot.  4 lower extremity distal vein DVT, even in the unprovoked setting, recommendation is to consider at least 3 months of anticoagulation and monitor going forward.  The risk of embolization with the distal DVT is low hence the reason for limited time anticoagulation.  However due to presence of underlying rheumatologic condition she is at risk for future blood clots.  So a case could be made to continue anticoagulation beyond 3 months.  At the same time she also has other bleeding risk factors including GI bleeding.  She had significant drop in her hemoglobin after starting anticoagulation and also had black stools.  So there are contraindications to continue indefinite anticoagulations at the same time.  With her underlying rheumatologic issues I think we need to make sure that she does not have APLS.  So I recommend lupus anticoagulant testing but for this she has to be off of anticoagulation.  So my plan is to continue Eliquis for a total of 3 months.  Stop it for 2 weeks and check lupus anticoagulant and restart Eliquis.  I will see her back after that to discuss the results.  If lupus anticoagulant test is negative then  potentially we can discontinue anticoagulation and monitor closely for any clot recurrence.  I strongly encouraged using compression stockings.  She is agreeable to the plan.        ECOG Performance    1 - Can't do physically strenuous work, but fully ambyulatory and can do light sedentary work    Problem List    Patient Active Problem List   Diagnosis    Anxiety    Hypertension    Rotator Cuff Tendonitis    Gastritis    Allergic Rhinitis    Menopause Has Occurred    Cervical radiculopathy at C5    Myelopathy, spondylogenic, cervical    Subacute cutaneous lupus erythematosus    Hypocomplementemia (H)    Sjogren's syndrome (H24)    Primary osteoarthritis involving multiple joints    THEE positive    Localized primary carpometacarpal osteoarthritis    Abnormal nuclear stress test    Precordial chest pain    Nonspecific abdominal pain    Nausea and vomiting    Diverticular disease of colon    Abnormal liver function tests    Cervical spine pain    Myofascial pain    Arthropathy of cervical facet joint    H/O cervical spine surgery    Cirrhosis of liver (H)    Nonalcoholic steatohepatitis     ______________________________________________________________________________    Staging History     Cancer Staging   No matching staging information was found for the patient.        History of presenting illness:  Carolina García is a 79-year-old female with history of Sjogren syndrome, cutaneous lupus, history of gastric bypass surgery, possible cirrhosis secondary to MILLS, who has a recent diagnosis of right lower extremity DVT on anticoagulation with apixaban who has been referred by her primary care provider for further evaluation management of DVT.    She presented to her PCP at [facility] on 2/26/2024 with right lower extremity swelling and pain along with weight loss and other concerns.  Documentation from [facility] at that time shows that she had right lower extremity swelling.  Also complained of shortness of breath.   An ultrasound of the lower extremity was ordered which came back showing DVT in the right soleal vein.  No evidence of any proximal DVT.  She was started on apixaban initial loading dose followed by 5 mg twice daily.  For her rheumatologic conditions that she was referred to rheumatology at [facility].  They initiated workup which included testing for APLS with history of recent DVT.  She also started her on high-dose prednisone with taper.  Anticardiolipin and antibeta-2 glycoprotein antibodies came back negative.  I do not see any lupus anticoagulant testing.  This would out testing for lupus anticoagulant would not have been possible due to her being on apixaban.  For her elevated transaminases she follows with GI.  She has significantly elevated THEE.  But no evidence of any autoimmune hepatitis based on labs and previous biopsy.      Unfortunately after starting anticoagulation she has developed black stools and had a drop in hemoglobin.  She has been referred to GI and endoscopy is planned in the near future.    No prior history of any blood clots.  The clot that happened in late February was seemingly unprovoked.  No prior hospitalizations or long distance travel.  No family history of any blood clots.  She has a very strong family history of breast cancer.      Past History    Past Medical History:   Diagnosis Date    GERD (gastroesophageal reflux disease)     Hypertension     Neck pain     Osteoarthritis 1/13/2016    Systemic lupus (H)     Family History   Problem Relation Age of Onset    Breast Cancer Sister 52.00    Cancer Sister     Breast Cancer Sister 60.00      Past Surgical History:   Procedure Laterality Date    ARTHROPLASTY KNEE BILATERAL      BIOPSY BREAST Right 2005    benign    CV CORONARY ANGIOGRAM N/A 8/22/2019    Procedure: Coronary Angiogram;  Surgeon: Melissa Vasquez MD;  Location: [facility] Cath Lab;  Service: Cardiology    CV LEFT HEART CATHETERIZATION WITHOUT LEFT VENTRICULOGRAM Left  8/22/2019    Procedure: Left Heart Catheterization Without Left Ventriculogram;  Surgeon: Melissa Vasquez MD;  Location: [facility] Cath Lab;  Service: Cardiology    FISSURECTOMY RECTUM      HC REMOVAL GALLBLADDER      Description: Cholecystectomy;  Recorded: 04/16/2012;    HYSTERECTOMY  1986    OH TOT DISC ARTHRP ART DISC ANT APPRO 1 NTRSPC CRV N/A 6/17/2015    Procedure:  C45 MOBI-C DISC ARTHORPLASTY, ATTEMPT MOBI C C56, ANTERIOR CERVICAL DISCECTOMY AND FUSION IF UNABLE TO PLACE ;  Surgeon: Korina Beltrán MD;  Location: [facility] Main OR;  Service: Spine    ZZC GASTROPLASTY,OBESITY,OTHER      Description: Gastric Surgery For Morbid Obesity Gastric Stapling;  Recorded: 04/16/2012;    Social History     Socioeconomic History    Marital status:      Spouse name: Not on file    Number of children: 3    Years of education: Not on file    Highest education level: Not on file   Occupational History    Not on file   Tobacco Use    Smoking status: Never     Passive exposure: Past    Smokeless tobacco: Never   Vaping Use    Vaping status: Never Used   Substance and Sexual Activity    Alcohol use: Yes     Comment: Alcoholic Drinks/day: glass of wine on occasion    Drug use: No    Sexual activity: Yes     Partners: Male   Other Topics Concern    Not on file   Social History Narrative    Not on file     Social Determinants of Health     Financial Resource Strain: Low Risk  (12/12/2023)    Financial Resource Strain     Within the past 12 months, have you or your family members you live with been unable to get utilities (heat, electricity) when it was really needed?: No   Food Insecurity: No Food Insecurity (2/22/2024)    Received from [facility]    Hunger Vital Sign     Worried About Running Out of Food in the Last Year: Never true     Ran Out of Food in the Last Year: Never true   Transportation Needs: No Transportation Needs (2/22/2024)    Received from [facility]    PRAROBERT -  Transportation     Lack of Transportation (Medical): No     Lack of Transportation (Non-Medical): No   Physical Activity: Inactive (2/19/2024)    Received from [facility]    Exercise Vital Sign     Days of Exercise per Week: 0 days     Minutes of Exercise per Session: 0 min   Stress: Not on file   Social Connections: Not on file   Interpersonal Safety: Low Risk  (12/12/2023)    Interpersonal Safety     Do you feel physically and emotionally safe where you currently live?: Yes     Within the past 12 months, have you been hit, slapped, kicked or otherwise physically hurt by someone?: No     Within the past 12 months, have you been humiliated or emotionally abused in other ways by your partner or ex-partner?: No   Housing Stability: Low Risk  (2/22/2024)    [facility]    Housing Stability     What is your living situation today?: I have a steady place to live        Allergies    Allergies   Allergen Reactions    Hydroxychloroquine Shortness Of Breath    Celecoxib GI Disturbance    Codeine Unknown     Emesis, tired    Egg White [Egg White (Egg Protein)] Unknown     Dumping syndrome       Review of Systems    Pertinent items are noted in HPI.      Physical Exam        5/1/2024    10:49 AM   Oncology Vitals   Height 170 cm   Weight 67.268 kg   BSA (m2) 1.78 m2   /70   Pulse 109   Temp 98  F (36.7  C)   Temp src Oral   SpO2 97 %   Pain Score 0 (None)       General: alert and cooperative  HEENT: Head: Normal, normocephalic, atraumatic.  Eye: Normal external eye, conjunctiva, lids cornea, SERENE.  Extremities: atraumatic, no peripheral edema  CNS: Alert and oriented x3, neurologic exam grossly normal.        Lab Results    No results found for this or any previous visit (from the past 168 hour(s)).    Imaging Results    No results found.    A total of 30 minutes was spent today on this visit including face to face conversation with the patient, EMR review (labs, imaging studies,  pathology reports and outside records), counseling and care co-ordination and documentation.    The longitudinal plan of care for the diagnosis(es)/condition(s) as documented were addressed during this visit. Due to the added complexity in care, I will continue to support Adelina in the subsequent management and with ongoing continuity of care.      Signed by: Lesli Yuan MD

## 2024-05-01 NOTE — LETTER
5/1/2024         RE: Carolina García  [address] [place] [address] [facility] [address]        Dear Colleague,    Thank you for referring your patient, Carolina García, to the [facility] [place]. Please see a copy of my visit note below.    [facility] Hematology and Oncology Consult Note    Patient: Carolina García  MRN: [id number]  Date of Service: 05/01/2024      Reason for Visit    Chief Complaint   Patient presents with     Oncology Clinic Visit     New Non-Malignant Oncology-Acute deep vein thrombosis (DVT) of distal vein of right lower extremity.         Assessment/Plan    Problem List Items Addressed This Visit    None  Visit Diagnoses       Acute deep vein thrombosis (DVT) of distal vein of right lower extremity (H)        Relevant Orders    Lupus Anticoagulant Panel          Acute right lower extremity DVT  I reviewed her chart in detail.  Presented with right lower extremity swelling on February 26, 2024.  Ultrasound showed right soleal vein DVT.  Currently on anticoagulation with apixaban.  Seemingly unprovoked clot.  4 lower extremity distal vein DVT, even in the unprovoked setting, recommendation is to consider at least 3 months of anticoagulation and monitor going forward.  The risk of embolization with the distal DVT is low hence the reason for limited time anticoagulation.  However due to presence of underlying rheumatologic condition she is at risk for future blood clots.  So a case could be made to continue anticoagulation beyond 3 months.  At the same time she also has other bleeding risk factors including GI bleeding.  She had significant drop in her hemoglobin after starting anticoagulation and also had black stools.  So there are contraindications to continue indefinite anticoagulations at the same time.  With her underlying rheumatologic issues I think we need to make sure that she does not have APLS.  So I recommend lupus anticoagulant testing but for this she has to be  off of anticoagulation.  So my plan is to continue Eliquis for a total of 3 months.  Stop it for 2 weeks and check lupus anticoagulant and restart Eliquis.  I will see her back after that to discuss the results.  If lupus anticoagulant test is negative then potentially we can discontinue anticoagulation and monitor closely for any clot recurrence.  I strongly encouraged using compression stockings.  She is agreeable to the plan.        ECOG Performance    1 - Can't do physically strenuous work, but fully ambyulatory and can do light sedentary work    Problem List    Patient Active Problem List   Diagnosis     Anxiety     Hypertension     Rotator Cuff Tendonitis     Gastritis     Allergic Rhinitis     Menopause Has Occurred     Cervical radiculopathy at C5     Myelopathy, spondylogenic, cervical     Subacute cutaneous lupus erythematosus     Hypocomplementemia (H)     Sjogren's syndrome (H24)     Primary osteoarthritis involving multiple joints     THEE positive     Localized primary carpometacarpal osteoarthritis     Abnormal nuclear stress test     Precordial chest pain     Nonspecific abdominal pain     Nausea and vomiting     Diverticular disease of colon     Abnormal liver function tests     Cervical spine pain     Myofascial pain     Arthropathy of cervical facet joint     H/O cervical spine surgery     Cirrhosis of liver (H)     Nonalcoholic steatohepatitis     ______________________________________________________________________________    Staging History     Cancer Staging   No matching staging information was found for the patient.        History of presenting illness:  Carolina García is a 79-year-old female with history of Sjogren syndrome, cutaneous lupus, history of gastric bypass surgery, possible cirrhosis secondary to MILLS, who has a recent diagnosis of right lower extremity DVT on anticoagulation with apixaban who has been referred by her primary care provider for further evaluation management of  DVT.    She presented to her PCP at [facility] on 2/26/2024 with right lower extremity swelling and pain along with weight loss and other concerns.  Documentation from [facility] at that time shows that she had right lower extremity swelling.  Also complained of shortness of breath.  An ultrasound of the lower extremity was ordered which came back showing DVT in the right soleal vein.  No evidence of any proximal DVT.  She was started on apixaban initial loading dose followed by 5 mg twice daily.  For her rheumatologic conditions that she was referred to rheumatology at [facility].  They initiated workup which included testing for APLS with history of recent DVT.  She also started her on high-dose prednisone with taper.  Anticardiolipin and antibeta-2 glycoprotein antibodies came back negative.  I do not see any lupus anticoagulant testing.  This would out testing for lupus anticoagulant would not have been possible due to her being on apixaban.  For her elevated transaminases she follows with GI.  She has significantly elevated THEE.  But no evidence of any autoimmune hepatitis based on labs and previous biopsy.      Unfortunately after starting anticoagulation she has developed black stools and had a drop in hemoglobin.  She has been referred to GI and endoscopy is planned in the near future.    No prior history of any blood clots.  The clot that happened in late February was seemingly unprovoked.  No prior hospitalizations or long distance travel.  No family history of any blood clots.  She has a very strong family history of breast cancer.      Past History    Past Medical History:   Diagnosis Date     GERD (gastroesophageal reflux disease)      Hypertension      Neck pain      Osteoarthritis 1/13/2016     Systemic lupus (H)     Family History   Problem Relation Age of Onset     Breast Cancer Sister 52.00     Cancer Sister      Breast Cancer Sister 60.00      Past Surgical History:   Procedure Laterality Date      ARTHROPLASTY KNEE BILATERAL       BIOPSY BREAST Right 2005    benign     CV CORONARY ANGIOGRAM N/A 8/22/2019    Procedure: Coronary Angiogram;  Surgeon: Melissa Vasquez MD;  Location: [facility] Cath Lab;  Service: Cardiology     CV LEFT HEART CATHETERIZATION WITHOUT LEFT VENTRICULOGRAM Left 8/22/2019    Procedure: Left Heart Catheterization Without Left Ventriculogram;  Surgeon: Melissa Vasquez MD;  Location: [facility] Cath Lab;  Service: Cardiology     FISSURECTOMY RECTUM       HC REMOVAL GALLBLADDER      Description: Cholecystectomy;  Recorded: 04/16/2012;     HYSTERECTOMY  1986     VA TOT DISC ARTHRP ART DISC ANT APPRO 1 NTRSPC CRV N/A 6/17/2015    Procedure:  C45 MOBI-C DISC ARTHORPLASTY, ATTEMPT MOBI C C56, ANTERIOR CERVICAL DISCECTOMY AND FUSION IF UNABLE TO PLACE ;  Surgeon: Korina Beltrán MD;  Location: [facility] OR;  Service: Spine     ZZC GASTROPLASTY,OBESITY,OTHER      Description: Gastric Surgery For Morbid Obesity Gastric Stapling;  Recorded: 04/16/2012;    Social History     Socioeconomic History     Marital status:      Spouse name: Not on file     Number of children: 3     Years of education: Not on file     Highest education level: Not on file   Occupational History     Not on file   Tobacco Use     Smoking status: Never     Passive exposure: Past     Smokeless tobacco: Never   Vaping Use     Vaping status: Never Used   Substance and Sexual Activity     Alcohol use: Yes     Comment: Alcoholic Drinks/day: glass of wine on occasion     Drug use: No     Sexual activity: Yes     Partners: Male   Other Topics Concern     Not on file   Social History Narrative     Not on file     Social Determinants of Health     Financial Resource Strain: Low Risk  (12/12/2023)    Financial Resource Strain      Within the past 12 months, have you or your family members you live with been unable to get utilities (heat, electricity) when it was really needed?: No   Food Insecurity: No Food Insecurity  (2/22/2024)    Received from [facility]    Hunger Vital Sign      Worried About Running Out of Food in the Last Year: Never true      Ran Out of Food in the Last Year: Never true   Transportation Needs: No Transportation Needs (2/22/2024)    Received from [facility]    PRAPARE - Transportation      Lack of Transportation (Medical): No      Lack of Transportation (Non-Medical): No   Physical Activity: Inactive (2/19/2024)    Received from [facility]    Exercise Vital Sign      Days of Exercise per Week: 0 days      Minutes of Exercise per Session: 0 min   Stress: Not on file   Social Connections: Not on file   Interpersonal Safety: Low Risk  (12/12/2023)    Interpersonal Safety      Do you feel physically and emotionally safe where you currently live?: Yes      Within the past 12 months, have you been hit, slapped, kicked or otherwise physically hurt by someone?: No      Within the past 12 months, have you been humiliated or emotionally abused in other ways by your partner or ex-partner?: No   Housing Stability: Low Risk  (2/22/2024)    Received from [facility]    Housing Stability      What is your living situation today?: I have a steady place to live        Allergies    Allergies   Allergen Reactions     Hydroxychloroquine Shortness Of Breath     Celecoxib GI Disturbance     Codeine Unknown     Emesis, tired     Egg White [Egg White (Egg Protein)] Unknown     Dumping syndrome       Review of Systems    Pertinent items are noted in HPI.      Physical Exam        5/1/2024    10:49 AM   Oncology Vitals   Height 170 cm   Weight 67.268 kg   BSA (m2) 1.78 m2   /70   Pulse 109   Temp 98  F (36.7  C)   Temp src Oral   SpO2 97 %   Pain Score 0 (None)       General: alert and cooperative  HEENT: Head: Normal, normocephalic, atraumatic.  Eye: Normal external eye, conjunctiva, lids cornea, SERENE.  Extremities: atraumatic, no peripheral edema  CNS: Alert and oriented  "x3, neurologic exam grossly normal.        Lab Results    No results found for this or any previous visit (from the past 168 hour(s)).    Imaging Results    No results found.    A total of 30 minutes was spent today on this visit including face to face conversation with the patient, EMR review (labs, imaging studies, pathology reports and outside records), counseling and care co-ordination and documentation.    The longitudinal plan of care for the diagnosis(es)/condition(s) as documented were addressed during this visit. Due to the added complexity in care, I will continue to support Adelina in the subsequent management and with ongoing continuity of care.      Signed by: Lesli Yuan MD      Oncology Rooming Note    May 1, 2024 10:55 AM   Carolina García is a 79 year old female who presents for:    Chief Complaint   Patient presents with     Oncology Clinic Visit     New Non-Malignant Oncology-Acute deep vein thrombosis (DVT) of distal vein of right lower extremity.     Initial Vitals: BP (!) 145/70 (BP Location: Right arm, Patient Position: Sitting, Cuff Size: Adult Regular)   Pulse 109   Temp 98  F (36.7  C) (Oral)   Resp 16   Ht 1.702 m (5' 7\")   Wt 67.3 kg (148 lb 4.8 oz)   SpO2 97%   BMI 23.23 kg/m   Estimated body mass index is 23.23 kg/m  as calculated from the following:    Height as of this encounter: 1.702 m (5' 7\").    Weight as of this encounter: 67.3 kg (148 lb 4.8 oz). Body surface area is 1.78 meters squared.  No Pain (0) Comment: Data Unavailable   No LMP recorded. Patient has had a hysterectomy.  Allergies reviewed: Yes  Medications reviewed: Yes    Medications: Medication refills not needed today.  Pharmacy name entered into [employer]: [id number] - [address]    Frailty Screening:   Is the patient here for a new oncology consult visit in cancer care? 1. Yes. Over the past month, have you experienced difficulty or required a caregiver to assist with: "   1. Balance, walking or general mobility (including any falls)? YES  2. Completion of self-care tasks such as bathing, dressing, toileting, grooming/hygiene?  NO  3. Concentration or memory that affects your daily life?  NO       Clinical concerns: none       Adrianna Chan CMA                Again, thank you for allowing me to participate in the care of your patient.        Sincerely,        Lesli Yuan MD

## 2024-05-01 NOTE — PROGRESS NOTES
"Oncology Rooming Note    May 1, 2024 10:55 AM   Carolina García is a 79 year old female who presents for:    Chief Complaint   Patient presents with    Oncology Clinic Visit     New Non-Malignant Oncology-Acute deep vein thrombosis (DVT) of distal vein of right lower extremity.     Initial Vitals: BP (!) 145/70 (BP Location: Right arm, Patient Position: Sitting, Cuff Size: Adult Regular)   Pulse 109   Temp 98  F (36.7  C) (Oral)   Resp 16   Ht 1.702 m (5' 7\")   Wt 67.3 kg (148 lb 4.8 oz)   SpO2 97%   BMI 23.23 kg/m   Estimated body mass index is 23.23 kg/m  as calculated from the following:    Height as of this encounter: 1.702 m (5' 7\").    Weight as of this encounter: 67.3 kg (148 lb 4.8 oz). Body surface area is 1.78 meters squared.  No Pain (0) Comment: Data Unavailable   No LMP recorded. Patient has had a hysterectomy.  Allergies reviewed: Yes  Medications reviewed: Yes    Medications: Medication refills not needed today.  Pharmacy name entered into "[employer]: [id number] - [address]    Frailty Screening:   Is the patient here for a new oncology consult visit in cancer care? 1. Yes. Over the past month, have you experienced difficulty or required a caregiver to assist with:   1. Balance, walking or general mobility (including any falls)? YES  2. Completion of self-care tasks such as bathing, dressing, toileting, grooming/hygiene?  NO  3. Concentration or memory that affects your daily life?  NO       Clinical concerns: none       Adrianna Chan CMA              " 36.6

## 2024-05-09 ENCOUNTER — TRANSFERRED RECORDS (OUTPATIENT)
Dept: HEALTH INFORMATION MANAGEMENT | Facility: CLINIC | Age: 79
End: 2024-05-09
Payer: COMMERCIAL

## 2024-05-14 DIAGNOSIS — M35.01 SJOGREN'S SYNDROME WITH KERATOCONJUNCTIVITIS SICCA (H): ICD-10-CM

## 2024-05-14 RX ORDER — PILOCARPINE HYDROCHLORIDE 5 MG/1
5 TABLET, FILM COATED ORAL 2 TIMES DAILY
Qty: 180 TABLET | Refills: 0 | OUTPATIENT
Start: 2024-05-14

## 2024-05-14 RX ORDER — PILOCARPINE HYDROCHLORIDE 5 MG/1
5 TABLET, FILM COATED ORAL 2 TIMES DAILY
Qty: 180 TABLET | Refills: 0 | Status: SHIPPED | OUTPATIENT
Start: 2024-05-14 | End: 2024-08-10

## 2024-05-15 ENCOUNTER — TRANSFERRED RECORDS (OUTPATIENT)
Dept: HEALTH INFORMATION MANAGEMENT | Facility: CLINIC | Age: 79
End: 2024-05-15
Payer: COMMERCIAL

## 2024-05-15 LAB
ALT SERPL-CCNC: 30 IU/L (ref 0–32)
AST SERPL-CCNC: 78 IU/L (ref 0–40)

## 2024-06-12 ENCOUNTER — TRANSFERRED RECORDS (OUTPATIENT)
Dept: HEALTH INFORMATION MANAGEMENT | Facility: CLINIC | Age: 79
End: 2024-06-12
Payer: COMMERCIAL

## 2024-06-12 LAB
ALT SERPL-CCNC: 76 IU/L (ref 0–32)
AST SERPL-CCNC: 101 IU/L (ref 0–40)

## 2024-06-14 ENCOUNTER — LAB (OUTPATIENT)
Dept: LAB | Facility: HOSPITAL | Age: 79
End: 2024-06-14
Payer: COMMERCIAL

## 2024-06-14 DIAGNOSIS — I82.4Z1 ACUTE DEEP VEIN THROMBOSIS (DVT) OF DISTAL VEIN OF RIGHT LOWER EXTREMITY (H): ICD-10-CM

## 2024-06-14 PROCEDURE — 85730 THROMBOPLASTIN TIME PARTIAL: CPT

## 2024-06-14 PROCEDURE — 85390 FIBRINOLYSINS SCREEN I&R: CPT | Mod: 26 | Performed by: PATHOLOGY

## 2024-06-14 PROCEDURE — 36415 COLL VENOUS BLD VENIPUNCTURE: CPT

## 2024-06-17 LAB
DRVVT SCREEN RATIO: 0.79
INR PPP: 1.07 (ref 0.85–1.15)
LA PPP-IMP: NEGATIVE
LUPUS INTERPRETATION: NORMAL
PATH REPORT.COMMENTS IMP SPEC: NORMAL
PTT RATIO: 1.03
THROMBIN TIME: 17.6 SECONDS (ref 13–19)

## 2024-07-03 ENCOUNTER — ONCOLOGY VISIT (OUTPATIENT)
Dept: ONCOLOGY | Facility: HOSPITAL | Age: 79
End: 2024-07-03
Attending: INTERNAL MEDICINE
Payer: COMMERCIAL

## 2024-07-03 VITALS
RESPIRATION RATE: 16 BRPM | OXYGEN SATURATION: 98 % | DIASTOLIC BLOOD PRESSURE: 64 MMHG | HEART RATE: 101 BPM | WEIGHT: 154 LBS | TEMPERATURE: 98.1 F | SYSTOLIC BLOOD PRESSURE: 150 MMHG | BODY MASS INDEX: 24.17 KG/M2 | HEIGHT: 67 IN

## 2024-07-03 DIAGNOSIS — K74.60 CIRRHOSIS OF LIVER WITHOUT ASCITES, UNSPECIFIED HEPATIC CIRRHOSIS TYPE (H): ICD-10-CM

## 2024-07-03 DIAGNOSIS — M35.00 SJOGREN'S SYNDROME, WITH UNSPECIFIED ORGAN INVOLVEMENT (H): ICD-10-CM

## 2024-07-03 DIAGNOSIS — L93.1 SUBACUTE CUTANEOUS LUPUS ERYTHEMATOSUS: Primary | ICD-10-CM

## 2024-07-03 DIAGNOSIS — I82.461 ACUTE DEEP VEIN THROMBOSIS (DVT) OF CALF MUSCLE VEIN OF RIGHT LOWER EXTREMITY (H): ICD-10-CM

## 2024-07-03 PROCEDURE — G0463 HOSPITAL OUTPT CLINIC VISIT: HCPCS | Performed by: INTERNAL MEDICINE

## 2024-07-03 PROCEDURE — 99213 OFFICE O/P EST LOW 20 MIN: CPT | Performed by: INTERNAL MEDICINE

## 2024-07-03 ASSESSMENT — PAIN SCALES - GENERAL: PAINLEVEL: NO PAIN (0)

## 2024-07-03 NOTE — LETTER
7/3/2024      Carolina García  [address] [place] [address] [facility] [address]      Dear Colleague,    Thank you for referring your patient, Carolina García, to the [facility] [place]. Please see a copy of my visit note below.    [facility] Hematology and Oncology Progress Note    Patient: Carolina García  MRN: [id number]  7/03/24        Reason for Visit    Chief Complaint   Patient presents with     Hematology     Acute deep vein thrombocytosis          Problem List Items Addressed This Visit          Digestive    Cirrhosis of liver (H)       Immune    Subacute cutaneous lupus erythematosus - Primary    Sjogren's syndrome (H24)     Other Visit Diagnoses       Acute deep vein thrombosis (DVT) of calf muscle vein of right lower extremity (H)                  Assessment and Plan  Right lower extremity distal vein DVT  GI bleeding  She has done 3 months of anticoagulation.  Lab studies done at outside hospital in the past showed negative anticardiolipin and antibeta 2 glycoprotein antibody testing.  Lupus anticoagulant was not done.  So I asked her to get this test after finishing a total of 3 months of anticoagulation.      Reviewed the lab results from a couple weeks ago.Lupus anticoagulant test has come back negative.  This essentially rules out APLS.  She does have underlying rheumatological issues with SLE and cutaneous lupus with significantly elevated THEE.  Looks like she has been started on hydroxychloroquine.  She is also on chronic prednisone for autoimmune hepatitis.  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  She follows with GI.  Fortunately no evidence of any bleeding since her last visit.      Today I discussed anticoagulation in the setting of unprovoked distal vein DVT.  Explained to her that there is no clear role for indefinite anticoagulation in this setting especially in the setting of increased risk for GI bleeding.  So I think she can  probably go off of anticoagulation.  Watch closely for any DVT recurrence.  With her history of autoimmune disorder she is at higher risk for recurrent VTE and if she were to develop any new unprovoked clots then she will have to go on indefinite anticoagulation.  She understands.    I strongly encouraged wearing compression stockings on both the legs.    No further hematology follow-ups.     Cancer Staging   No matching staging information was found for the patient.      ECOG Performance    1 - Can't do physically strenuous work, but fully ambyulatory and can do light sedentary work         Problem List    Patient Active Problem List   Diagnosis     Anxiety     Hypertension     Rotator Cuff Tendonitis     Gastritis     Allergic Rhinitis     Menopause Has Occurred     Cervical radiculopathy at C5     Myelopathy, spondylogenic, cervical     Subacute cutaneous lupus erythematosus     Hypocomplementemia (H)     Sjogren's syndrome (H24)     Primary osteoarthritis involving multiple joints     THEE positive     Localized primary carpometacarpal osteoarthritis     Abnormal nuclear stress test     Precordial chest pain     Nonspecific abdominal pain     Nausea and vomiting     Diverticular disease of colon     Abnormal liver function tests     Cervical spine pain     Myofascial pain     Arthropathy of cervical facet joint     H/O cervical spine surgery     Cirrhosis of liver (H)     Nonalcoholic steatohepatitis        Oncology history  She presented to her PCP at [facility] on 2/26/2024 with right lower extremity swelling and pain along with weight loss and other concerns.  Documentation from [facility] at that time shows that she had right lower extremity swelling.  Also complained of shortness of breath.  An ultrasound of the lower extremity was ordered which came back showing DVT in the right soleal vein.  No evidence of any proximal DVT.  She was started on apixaban initial loading dose followed by 5 mg twice daily.   For her rheumatologic conditions that she was referred to rheumatology at [facility].  They initiated workup which included testing for APLS with history of recent DVT.  She also started her on high-dose prednisone with taper.  Anticardiolipin and antibeta-2 glycoprotein antibodies came back negative.  I do not see any lupus anticoagulant testing. Unfortunately after starting anticoagulation she has developed black stools and had a drop in hemoglobin.       Interval History   Carolina García is a 79 year old female with recent history of right lower extremity distal vein DVT currently on anticoagulation with Eliquis who is here to discuss further management.    Previously I saw her for what seems to be an unprovoked right lower extremity DVT.  Ultrasound done at outside hospital showed right soleal vein thrombosis.  She was started on anticoagulation with Eliquis.  Unfortunately developed GI bleed.  Underwent upper endoscopy on 4/30/2024.  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  She has done a total of 3 months of anticoagulation.  We obtained lupus anticoagulant testing recently.  She is here to discuss the results.  Denies any new bleeding issues.        Review of Systems  A comprehensive review of systems was negative except for what is noted in the interval history    Current Outpatient Medications   Medication Sig Dispense Refill     amLODIPine (NORVASC) 5 MG tablet TAKE 1 TABLET BY MOUTH EVERY DAY 90 tablet 3     baclofen (LIORESAL) 10 MG tablet TAKE 0.5-1 TABLETS (5-10 MG) BY MOUTH 2 TIMES DAILY AS NEEDED FOR MUSCLE SPASMS 180 tablet 1     CARBOXYMETHYLCELL/HYPROMELLOSE (GENTEAL GEL OPHT) Apply 2 drops to eye as needed       citalopram (CELEXA) 20 MG tablet TAKE 1 & 1/2 TABLETS BY MOUTH ONCE A  tablet 1     diphenhydrAMINE-acetaminophen (TYLENOL PM)  MG tablet Take 1 tablet by mouth nightly as needed for sleep       fluocinonide (LIDEX) 0.05 % external cream Apply topically 2 times daily        furosemide (LASIX) 20 MG tablet Take 2 tablets (40 mg) by mouth every morning 180 tablet 1     hydroxychloroquine (PLAQUENIL) 200 MG tablet Take 200 mg by mouth daily at 2 pm       loratadine (CLARITIN) 10 mg tablet [LORATADINE (CLARITIN) 10 MG TABLET] Take 10 mg by mouth daily.       losartan (COZAAR) 100 MG tablet TAKE 1 TABLET BY MOUTH EVERY DAY 90 tablet 3     mupirocin (BACTROBAN) 2 % external ointment Apply topically 2 times daily 30 g 0     OMEGA-3/DHA/EPA/FISH OIL (FISH OIL-OMEGA-3 FATTY ACIDS) 300-1,000 mg capsule [OMEGA-3/DHA/EPA/FISH OIL (FISH OIL-OMEGA-3 FATTY ACIDS) 300-1,000 MG CAPSULE] Take 2 g by mouth daily.       omeprazole (PRILOSEC) 20 MG capsule [OMEPRAZOLE (PRILOSEC) 20 MG CAPSULE] Take 2 capsules (40 mg total) by mouth 2 (two) times a day before meals. 120 capsule 1     pilocarpine (SALAGEN) 5 MG tablet Take 1 tablet (5 mg) by mouth 2 times daily 180 tablet 0     polyvinyl alcohol (LIQUIFILM TEARS) 1.4 % ophthalmic solution Place 1 drop into both eyes as needed       prednisoLONE acetate (PRED FORTE) 1 % ophthalmic suspension        predniSONE (DELTASONE) 5 MG tablet Take 30 mg by mouth daily       spironolactone (ALDACTONE) 25 MG tablet Take 50 mg by mouth daily       TYRVAYA 0.03 MG/ACT nasal spray        Ascorbic Acid (VITAMIN C) 500 MG CHEW Take 1,000 mg by mouth (Patient not taking: Reported on 4/2/2024)       multivitamin therapeutic tablet [MULTIVITAMIN THERAPEUTIC TABLET] Take 1 tablet by mouth daily. (Patient not taking: Reported on 5/1/2024)       RESTASIS MULTIDOSE 0.05 % ophthalmic emulsion Place 1 drop into both eyes 2 times daily (Patient not taking: Reported on 5/1/2024)       tiZANidine (ZANAFLEX) 4 MG tablet Take 1 tablet by mouth 3 times daily (Patient not taking: Reported on 5/1/2024)       valACYclovir (VALTREX) 1000 mg tablet Take 1 tablet by mouth 3 times daily (Patient not taking: Reported on 5/1/2024)       Current Facility-Administered Medications   Medication Dose  "Route Frequency Provider Last Rate Last Admin     ceFAZolin (ANCEF) 2 g in sodium chloride 0.9 % 100 mL intermittent infusion  2 g Intravenous Q8H MurphyLeonelDO 200 mL/hr at 12/04/23 1052 2 g at 12/04/23 1052        Physical Exam    Failed to redirect to the Timeline version of the REVFS SmartLink.    General: alert and cooperative  HEENT: Head: Normal, normocephalic, atraumatic.  Extremities: atraumatic, no peripheral edema  CNS: Alert and oriented x3, neurologic exam grossly normal.      Lab Results    No results found for this or any previous visit (from the past 168 hour(s)).    Imaging    No results found.      Signed by: Lesli Yuan MD      Oncology Rooming Note    July 3, 2024 10:33 AM   Carolina García is a 79 year old female who presents for:    Chief Complaint   Patient presents with     Hematology     Acute deep vein thrombocytosis      Initial Vitals: BP (!) 150/64   Pulse 101   Temp 98.1  F (36.7  C)   Resp 16   Ht 1.702 m (5' 7\")   Wt 69.9 kg (154 lb)   SpO2 98%   BMI 24.12 kg/m   Estimated body mass index is 24.12 kg/m  as calculated from the following:    Height as of this encounter: 1.702 m (5' 7\").    Weight as of this encounter: 69.9 kg (154 lb). Body surface area is 1.82 meters squared.  No Pain (0) Comment: Data Unavailable   No LMP recorded. Patient has had a hysterectomy.  Allergies reviewed: Yes  Medications reviewed: Yes    Medications: Medication refills not needed today.  Pharmacy name entered into [employer]: [id number] - [address]    Frailty Screening:   Is the patient here for a new oncology consult visit in cancer care? 2. No      Clinical concerns: None      Noelle Ferris LPN                 Again, thank you for allowing me to participate in the care of your patient.        Sincerely,        Lesli Yuan MD  "

## 2024-07-03 NOTE — PROGRESS NOTES
"Oncology Rooming Note    July 3, 2024 10:33 AM   Carolina García is a 79 year old female who presents for:    Chief Complaint   Patient presents with    Hematology     Acute deep vein thrombocytosis      Initial Vitals: BP (!) 150/64   Pulse 101   Temp 98.1  F (36.7  C)   Resp 16   Ht 1.702 m (5' 7\")   Wt 69.9 kg (154 lb)   SpO2 98%   BMI 24.12 kg/m   Estimated body mass index is 24.12 kg/m  as calculated from the following:    Height as of this encounter: 1.702 m (5' 7\").    Weight as of this encounter: 69.9 kg (154 lb). Body surface area is 1.82 meters squared.  No Pain (0) Comment: Data Unavailable   No LMP recorded. Patient has had a hysterectomy.  Allergies reviewed: Yes  Medications reviewed: Yes    Medications: Medication refills not needed today.  Pharmacy name entered into [employer]: [id number] [address]    Frailty Screening:   Is the patient here for a new oncology consult visit in cancer care? 2. No      Clinical concerns: None      Noelle Ferris LPN             "

## 2024-07-03 NOTE — PROGRESS NOTES
[facility] Hematology and Oncology Progress Note    Patient: Carolina García  MRN: [id number]  7/03/24        Reason for Visit    Chief Complaint   Patient presents with    Hematology     Acute deep vein thrombocytosis          Problem List Items Addressed This Visit          Digestive    Cirrhosis of liver (H)       Immune    Subacute cutaneous lupus erythematosus - Primary    Sjogren's syndrome (H24)     Other Visit Diagnoses       Acute deep vein thrombosis (DVT) of calf muscle vein of right lower extremity (H)                  Assessment and Plan  Right lower extremity distal vein DVT  GI bleeding  She has done 3 months of anticoagulation.  Lab studies done at outside hospital in the past showed negative anticardiolipin and antibeta 2 glycoprotein antibody testing.  Lupus anticoagulant was not done.  So I asked her to get this test after finishing a total of 3 months of anticoagulation.      Reviewed the lab results from a couple weeks ago.Lupus anticoagulant test has come back negative.  This essentially rules out APLS.  She does have underlying rheumatological issues with SLE and cutaneous lupus with significantly elevated THEE.  Looks like she has been started on hydroxychloroquine.  She is also on chronic prednisone for autoimmune hepatitis.  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  She follows with GI.  Fortunately no evidence of any bleeding since her last visit.      Today I discussed anticoagulation in the setting of unprovoked distal vein DVT.  Explained to her that there is no clear role for indefinite anticoagulation in this setting especially in the setting of increased risk for GI bleeding.  So I think she can probably go off of anticoagulation.  Watch closely for any DVT recurrence.  With her history of autoimmune disorder she is at higher risk for recurrent VTE and if she were to develop any new unprovoked clots then she will have to go on  indefinite anticoagulation.  She understands.    I strongly encouraged wearing compression stockings on both the legs.    No further hematology follow-ups.     Cancer Staging   No matching staging information was found for the patient.      ECOG Performance    1 - Can't do physically strenuous work, but fully ambyulatory and can do light sedentary work         Problem List    Patient Active Problem List   Diagnosis    Anxiety    Hypertension    Rotator Cuff Tendonitis    Gastritis    Allergic Rhinitis    Menopause Has Occurred    Cervical radiculopathy at C5    Myelopathy, spondylogenic, cervical    Subacute cutaneous lupus erythematosus    Hypocomplementemia (H)    Sjogren's syndrome (H24)    Primary osteoarthritis involving multiple joints    THEE positive    Localized primary carpometacarpal osteoarthritis    Abnormal nuclear stress test    Precordial chest pain    Nonspecific abdominal pain    Nausea and vomiting    Diverticular disease of colon    Abnormal liver function tests    Cervical spine pain    Myofascial pain    Arthropathy of cervical facet joint    H/O cervical spine surgery    Cirrhosis of liver (H)    Nonalcoholic steatohepatitis        Oncology history  She presented to her PCP at [facility] on 2/26/2024 with right lower extremity swelling and pain along with weight loss and other concerns.  Documentation from [facility] at that time shows that she had right lower extremity swelling.  Also complained of shortness of breath.  An ultrasound of the lower extremity was ordered which came back showing DVT in the right soleal vein.  No evidence of any proximal DVT.  She was started on apixaban initial loading dose followed by 5 mg twice daily.  For her rheumatologic conditions that she was referred to rheumatology at [facility].  They initiated workup which included testing for APLS with history of recent DVT.  She also started her on high-dose prednisone with taper.  Anticardiolipin and antibeta-2  glycoprotein antibodies came back negative.  I do not see any lupus anticoagulant testing. Unfortunately after starting anticoagulation she has developed black stools and had a drop in hemoglobin.       Interval History   Carolina García is a 79 year old female with recent history of right lower extremity distal vein DVT currently on anticoagulation with Eliquis who is here to discuss further management.    Previously I saw her for what seems to be an unprovoked right lower extremity DVT.  Ultrasound done at outside hospital showed right soleal vein thrombosis.  She was started on anticoagulation with Eliquis.  Unfortunately developed GI bleed.  Underwent upper endoscopy on 4/30/2024.  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  She has done a total of 3 months of anticoagulation.  We obtained lupus anticoagulant testing recently.  She is here to discuss the results.  Denies any new bleeding issues.        Review of Systems  A comprehensive review of systems was negative except for what is noted in the interval history    Current Outpatient Medications   Medication Sig Dispense Refill    amLODIPine (NORVASC) 5 MG tablet TAKE 1 TABLET BY MOUTH EVERY DAY 90 tablet 3    baclofen (LIORESAL) 10 MG tablet TAKE 0.5-1 TABLETS (5-10 MG) BY MOUTH 2 TIMES DAILY AS NEEDED FOR MUSCLE SPASMS 180 tablet 1    CARBOXYMETHYLCELL/HYPROMELLOSE (GENTEAL GEL OPHT) Apply 2 drops to eye as needed      citalopram (CELEXA) 20 MG tablet TAKE 1 & 1/2 TABLETS BY MOUTH ONCE A  tablet 1    diphenhydrAMINE-acetaminophen (TYLENOL PM)  MG tablet Take 1 tablet by mouth nightly as needed for sleep      fluocinonide (LIDEX) 0.05 % external cream Apply topically 2 times daily      furosemide (LASIX) 20 MG tablet Take 2 tablets (40 mg) by mouth every morning 180 tablet 1    hydroxychloroquine (PLAQUENIL) 200 MG tablet Take 200 mg by mouth daily at 2 pm      loratadine (CLARITIN) 10 mg tablet [LORATADINE (CLARITIN) 10 MG TABLET] Take 10 mg  by mouth daily.      losartan (COZAAR) 100 MG tablet TAKE 1 TABLET BY MOUTH EVERY DAY 90 tablet 3    mupirocin (BACTROBAN) 2 % external ointment Apply topically 2 times daily 30 g 0    OMEGA-3/DHA/EPA/FISH OIL (FISH OIL-OMEGA-3 FATTY ACIDS) 300-1,000 mg capsule [OMEGA-3/DHA/EPA/FISH OIL (FISH OIL-OMEGA-3 FATTY ACIDS) 300-1,000 MG CAPSULE] Take 2 g by mouth daily.      omeprazole (PRILOSEC) 20 MG capsule [OMEPRAZOLE (PRILOSEC) 20 MG CAPSULE] Take 2 capsules (40 mg total) by mouth 2 (two) times a day before meals. 120 capsule 1    pilocarpine (SALAGEN) 5 MG tablet Take 1 tablet (5 mg) by mouth 2 times daily 180 tablet 0    polyvinyl alcohol (LIQUIFILM TEARS) 1.4 % ophthalmic solution Place 1 drop into both eyes as needed      prednisoLONE acetate (PRED FORTE) 1 % ophthalmic suspension       predniSONE (DELTASONE) 5 MG tablet Take 30 mg by mouth daily      spironolactone (ALDACTONE) 25 MG tablet Take 50 mg by mouth daily      TYRVAYA 0.03 MG/ACT nasal spray       Ascorbic Acid (VITAMIN C) 500 MG CHEW Take 1,000 mg by mouth (Patient not taking: Reported on 4/2/2024)      multivitamin therapeutic tablet [MULTIVITAMIN THERAPEUTIC TABLET] Take 1 tablet by mouth daily. (Patient not taking: Reported on 5/1/2024)      RESTASIS MULTIDOSE 0.05 % ophthalmic emulsion Place 1 drop into both eyes 2 times daily (Patient not taking: Reported on 5/1/2024)      tiZANidine (ZANAFLEX) 4 MG tablet Take 1 tablet by mouth 3 times daily (Patient not taking: Reported on 5/1/2024)      valACYclovir (VALTREX) 1000 mg tablet Take 1 tablet by mouth 3 times daily (Patient not taking: Reported on 5/1/2024)       Current Facility-Administered Medications   Medication Dose Route Frequency Provider Last Rate Last Admin    ceFAZolin (ANCEF) 2 g in sodium chloride 0.9 % 100 mL intermittent infusion  2 g Intravenous Q8H Leonel Arrington  mL/hr at 12/04/23 1052 2 g at 12/04/23 1052        Physical Exam    Failed to redirect to the Timeline  version of the RE[employer]FS SmartLink.    General: alert and cooperative  HEENT: Head: Normal, normocephalic, atraumatic.  Extremities: atraumatic, no peripheral edema  CNS: Alert and oriented x3, neurologic exam grossly normal.      Lab Results    No results found for this or any previous visit (from the past 168 hour(s)).    Imaging    No results found.      Signed by: Lesli Yuan MD

## 2024-07-09 ENCOUNTER — LAB (OUTPATIENT)
Dept: LAB | Facility: CLINIC | Age: 79
End: 2024-07-09
Payer: COMMERCIAL

## 2024-07-09 DIAGNOSIS — Z00.00 ROUTINE GENERAL MEDICAL EXAMINATION AT A HEALTH CARE FACILITY: Primary | ICD-10-CM

## 2024-08-04 DIAGNOSIS — F41.1 ANXIETY STATE: ICD-10-CM

## 2024-08-05 RX ORDER — CITALOPRAM HYDROBROMIDE 20 MG/1
TABLET ORAL
Qty: 135 TABLET | Refills: 1 | Status: SHIPPED | OUTPATIENT
Start: 2024-08-05

## 2024-08-09 ENCOUNTER — TRANSFERRED RECORDS (OUTPATIENT)
Dept: HEALTH INFORMATION MANAGEMENT | Facility: CLINIC | Age: 79
End: 2024-08-09
Payer: COMMERCIAL

## 2024-08-10 DIAGNOSIS — M35.01 SJOGREN'S SYNDROME WITH KERATOCONJUNCTIVITIS SICCA (H): ICD-10-CM

## 2024-08-12 ENCOUNTER — TRANSFERRED RECORDS (OUTPATIENT)
Dept: HEALTH INFORMATION MANAGEMENT | Facility: CLINIC | Age: 79
End: 2024-08-12
Payer: COMMERCIAL

## 2024-08-12 RX ORDER — PILOCARPINE HYDROCHLORIDE 5 MG/1
5 TABLET, FILM COATED ORAL 2 TIMES DAILY
Qty: 180 TABLET | Refills: 0 | Status: SHIPPED | OUTPATIENT
Start: 2024-08-12

## 2024-08-12 NOTE — TELEPHONE ENCOUNTER
3/28:  Plan:      Follow up with [facility].  Okay to continue to see me for interim visits if [place] would like, otherwise follow-up with the [facility] per their recommendations.

## 2024-09-04 ENCOUNTER — TRANSFERRED RECORDS (OUTPATIENT)
Dept: HEALTH INFORMATION MANAGEMENT | Facility: CLINIC | Age: 79
End: 2024-09-04
Payer: COMMERCIAL

## 2024-09-05 DIAGNOSIS — K75.81 LIVER CIRRHOSIS SECONDARY TO NONALCOHOLIC STEATOHEPATITIS (NASH) (H): Primary | ICD-10-CM

## 2024-09-05 DIAGNOSIS — K74.60 LIVER CIRRHOSIS SECONDARY TO NONALCOHOLIC STEATOHEPATITIS (NASH) (H): Primary | ICD-10-CM

## 2024-09-05 DIAGNOSIS — K74.60 CIRRHOSIS (H): ICD-10-CM

## 2024-09-20 ENCOUNTER — TRANSFERRED RECORDS (OUTPATIENT)
Dept: HEALTH INFORMATION MANAGEMENT | Facility: CLINIC | Age: 79
End: 2024-09-20
Payer: COMMERCIAL

## 2024-09-21 DIAGNOSIS — M79.18 MYOFASCIAL PAIN: ICD-10-CM

## 2024-09-23 ENCOUNTER — HOSPITAL ENCOUNTER (OUTPATIENT)
Dept: ULTRASOUND IMAGING | Facility: HOSPITAL | Age: 79
Discharge: HOME | End: 2024-09-23
Attending: INTERNAL MEDICINE
Payer: COMMERCIAL

## 2024-09-23 ENCOUNTER — LAB (OUTPATIENT)
Dept: LAB | Facility: HOSPITAL | Age: 79
End: 2024-09-23
Attending: INTERNAL MEDICINE
Payer: COMMERCIAL

## 2024-09-23 DIAGNOSIS — K75.81 NASH (NONALCOHOLIC STEATOHEPATITIS): ICD-10-CM

## 2024-09-23 DIAGNOSIS — I10 ESSENTIAL HYPERTENSION: ICD-10-CM

## 2024-09-23 DIAGNOSIS — R63.0 DECREASED APPETITE: ICD-10-CM

## 2024-09-23 DIAGNOSIS — R63.4 WEIGHT LOSS: ICD-10-CM

## 2024-09-23 LAB
ALBUMIN SERPL BCG-MCNC: 3.9 G/DL (ref 3.5–5.2)
ALP SERPL-CCNC: 83 U/L (ref 40–150)
ALT SERPL W P-5'-P-CCNC: 94 U/L (ref 0–50)
ANION GAP SERPL CALCULATED.3IONS-SCNC: 10 MMOL/L (ref 7–15)
AST SERPL W P-5'-P-CCNC: 70 U/L (ref 0–45)
BILIRUB SERPL-MCNC: 1 MG/DL
BUN SERPL-MCNC: 13.8 MG/DL (ref 8–23)
CALCIUM SERPL-MCNC: 8.5 MG/DL (ref 8.8–10.4)
CHLORIDE SERPL-SCNC: 98 MMOL/L (ref 98–107)
CREAT SERPL-MCNC: 0.94 MG/DL (ref 0.51–0.95)
EGFRCR SERPLBLD CKD-EPI 2021: 62 ML/MIN/1.73M2
GLUCOSE SERPL-MCNC: 73 MG/DL (ref 70–99)
HCO3 SERPL-SCNC: 29 MMOL/L (ref 22–29)
POTASSIUM SERPL-SCNC: 3.5 MMOL/L (ref 3.4–5.3)
PROT SERPL-MCNC: 6.9 G/DL (ref 6.4–8.3)
SODIUM SERPL-SCNC: 137 MMOL/L (ref 135–145)

## 2024-09-23 PROCEDURE — 36415 COLL VENOUS BLD VENIPUNCTURE: CPT

## 2024-09-23 PROCEDURE — 76705 ECHO EXAM OF ABDOMEN: CPT

## 2024-09-23 PROCEDURE — 82040 ASSAY OF SERUM ALBUMIN: CPT

## 2024-09-23 RX ORDER — BACLOFEN 10 MG/1
5-10 TABLET ORAL 2 TIMES DAILY PRN
Qty: 180 TABLET | Refills: 1 | Status: SHIPPED | OUTPATIENT
Start: 2024-09-23

## 2024-09-27 PROBLEM — K29.40 ATROPHIC GASTRITIS: Status: ACTIVE | Noted: 2024-05-03

## 2024-09-27 PROBLEM — K75.4 AUTOIMMUNE HEPATITIS (H): Status: ACTIVE | Noted: 2024-05-15

## 2024-09-27 PROBLEM — K31.7 POLYP OF DUODENUM: Status: ACTIVE | Noted: 2024-05-03

## 2024-09-27 PROBLEM — D50.9 IRON DEFICIENCY ANEMIA: Status: ACTIVE | Noted: 2024-04-30

## 2024-09-27 RX ORDER — AZATHIOPRINE 50 MG/1
1 TABLET ORAL
COMMUNITY
Start: 2024-07-10

## 2024-09-30 ENCOUNTER — TRANSFERRED RECORDS (OUTPATIENT)
Dept: HEALTH INFORMATION MANAGEMENT | Facility: CLINIC | Age: 79
End: 2024-09-30
Payer: COMMERCIAL

## 2024-10-02 ENCOUNTER — LAB (OUTPATIENT)
Dept: LAB | Facility: CLINIC | Age: 79
End: 2024-10-02
Payer: COMMERCIAL

## 2024-10-04 ENCOUNTER — OFFICE VISIT (OUTPATIENT)
Dept: FAMILY MEDICINE | Facility: CLINIC | Age: 79
End: 2024-10-04
Payer: COMMERCIAL

## 2024-10-04 ENCOUNTER — ANCILLARY PROCEDURE (OUTPATIENT)
Dept: GENERAL RADIOLOGY | Facility: CLINIC | Age: 79
End: 2024-10-04
Attending: FAMILY MEDICINE
Payer: COMMERCIAL

## 2024-10-04 VITALS
OXYGEN SATURATION: 99 % | DIASTOLIC BLOOD PRESSURE: 90 MMHG | WEIGHT: 174 LBS | HEART RATE: 89 BPM | TEMPERATURE: 98.2 F | SYSTOLIC BLOOD PRESSURE: 156 MMHG | RESPIRATION RATE: 18 BRPM | BODY MASS INDEX: 27.31 KG/M2 | HEIGHT: 67 IN

## 2024-10-04 DIAGNOSIS — Z00.00 MEDICARE ANNUAL WELLNESS VISIT, SUBSEQUENT: Primary | ICD-10-CM

## 2024-10-04 DIAGNOSIS — R53.83 OTHER FATIGUE: ICD-10-CM

## 2024-10-04 DIAGNOSIS — D64.9 ANEMIA, UNSPECIFIED TYPE: ICD-10-CM

## 2024-10-04 DIAGNOSIS — I10 ESSENTIAL HYPERTENSION: ICD-10-CM

## 2024-10-04 DIAGNOSIS — G57.93 NEUROPATHY OF BOTH FEET: ICD-10-CM

## 2024-10-04 DIAGNOSIS — R06.09 DOE (DYSPNEA ON EXERTION): ICD-10-CM

## 2024-10-04 DIAGNOSIS — M35.01 SJOGREN'S SYNDROME WITH KERATOCONJUNCTIVITIS SICCA (H): ICD-10-CM

## 2024-10-04 DIAGNOSIS — Z98.84 S/P GASTRIC BYPASS: ICD-10-CM

## 2024-10-04 DIAGNOSIS — K74.60 CIRRHOSIS OF LIVER WITHOUT ASCITES, UNSPECIFIED HEPATIC CIRRHOSIS TYPE (H): ICD-10-CM

## 2024-10-04 DIAGNOSIS — D50.9 IRON DEFICIENCY ANEMIA, UNSPECIFIED IRON DEFICIENCY ANEMIA TYPE: ICD-10-CM

## 2024-10-04 DIAGNOSIS — I82.4Z1 ACUTE DEEP VEIN THROMBOSIS (DVT) OF DISTAL VEIN OF RIGHT LOWER EXTREMITY (H): ICD-10-CM

## 2024-10-04 LAB
BASOPHILS # BLD AUTO: 0 10E3/UL (ref 0–0.2)
BASOPHILS NFR BLD AUTO: 0 %
EOSINOPHIL # BLD AUTO: 0 10E3/UL (ref 0–0.7)
EOSINOPHIL NFR BLD AUTO: 0 %
ERYTHROCYTE [DISTWIDTH] IN BLOOD BY AUTOMATED COUNT: 13.4 % (ref 10–15)
HCT VFR BLD AUTO: 42.3 % (ref 35–47)
HGB BLD-MCNC: 14 G/DL (ref 11.7–15.7)
IMM GRANULOCYTES # BLD: 0 10E3/UL
IMM GRANULOCYTES NFR BLD: 0 %
LYMPHOCYTES # BLD AUTO: 0.5 10E3/UL (ref 0.8–5.3)
LYMPHOCYTES NFR BLD AUTO: 5 %
MCH RBC QN AUTO: 32.3 PG (ref 26.5–33)
MCHC RBC AUTO-ENTMCNC: 33.1 G/DL (ref 31.5–36.5)
MCV RBC AUTO: 98 FL (ref 78–100)
MONOCYTES # BLD AUTO: 0.4 10E3/UL (ref 0–1.3)
MONOCYTES NFR BLD AUTO: 4 %
NEUTROPHILS # BLD AUTO: 9.6 10E3/UL (ref 1.6–8.3)
NEUTROPHILS NFR BLD AUTO: 91 %
NRBC # BLD AUTO: 0 10E3/UL
NRBC BLD AUTO-RTO: 0 /100
PLATELET # BLD AUTO: 170 10E3/UL (ref 150–450)
RBC # BLD AUTO: 4.34 10E6/UL (ref 3.8–5.2)
WBC # BLD AUTO: 10.6 10E3/UL (ref 4–11)

## 2024-10-04 PROCEDURE — 90480 ADMN SARSCOV2 VAC 1/ONLY CMP: CPT | Performed by: FAMILY MEDICINE

## 2024-10-04 PROCEDURE — 71046 X-RAY EXAM CHEST 2 VIEWS: CPT | Mod: TC | Performed by: RADIOLOGY

## 2024-10-04 PROCEDURE — 93010 ELECTROCARDIOGRAM REPORT: CPT | Performed by: INTERNAL MEDICINE

## 2024-10-04 PROCEDURE — 91320 SARSCV2 VAC 30MCG TRS-SUC IM: CPT | Performed by: FAMILY MEDICINE

## 2024-10-04 PROCEDURE — G0439 PPPS, SUBSEQ VISIT: HCPCS | Performed by: FAMILY MEDICINE

## 2024-10-04 PROCEDURE — 82607 VITAMIN B-12: CPT | Performed by: FAMILY MEDICINE

## 2024-10-04 PROCEDURE — 93005 ELECTROCARDIOGRAM TRACING: CPT | Performed by: FAMILY MEDICINE

## 2024-10-04 PROCEDURE — 80053 COMPREHEN METABOLIC PANEL: CPT | Performed by: FAMILY MEDICINE

## 2024-10-04 PROCEDURE — 85025 COMPLETE CBC W/AUTO DIFF WBC: CPT | Performed by: FAMILY MEDICINE

## 2024-10-04 PROCEDURE — 36415 COLL VENOUS BLD VENIPUNCTURE: CPT | Performed by: FAMILY MEDICINE

## 2024-10-04 PROCEDURE — 99214 OFFICE O/P EST MOD 30 MIN: CPT | Mod: 25 | Performed by: FAMILY MEDICINE

## 2024-10-04 PROCEDURE — 84443 ASSAY THYROID STIM HORMONE: CPT | Performed by: FAMILY MEDICINE

## 2024-10-04 SDOH — HEALTH STABILITY: PHYSICAL HEALTH: ON AVERAGE, HOW MANY DAYS PER WEEK DO YOU ENGAGE IN MODERATE TO STRENUOUS EXERCISE (LIKE A BRISK WALK)?: 1 DAY

## 2024-10-04 ASSESSMENT — PAIN SCALES - GENERAL: PAINLEVEL: MODERATE PAIN (4)

## 2024-10-04 ASSESSMENT — SOCIAL DETERMINANTS OF HEALTH (SDOH): HOW OFTEN DO YOU GET TOGETHER WITH FRIENDS OR RELATIVES?: ONCE A WEEK

## 2024-10-04 NOTE — PROGRESS NOTES
Preventive Care Visit  [facility]  Rich Kelly MD, MD, Family Medicine  Oct 4, 2024      Adelina was seen today for physical.    Diagnoses and all orders for this visit:    Medicare annual wellness visit, subsequent    GUZMAN (dyspnea on exertion)  -     CBC with Platelets & Differential; Future  -     EKG 12-lead, tracing only  -     XR Chest 2 Views; Future  -     CBC with Platelets & Differential    Iron deficiency anemia, unspecified iron deficiency anemia type  -     CBC with Platelets & Differential; Future  -     CBC with Platelets & Differential    Neuropathy of both feet  -     TSH with free T4 reflex; Future  -     Vitamin B12; Future  -     TSH with free T4 reflex  -     Vitamin B12    Other fatigue  -     TSH with free T4 reflex; Future  -     Vitamin B12; Future  -     TSH with free T4 reflex  -     Vitamin B12    Cirrhosis of liver without ascites, unspecified hepatic cirrhosis type (H)    Essential hypertension  -     Comprehensive metabolic panel; Future  -     Comprehensive metabolic panel    S/P gastric bypass  -     Comprehensive metabolic panel; Future  -     Comprehensive metabolic panel    Sjogren's syndrome with keratoconjunctivitis sicca (H)    Acute deep vein thrombosis (DVT) of distal vein of right lower extremity (H)    Anemia, unspecified type    Other orders  -     COVID-19 12+ (PFIZER)    Chest x-ray and EKG are obtained neither of which show concern.  Hemoglobin is normal.  We will arrange for a stress test to evaluate for potential MI and cardiac disease as a cause of shortness of breath.  From there we will consider other further action.  Will need to arrange for short-term follow-up for reassessment.  Symptoms are generally reported as stable.  Discussed with patient if symptoms become more concerning in any way then she would need a more emergent evaluation.  Will also plan to consider potential further evaluation of what she describes as heartburn.  Into  symptoms that she describes shortness of breath and heartburn do not coincide in terms of inciting and alleviating factors.  We spent some time discussing all of this.      Nehal Sahu is a 79 year old, presenting for the following:  Physical (Short of breath - numbness in leg right and toes are numb in both feet - fungus growing -)        10/4/2024    11:25 AM   Additional Questions   Roomed by deborah         10/4/2024   Forms   Any forms needing to be completed Yes            Health Care Directive  Patient does not have a Health Care Directive or Living Will: Patient states has Advance Directive and will bring in a copy to clinic.    HPI    She is a complex medical history that includes autoimmune disorder, she has Sjogren syndrome and lupus.  She follows with rheumatology.  She has cirrhosis of the liver has been following closely with gastroenterology.  This past year she had a DVT.  It was determined in consultation with hematology that she did not require ongoing anticoagulation.  She does not have signs or symptoms that would suggest that she has recurrent DVT.  She deals with neuropathy in both feet.  She has a history of iron deficiency anemia.    She reports she is short of breath.  She states that when she exerts herself she becomes short of breath but does not have chest pain or chest tightness.  At rest she does not have concerns.  She is reporting occasional heartburn that is associated with eating.  She is on acid reduction medication with omeprazole taken twice daily.  She does not report signs or symptoms of gastrointestinal bleeding.  No dark stool, no hematemesis hematochezia melena.          10/4/2024   General Health   How would you rate your overall physical health? Good   Feel stress (tense, anxious, or unable to sleep) Only a little      (!) STRESS CONCERN      10/4/2024   Nutrition   Diet: Regular (no restrictions)            10/4/2024   Exercise   Days per week of moderate/strenous  exercise 1 day      (!) EXERCISE CONCERN      10/4/2024   Social Factors   Frequency of gathering with friends or relatives Once a week   Worry food won't last until get money to buy more No   Food not last or not have enough money for food? No   Do you have housing? (Housing is defined as stable permanent housing and does not include staying ouside in a car, in a tent, in an abandoned building, in an overnight shelter, or couch-surfing.) No   Are you worried about losing your housing? No   Lack of transportation? No   Unable to get utilities (heat,electricity)? No   Want help with housing or utility concern? (!) YES      (!) HOUSING CONCERN PRESENT      10/4/2024   Fall Risk   Fallen 2 or more times in the past year? No   Trouble with walking or balance? Yes   Gait Speed Test (Document in seconds) 5             10/4/2024   Activities of Daily Living- Home Safety   Needs help with the following daily activites None of the above   Safety concerns in the home None of the above            10/4/2024   Dental   Dentist two times every year? Yes            10/4/2024   Hearing Screening   Hearing concerns? None of the above            10/4/2024   Driving Risk Screening   Patient/family members have concerns about driving No            10/4/2024   General Alertness/Fatigue Screening   Have you been more tired than usual lately? (!) YES            10/4/2024   Urinary Incontinence Screening   Bothered by leaking urine in past 6 months Yes            10/4/2024   TB Screening   Were you born outside of the [place]? No                  10/4/2024   Substance Use   Alcohol more than 3/day or more than 7/wk No   Do you have a current opioid prescription? No   How severe/bad is pain from 1 to 10? 4/10   Do you use any other substances recreationally? No        Social History     Tobacco Use    Smoking status: Never     Passive exposure: Past    Smokeless tobacco: Never   Vaping Use    Vaping status: Never Used   Substance Use Topics     Alcohol use: Yes     Comment: Alcoholic Drinks/day: glass of wine on occasion    Drug use: No           11/29/2021   LAST FHS-7 RESULTS   1st degree relative breast or ovarian cancer Yes   Any relative bilateral breast cancer No   Any male have breast cancer No   Any ONE woman have BOTH breast AND ovarian cancer Yes   Any woman with breast cancer before 50yrs No   2 or more relatives with breast AND/OR ovarian cancer Yes   2 or more relatives with breast AND/OR bowel cancer Yes               ASCVD Risk   The 10-year ASCVD risk score (Willem MG, et al., 2019) is: 33.7%    Values used to calculate the score:      Age: 79 years      Sex: Female      Is Non- : No      Diabetic: No      Tobacco smoker: No      Systolic Blood Pressure: 150 mmHg      Is BP treated: Yes      HDL Cholesterol: 61 mg/dL      Total Cholesterol: 151 mg/dL            Reviewed and updated as needed this visit by Provider                      Current providers sharing in care for this patient include:  Patient Care Team:  Rich Kelly MD as PCP - General  Rich Kelly MD as Assigned PCP  Saranya Law PA-C as Assigned Rheumatology Provider  Haim Shaffer DO as Assigned Neuroscience Provider  Ashlee Mera PA-C as Assigned Musculoskeletal Provider  Lesli Yuan MD as MD (Medical Oncology)  Lesli Yuan MD as Assigned Cancer Care Provider    The following health maintenance items are reviewed in Epic and correct as of today:  Health Maintenance   Topic Date Due    ANNUAL REVIEW OF  ORDERS  Never done    MENINGITIS IMMUNIZATION (1 - Risk 2-dose series) Never done    ZOSTER IMMUNIZATION (1 of 2) Never done    RSV VACCINE (1 - 1-dose 75+ series) Never done    INFLUENZA VACCINE (1) Never done    COVID-19 Vaccine (6 - 2024-25 season) 09/01/2024    MEDICARE ANNUAL WELLNESS VISIT  10/03/2024    BMP  09/23/2025    FALL RISK ASSESSMENT  10/04/2025    GLUCOSE  09/23/2027    ADVANCE CARE  "PLANNING  10/03/2028    LIPID  03/18/2029    DTAP/TDAP/TD IMMUNIZATION (4 - Td or Tdap) 11/15/2031    DEXA  12/07/2036    HEPATITIS C SCREENING  Completed    PHQ-2 (once per calendar year)  Completed    Pneumococcal Vaccine: 65+ Years  Completed    HEPATITIS A IMMUNIZATION  Completed    HEPATITIS B IMMUNIZATION  Completed    HPV IMMUNIZATION  Aged Out    RSV MONOCLONAL ANTIBODY  Aged Out    MAMMO SCREENING  Discontinued    COLORECTAL CANCER SCREENING  Discontinued       Complete review of systems is obtained.  Other than the specific considerations noted above complete review of systems is negative.       Objective    Exam  There were no vitals taken for this visit.   Estimated body mass index is 24.12 kg/m  as calculated from the following:    Height as of 7/3/24: 1.702 m (5' 7\").    Weight as of 7/3/24: 69.9 kg (154 lb).    Physical Exam        General Appearance:    Alert, cooperative, no distress   Eyes:   No scleral icterus or conjunctival irritation       Ears:    Normal TM's and external ear canals, both ears   Throat:   Lips, mucosa, and tongue normal; teeth and gums normal   Neck:   Supple, symmetrical, trachea midline, no adenopathy;        thyroid:  No enlargement/tenderness/nodules   Lungs:     Clear to auscultation bilaterally, respirations unlabored, no wheezes or crackles   Heart:    Regular rate and rhythm,  No murmur   Abdomen:    Soft, no distention, no tenderness on palpation, no masses, no organomegaly     Extremities:  No edema, no joint swelling or redness, no evidence of any injuries   Skin:  No concerning skin findings, no suspicious moles, no rashes   Neurologic:  On gross examination there is no motor or sensory deficit.  Patient walks with a normal gait                   10/4/2024   Mini Cog   Clock Draw Score 2 Normal   3 Item Recall 2 objects recalled   Mini Cog Total Score 4                 Signed Electronically by: Rich Kelly MD, MD    "

## 2024-10-05 LAB
ALBUMIN SERPL BCG-MCNC: 4 G/DL (ref 3.5–5.2)
ALP SERPL-CCNC: 79 U/L (ref 40–150)
ALT SERPL W P-5'-P-CCNC: 107 U/L (ref 0–50)
ANION GAP SERPL CALCULATED.3IONS-SCNC: 12 MMOL/L (ref 7–15)
AST SERPL W P-5'-P-CCNC: 75 U/L (ref 0–45)
BILIRUB SERPL-MCNC: 1.7 MG/DL
BUN SERPL-MCNC: 14.2 MG/DL (ref 8–23)
CALCIUM SERPL-MCNC: 8.7 MG/DL (ref 8.8–10.4)
CHLORIDE SERPL-SCNC: 97 MMOL/L (ref 98–107)
CREAT SERPL-MCNC: 0.97 MG/DL (ref 0.51–0.95)
EGFRCR SERPLBLD CKD-EPI 2021: 60 ML/MIN/1.73M2
GLUCOSE SERPL-MCNC: 91 MG/DL (ref 70–99)
HCO3 SERPL-SCNC: 27 MMOL/L (ref 22–29)
POTASSIUM SERPL-SCNC: 3.7 MMOL/L (ref 3.4–5.3)
PROT SERPL-MCNC: 7.2 G/DL (ref 6.4–8.3)
SODIUM SERPL-SCNC: 136 MMOL/L (ref 135–145)
TSH SERPL DL<=0.005 MIU/L-ACNC: 3.09 UIU/ML (ref 0.3–4.2)
VIT B12 SERPL-MCNC: 449 PG/ML (ref 232–1245)

## 2024-10-10 LAB
ATRIAL RATE - MUSE: 89 BPM
DIASTOLIC BLOOD PRESSURE - MUSE: NORMAL MMHG
INTERPRETATION ECG - MUSE: NORMAL
P AXIS - MUSE: 16 DEGREES
PR INTERVAL - MUSE: 162 MS
QRS DURATION - MUSE: 88 MS
QT - MUSE: 374 MS
QTC - MUSE: 455 MS
R AXIS - MUSE: 0 DEGREES
SYSTOLIC BLOOD PRESSURE - MUSE: NORMAL MMHG
T AXIS - MUSE: 43 DEGREES
VENTRICULAR RATE- MUSE: 89 BPM

## 2024-10-15 ENCOUNTER — HOSPITAL ENCOUNTER (OUTPATIENT)
Dept: NUCLEAR MEDICINE | Facility: HOSPITAL | Age: 79
Discharge: HOME | End: 2024-10-15
Attending: FAMILY MEDICINE
Payer: COMMERCIAL

## 2024-10-15 ENCOUNTER — HOSPITAL ENCOUNTER (OUTPATIENT)
Dept: CARDIOLOGY | Facility: HOSPITAL | Age: 79
Discharge: HOME | End: 2024-10-15
Attending: FAMILY MEDICINE
Payer: COMMERCIAL

## 2024-10-15 DIAGNOSIS — R06.09 DOE (DYSPNEA ON EXERTION): ICD-10-CM

## 2024-10-15 LAB
NUC STRESS EJECTION FRACTION: 60 %
STRESS ECHO TARGET HR: 143

## 2024-10-15 PROCEDURE — A9500 TC99M SESTAMIBI: HCPCS | Performed by: FAMILY MEDICINE

## 2024-10-15 PROCEDURE — 78452 HT MUSCLE IMAGE SPECT MULT: CPT | Mod: 26 | Performed by: INTERNAL MEDICINE

## 2024-10-15 PROCEDURE — 93017 CV STRESS TEST TRACING ONLY: CPT

## 2024-10-15 PROCEDURE — 250N000011 HC RX IP 250 OP 636: Performed by: FAMILY MEDICINE

## 2024-10-15 PROCEDURE — 93018 CV STRESS TEST I&R ONLY: CPT | Performed by: INTERNAL MEDICINE

## 2024-10-15 PROCEDURE — 93016 CV STRESS TEST SUPVJ ONLY: CPT | Performed by: INTERNAL MEDICINE

## 2024-10-15 PROCEDURE — 78452 HT MUSCLE IMAGE SPECT MULT: CPT

## 2024-10-15 PROCEDURE — 343N000001 HC RX 343 MED OP 636: Performed by: FAMILY MEDICINE

## 2024-10-15 RX ORDER — AMINOPHYLLINE 25 MG/ML
50-100 INJECTION, SOLUTION INTRAVENOUS
Status: COMPLETED | OUTPATIENT
Start: 2024-10-15 | End: 2024-10-15

## 2024-10-15 RX ORDER — REGADENOSON 0.08 MG/ML
0.4 INJECTION, SOLUTION INTRAVENOUS ONCE
Status: COMPLETED | OUTPATIENT
Start: 2024-10-15 | End: 2024-10-15

## 2024-10-15 RX ADMIN — Medication 8.4 MILLICURIE: at 11:23

## 2024-10-15 RX ADMIN — REGADENOSON 0.4 MG: 0.08 INJECTION, SOLUTION INTRAVENOUS at 12:57

## 2024-10-15 RX ADMIN — Medication 30.1 MILLICURIE: at 13:00

## 2024-10-15 NOTE — PROGRESS NOTES
Pt having increase shortness of breath with activity and increase heart burn.  The breathing is not associated with the heart burn.  Risk factors for CAD here for further cardiac evaluation.  Raya Vick RN

## 2024-10-16 NOTE — PROGRESS NOTES
NEW PATIENT RHEUMATOLOGY VISIT     Assessment & Plan     Problem List    HTN  Atrophic gastritic  Osteoarthritis       Systemic lupus erythematosus  Sjogren's Disease   Comment: THEE: 1:2560, speckled pattern, +SSA/+SSB, +RNP, hypocomplementemia, cutaneous lupus, SICCA symptoms.     History of unprovoked DVT 3/2024, APS antibodies at the time were negative.    Reportedly confirmed as SCLE on biopsy at outside hospital, but I do not have these records.    Appears well today with no signs of active lupus flare.  However, does have ecchymosis/purpura over her arms and chest which she reports have been stable and ongoing.  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    Plan:   -continue plaquenil 400mg daily   -continue pilocarpine 5mg BID   -continue carboxymethycell drops for eyes   -will obtain dermatology records given non-blanching purpuric plaques over bilateral arms and chest today   -disease activity labs today  -Repeat APS labs  -PFTs given RNP antibody and reports of dyspnea     Long Term Plaquenil   Comment: Reports she started Plaquenil only a few months ago  Plan:   -has eye exam scheduled in December  -We reviewed the risks of Plaquenil today including Plaquenil eye toxicity and the need for yearly eye exams    Autoimmune Hepatitis with Cirrhosis   Comment: Is followed by [facility] and is currently on imuran and prednisone 20mg daily   Plan:   -Continue to follow with [facility]        Orders Placed This Encounter   Procedures    Erythrocyte sedimentation rate auto    CRP inflammation    DNA double stranded antibodies    Complement C3    Complement C4    Beta 2 Glycoprotein Antibodies IGG IGM    Cardiolipin Sarah IgG and IgM    Lupus Anticoagulant Panel    Protein  random urine    UA with Microscopic reflex to Culture    General PFT Lab (Please always  keep checked)    Pulmonary Function Test        60 minutes spent on the day of the encounter doing chart review, history and exam, counseling and documentation.     The longitudinal plan of care for the diagnosis(es)/condition(s) as documented were addressed during this visit. Due to the added complexity in care, I will continue to support Adelina in the subsequent management and with ongoing continuity of care.    RTC in 3 months     Subjective         HPI    Patient presents to establish care for Sjogren's/Lupus overlap.  Her diagnosis is based on positive THEE: 1:2560, speckled pattern, +SSA/+SSB, +RNP, hypocomplementemia, and cutaneous lupus. Was previously seen by Saranya Law last seen 3/2024.  She was also seen by rheumatology at [facility] last seen also 3/2024.  Patient is a poor historian but per review her last note from [place], it appears that when she was seen in March she had been having worsening rash for the last year and was not on any medications for her lupus.  Reportedly she had tried Plaquenil 5 years prior but discontinued it after a week due to shortness of breath.  It appears that her recurrent rash has been managed with intermittent steroids per dermatology with improvement, and that biopsy confirmed SCLE.  However I do not have these records at this time.  She had also been noted to have elevated liver enzymes and underwent liver biopsy in June of 2021 that demonstrated mild steatosis and hepatocyte ballooning without features of autoimmune hepatitis or other chronic liver disease. Stage IV fibrosis was noted. Anti smooth muscle antibody and antimitochondrial antibody have been negative.Hepatitis testing has also been negative.  She currently follows with [facility] and is on Imuran and prednisone 20 mg daily for presumed autoimmune hepatitis given improvement of her transaminitis with steroids.    Per my discussion with Ms. García today, she reports she was diagnosed with lupus initially over 10  years ago after developing skin rash after prolonged sun exposure during a trip to [place].     She denies any history of oral ulcers, does get arthralgias over her hands, and she continues to have photosensitivity with flares of rash after sun exposure.  She shows me a picture on her phone from 2023 which reveals erythematous plaques with scale over her arms and chest consistent with SCLE.   No raynaud's, no oral or nasal ulcers, no hx of inflammatory eye disease, no +GERD on omeprazole, +dyspnea - had a recent stress test due to this which was normal, occasional cough, no chest pain, no fevers or weigh loss, no pleurisy   Has a history of a blood clot in her leg 2024 s/p 3 months of blood thinner, no history of miscarriages, 3 kids   +dry eyes +dry mouth, no numbness or tingling  No salivary gland swelling    No family  Hx of known autoimmune disease  Not a previous smoker. Not a current smoker.      Lab and Imaging review:    I reviewed recent labs and imaging includin/2024 [facility] labs through care everywhere positive THEE 1: 2560, SSA over 8, SSB over 8, RNP over 8, negative SCL 70, Sharee 1, dsDNA, Lim,    3/2024 negative smooth muscle antibody, negative mitochondrial antibody, normal beta-2 glycoprotein, cardiolipin antibodies, and lupus anticoagulant      Current Outpatient Medications:     amLODIPine (NORVASC) 5 MG tablet, TAKE 1 TABLET BY MOUTH EVERY DAY, Disp: 90 tablet, Rfl: 3    Ascorbic Acid (VITAMIN C) 500 MG CHEW, Take 1,000 mg by mouth., Disp: , Rfl:     azaTHIOprine (IMURAN) 50 MG tablet, Take 1 tablet by mouth daily at 2 pm., Disp: , Rfl:     baclofen (LIORESAL) 10 MG tablet, TAKE 0.5-1 TABLETS (5-10 MG) BY MOUTH 2 TIMES DAILY AS NEEDED FOR MUSCLE SPASMS, Disp: 180 tablet, Rfl: 1    CARBOXYMETHYLCELL/HYPROMELLOSE (GENTEAL GEL OPHT), Apply 2 drops to eye as needed., Disp: , Rfl:     citalopram (CELEXA) 20 MG tablet, TAKE 1 & 1/2 TABLETS BY MOUTH ONCE A DAY, Disp: 135 tablet,  Rfl: 1    diphenhydrAMINE-acetaminophen (TYLENOL PM)  MG tablet, Take 1 tablet by mouth nightly as needed for sleep., Disp: , Rfl:     fluocinonide (LIDEX) 0.05 % external cream, Apply topically 2 times daily., Disp: , Rfl:     furosemide (LASIX) 20 MG tablet, Take 2 tablets (40 mg) by mouth every morning, Disp: 180 tablet, Rfl: 1    hydroxychloroquine (PLAQUENIL) 200 MG tablet, Take 200 mg by mouth daily at 2 pm, Disp: , Rfl:     loratadine (CLARITIN) 10 mg tablet, Take 10 mg by mouth daily., Disp: , Rfl:     losartan (COZAAR) 100 MG tablet, TAKE 1 TABLET BY MOUTH EVERY DAY, Disp: 90 tablet, Rfl: 3    OMEGA-3/DHA/EPA/FISH OIL (FISH OIL-OMEGA-3 FATTY ACIDS) 300-1,000 mg capsule, Take 2 g by mouth daily., Disp: , Rfl:     omeprazole (PRILOSEC) 20 MG capsule, [OMEPRAZOLE (PRILOSEC) 20 MG CAPSULE] Take 2 capsules (40 mg total) by mouth 2 (two) times a day before meals., Disp: 120 capsule, Rfl: 1    pilocarpine (SALAGEN) 5 MG tablet, Take 1 tablet (5 mg) by mouth 2 times daily, Disp: 180 tablet, Rfl: 0    polyvinyl alcohol (LIQUIFILM TEARS) 1.4 % ophthalmic solution, Place 1 drop into both eyes as needed., Disp: , Rfl:     predniSONE (DELTASONE) 5 MG tablet, Take 30 mg by mouth daily, Disp: , Rfl:     RESTASIS MULTIDOSE 0.05 % ophthalmic emulsion, Place 1 drop into both eyes 2 times daily., Disp: , Rfl:     spironolactone (ALDACTONE) 25 MG tablet, Take 50 mg by mouth daily., Disp: , Rfl:     multivitamin therapeutic tablet, [MULTIVITAMIN THERAPEUTIC TABLET] Take 1 tablet by mouth daily. (Patient not taking: Reported on 5/1/2024), Disp: , Rfl:     valACYclovir (VALTREX) 1000 mg tablet, Take 1 tablet by mouth 3 times daily (Patient not taking: Reported on 5/1/2024), Disp: , Rfl:     Current Facility-Administered Medications:     ceFAZolin (ANCEF) 2 g in sodium chloride 0.9 % 100 mL intermittent infusion, 2 g, Intravenous, Q8H, Leonel Arrington DO, Last Rate: 200 mL/hr at 12/04/23 1052, 2 g at 12/04/23  1052  Allergies:  Allergies   Allergen Reactions    Hydroxychloroquine Shortness Of Breath    Celecoxib GI Disturbance    Codeine Unknown     Emesis, tired    Egg White [Egg White (Egg Protein)] Unknown     Dumping syndrome     Medical Hx:  Past Medical History:   Diagnosis Date    GERD (gastroesophageal reflux disease)     Hypertension     Neck pain     Osteoarthritis 1/13/2016    Systemic lupus (H)      Surgical Hx:  Past Surgical History:   Procedure Laterality Date    ARTHROPLASTY KNEE BILATERAL      BIOPSY BREAST Right 2005    benign    CV CORONARY ANGIOGRAM N/A 8/22/2019    Procedure: Coronary Angiogram;  Surgeon: Melissa Vasquez MD;  Location: [facility] Cath Lab;  Service: Cardiology    CV LEFT HEART CATHETERIZATION WITHOUT LEFT VENTRICULOGRAM Left 8/22/2019    Procedure: Left Heart Catheterization Without Left Ventriculogram;  Surgeon: Melissa Vasquez MD;  Location: [facility] Cath Lab;  Service: Cardiology    FISSURECTOMY RECTUM      HC REMOVAL GALLBLADDER      Description: Cholecystectomy;  Recorded: 04/16/2012;    HYSTERECTOMY  1986    UT TOT DISC ARTHRP ART DISC ANT APPRO 1 NTRSPC CRV N/A 6/17/2015    Procedure:  C45 MOBI-C DISC ARTHORPLASTY, ATTEMPT MOBI C C56, ANTERIOR CERVICAL DISCECTOMY AND FUSION IF UNABLE TO PLACE ;  Surgeon: Korina Beltrán MD;  Location: [facility] OR;  Service: Spine    ZZC GASTROPLASTY,OBESITY,OTHER      Description: Gastric Surgery For Morbid Obesity Gastric Stapling;  Recorded: 04/16/2012;     Family Hx:  Family History   Problem Relation Age of Onset    Breast Cancer Sister 52.00    Cancer Sister     Breast Cancer Sister 60.00     Social Hx:  Social History     Tobacco Use    Smoking status: Never     Passive exposure: Past    Smokeless tobacco: Never   Vaping Use    Vaping status: Never Used   Substance Use Topics    Alcohol use: Yes     Comment: Alcoholic Drinks/day: glass of wine on occasion    Drug use: No        Objective   Physical Exam   /77 (BP  Location: Left arm)   Pulse 92   Wt 81.4 kg (179 lb 8 oz)   SpO2 98%   BMI 28.11 kg/m    General: alert, well appearing, no distress  HEENT:  clear conjunctiva, no oral or nasal ulcers, no cervical lymphadenopathy  Cardiac: normal rate and rhythm, no murmur, rubs or gallops   Pulm: normal respiratory effort, clear to auscultation bilaterally   MSK: no synovitis   Skin: non-blanching purpuric patches over bilateral arms and chest, no raised lesions, no excoriations or scaling, no other rashes.          Denise Mchugh MD  Rheumatology

## 2024-10-17 ENCOUNTER — OFFICE VISIT (OUTPATIENT)
Dept: RHEUMATOLOGY | Facility: CLINIC | Age: 79
End: 2024-10-17
Payer: COMMERCIAL

## 2024-10-17 ENCOUNTER — LAB (OUTPATIENT)
Dept: LAB | Facility: CLINIC | Age: 79
End: 2024-10-17
Payer: COMMERCIAL

## 2024-10-17 VITALS
DIASTOLIC BLOOD PRESSURE: 77 MMHG | BODY MASS INDEX: 28.11 KG/M2 | HEART RATE: 92 BPM | SYSTOLIC BLOOD PRESSURE: 128 MMHG | OXYGEN SATURATION: 98 % | WEIGHT: 179.5 LBS

## 2024-10-17 DIAGNOSIS — M35.00 SLE-SJOGREN OVERLAP SYNDROME (H): ICD-10-CM

## 2024-10-17 DIAGNOSIS — M32.9 SLE-SJOGREN OVERLAP SYNDROME (H): ICD-10-CM

## 2024-10-17 DIAGNOSIS — M32.9 SLE-SJOGREN OVERLAP SYNDROME (H): Primary | ICD-10-CM

## 2024-10-17 DIAGNOSIS — M35.00 SLE-SJOGREN OVERLAP SYNDROME (H): Primary | ICD-10-CM

## 2024-10-17 LAB
ALBUMIN MFR UR ELPH: <6 MG/DL
ALBUMIN UR-MCNC: NEGATIVE MG/DL
APPEARANCE UR: CLEAR
BACTERIA #/AREA URNS HPF: ABNORMAL /HPF
BILIRUB UR QL STRIP: NEGATIVE
COLOR UR AUTO: YELLOW
CREAT UR-MCNC: 44.9 MG/DL
CRP SERPL-MCNC: <3 MG/L
ERYTHROCYTE [SEDIMENTATION RATE] IN BLOOD BY WESTERGREN METHOD: 22 MM/HR (ref 0–30)
GLUCOSE UR STRIP-MCNC: NEGATIVE MG/DL
HGB UR QL STRIP: ABNORMAL
KETONES UR STRIP-MCNC: NEGATIVE MG/DL
LEUKOCYTE ESTERASE UR QL STRIP: ABNORMAL
NITRATE UR QL: NEGATIVE
PH UR STRIP: 6 [PH] (ref 5–8)
PROT/CREAT 24H UR: NORMAL MG/G{CREAT}
RBC #/AREA URNS AUTO: ABNORMAL /HPF
SP GR UR STRIP: 1.01 (ref 1–1.03)
SQUAMOUS #/AREA URNS AUTO: ABNORMAL /LPF
UROBILINOGEN UR STRIP-ACNC: 0.2 E.U./DL
WBC #/AREA URNS AUTO: ABNORMAL /HPF

## 2024-10-17 PROCEDURE — 85613 RUSSELL VIPER VENOM DILUTED: CPT

## 2024-10-17 PROCEDURE — 85652 RBC SED RATE AUTOMATED: CPT

## 2024-10-17 PROCEDURE — 81001 URINALYSIS AUTO W/SCOPE: CPT

## 2024-10-17 PROCEDURE — 85730 THROMBOPLASTIN TIME PARTIAL: CPT

## 2024-10-17 PROCEDURE — G2211 COMPLEX E/M VISIT ADD ON: HCPCS | Performed by: STUDENT IN AN ORGANIZED HEALTH CARE EDUCATION/TRAINING PROGRAM

## 2024-10-17 PROCEDURE — 86147 CARDIOLIPIN ANTIBODY EA IG: CPT

## 2024-10-17 PROCEDURE — 86146 BETA-2 GLYCOPROTEIN ANTIBODY: CPT

## 2024-10-17 PROCEDURE — 99417 PROLNG OP E/M EACH 15 MIN: CPT | Performed by: STUDENT IN AN ORGANIZED HEALTH CARE EDUCATION/TRAINING PROGRAM

## 2024-10-17 PROCEDURE — 85390 FIBRINOLYSINS SCREEN I&R: CPT | Performed by: PATHOLOGY

## 2024-10-17 PROCEDURE — 87086 URINE CULTURE/COLONY COUNT: CPT

## 2024-10-17 PROCEDURE — 36415 COLL VENOUS BLD VENIPUNCTURE: CPT

## 2024-10-17 PROCEDURE — 84156 ASSAY OF PROTEIN URINE: CPT

## 2024-10-17 PROCEDURE — 86225 DNA ANTIBODY NATIVE: CPT

## 2024-10-17 PROCEDURE — 86140 C-REACTIVE PROTEIN: CPT

## 2024-10-17 PROCEDURE — 86160 COMPLEMENT ANTIGEN: CPT

## 2024-10-17 PROCEDURE — 99215 OFFICE O/P EST HI 40 MIN: CPT | Performed by: STUDENT IN AN ORGANIZED HEALTH CARE EDUCATION/TRAINING PROGRAM

## 2024-10-18 LAB
B2 GLYCOPROT1 IGG SERPL IA-ACNC: <0.8 U/ML
B2 GLYCOPROT1 IGM SERPL IA-ACNC: <2.4 U/ML
BACTERIA UR CULT: NORMAL
C3 SERPL-MCNC: 62 MG/DL (ref 81–157)
C4 SERPL-MCNC: 17 MG/DL (ref 13–39)
CARDIOLIPIN IGG SER IA-ACNC: <2 GPL-U/ML
CARDIOLIPIN IGG SER IA-ACNC: NEGATIVE
CARDIOLIPIN IGM SER IA-ACNC: 2.2 MPL-U/ML
CARDIOLIPIN IGM SER IA-ACNC: NEGATIVE
DSDNA AB SER-ACNC: 2.5 IU/ML

## 2024-10-21 ENCOUNTER — VIRTUAL VISIT (OUTPATIENT)
Dept: FAMILY MEDICINE | Facility: CLINIC | Age: 79
End: 2024-10-21
Payer: COMMERCIAL

## 2024-10-21 DIAGNOSIS — R06.09 DOE (DYSPNEA ON EXERTION): Primary | ICD-10-CM

## 2024-10-21 DIAGNOSIS — M79.661 PAIN OF RIGHT LOWER LEG: ICD-10-CM

## 2024-10-21 DIAGNOSIS — G57.93 NEUROPATHY OF BOTH FEET: ICD-10-CM

## 2024-10-21 LAB
DRVVT SCREEN RATIO: 0.81
INR PPP: 1.05 (ref 0.85–1.15)
LA PPP-IMP: NEGATIVE
LOCATION OF TASK: NORMAL
LUPUS INTERPRETATION: NORMAL
PTT RATIO: 0.94
THROMBIN TIME: 18.6 SECONDS (ref 13–19)

## 2024-10-21 PROCEDURE — 99207 PR NO CHARGE LOS: CPT | Mod: 93 | Performed by: FAMILY MEDICINE

## 2024-10-21 NOTE — PROGRESS NOTES
Adelina is a 79 year old who is being evaluated via a billable telephone visit.    What phone number would you like to be contacted at? [phone]   How would you like to obtain your AVS? [place]  Originating Location (pt. Location): Home    Distant Location (provider location):  On-site    Adelina was seen today for results.    Diagnoses and all orders for this visit:    GUZMAN (dyspnea on exertion)    Pain of right lower leg  -     Physical Therapy  Referral; Future    Neuropathy of both feet  -     Physical Therapy  Referral; Future           Subjective   Adelina is a 79 year old, presenting for the following health issues:  Results    HPI     She has a complicated medical history that includes Sjogren's syndrome and lupus.  She has liver cirrhosis.  She had a DVT this past year.  There were concerns of gastrointestinal bleeding while anticoagulated.  No longer anticoagulated.  No concerns for bleeding with normal hemoglobin done recently.  Continues to work with rheumatology and dermatology regarding her lupus and Sjogren's disease.    At her recent annual wellness visit patient had complained of continued dyspnea on exertion.  Chest x-ray and EKG were normal during that visit.  Anemia was confirmed to have resolved.  She was sent for cardiac stress test.  Today we reviewed the result noting there is normal ejection fraction without any signs of ischemia.  Her rheumatologist has recommended she proceed with pulmonary function testing which I think would be the logical next step to workup her breathing difficulties that are described.  Patient reports no overall significant change in her shortness of breath.  She reports no new or worsened symptoms.    We did discuss arranging for physical therapy given her conditions that affect her legs.      Complete review of systems is obtained.  Other than the specific considerations noted above complete review of systems is negative.        Objective            Vitals:  No vitals were obtained today due to virtual visit.    Physical Exam   General: Alert and no distress //Respiratory: No audible wheeze, cough, or shortness of breath // Psychiatric:  Appropriate affect, tone, and pace of words            Phone call duration: 4 minutes  Signed Electronically by: Rich Kelly MD, MD

## 2024-10-29 ENCOUNTER — THERAPY VISIT (OUTPATIENT)
Dept: PHYSICAL THERAPY | Facility: REHABILITATION | Age: 79
End: 2024-10-29
Attending: FAMILY MEDICINE
Payer: COMMERCIAL

## 2024-10-29 DIAGNOSIS — M79.661 PAIN OF RIGHT LOWER LEG: Primary | ICD-10-CM

## 2024-10-29 DIAGNOSIS — G57.93 NEUROPATHY OF BOTH FEET: ICD-10-CM

## 2024-10-29 PROCEDURE — 97110 THERAPEUTIC EXERCISES: CPT | Mod: GP | Performed by: PHYSICAL THERAPIST

## 2024-10-29 PROCEDURE — 97161 PT EVAL LOW COMPLEX 20 MIN: CPT | Mod: GP | Performed by: PHYSICAL THERAPIST

## 2024-10-29 PROCEDURE — 97535 SELF CARE MNGMENT TRAINING: CPT | Mod: GP | Performed by: PHYSICAL THERAPIST

## 2024-10-29 NOTE — PROGRESS NOTES
PHYSICAL THERAPY EVALUATION  Type of Visit: Evaluation        Fall Risk Screen:  Fall screen completed by: PT  Have you fallen 2 or more times in the past year?: (Patient-Rptd) No  Have you fallen and had an injury in the past year?: (Patient-Rptd) No  Is patient a fall risk?: No    Subjective         Presenting condition or subjective complaint: (Patient-Rptd) nubness in leg  Pain started a while ago, but seems like it is getting worse. Does report doing some stretching before she gets out of bed.   When she is standing mostly in the kitchen, the longer she stands the right leg gets numb. Toes get numb as well, but the numbness in the right thigh is newer. This is not something that she gets all the time.   Pain Described as anterior thigh on the right is numbness. The leg feels weak when she gets this sensation. No in bowel/bladder control  Worse with standing in the kitchen variable times. Able to walk the dog without pain.  Better with sitting down.  Would also like to know how to get up if she were to fall, especially outside.     Date of onset: 10/21/24    Relevant medical history:     Dates & types of surgery:      Prior diagnostic imaging/testing results:       Prior therapy history for the same diagnosis, illness or injury: (Patient-Rptd) No      Prior Level of Function  Transfers:   Ambulation:   ADL:   IADL:     Living Environment  Social support: (Patient-Rptd) Alone   Type of home: (Patient-Rptd) [facility]   Stairs to enter the home:         Ramp: (Patient-Rptd) No   Stairs inside the home: (Patient-Rptd) No       Help at home: (Patient-Rptd) None  Equipment owned:       Employment: (Patient-Rptd) No    Hobbies/Interests:      Patient goals for therapy: (Patient-Rptd) standing with no numbness    Pain assessment: Pain present     Objective   Precautions/Restrictions: h/o B TKA, cervical surgeries, Lupus, Sjogren's   Involved side: right  Posture Observation:      WNL    Lumbar ROM:    Date: 10/29/2024      *Indicate scale AROM AROM AROM   Lumbar Flexion 5 cm      Lumbar Extension Mod loss      Right Left Right Left Right Left   Lumbar Sidebending WNL WNL       Lumbar Rotation         Thoracic Rotation           Lower Extremity Strength:     Date: 10/29/2024     LE strength/5 Right Left Right Left Right Left   Hip Flexion (L1-3) 5 5       Hip Extension (L5-S1)         Hip Abduction (L4-5) 5        Hip Adduction (L2-3)         Hip External Rotation         Hip Internal Rotation         Knee Extension (L3-4) 5 5       Knee Flexion         Ankle Dorsiflexion (L4-5) 5 5       Great Toe Extension (L5) 5 5       Ankle Plantar flexion (S1)         Abdominals        Sensation          Reflex Testing  Lumbar Dermatomes Right Left UE Reflexes Right Left   Iliac Crest and Groin (L1)   Biceps (C5-6)     Anterior Medial Thigh (L2) intact intact Brachioradialis (C5-6)     Anterior Thigh, Medial Epicondyle Femur (L3) intact intact Triceps (C7-8)     Lateral Thigh, Anterior Knee, Medial Leg/Malleolus (L4)   Jojo's test     Lateral Leg, Dorsal Foot (L5)   LE Reflexes     Lateral Foot (S1)   Patellar (L3-4)     Posterior Leg (S2)   Achilles (S1-2)     Other:   Babinski Response       Palpation: inc tension to lumbar paraspinals, not painful    Lumbar Special Tests:     Lumbar Special Tests Right Left SI Tests Right  Left   Quadrant test   SI Compression     Straight leg raise 90 90 SI Distraction     Crossover response   POSH/Thigh Thrust Test     Slump - - Sacral Thrust     Sit-up test  Gaenslen's     Trunk extensor endurance test  FADIR - -   Prone instability test  JOSEPH - -   Pubic shotgun  Shala's       Repeated Motion Testing:  NT    Passive Mobility - Joint Integrity:  Mild hypomobility throughout lumbar spine    LE Screen:  Significant tightness of bilateral hip flexor and quad muscles      Assessment & Plan   CLINICAL IMPRESSIONS  Medical Diagnosis: Pain of right lower leg  Neuropathy of both feet    Treatment Diagnosis:  right anterior thigh numbness, hip flexor and quad tightness on right. lumbar radicular pain   Impression/Assessment: Patient is a 79 year old female with right anterior thigh numbness complaints.  The following significant findings have been identified: Pain, Decreased ROM/flexibility, Decreased joint mobility, Decreased strength, Impaired muscle performance, Decreased activity tolerance, and Impaired posture. These impairments interfere with their ability to perform  prolonged standing  as compared to previous level of function. Pain may be lumbar radicular or related to muscular tightness in hip and thigh.    Clinical Decision Making (Complexity):  Clinical Presentation: Stable/Uncomplicated  Clinical Presentation Rationale: based on medical and personal factors listed in PT evaluation  Clinical Decision Making (Complexity): Low complexity    PLAN OF CARE  Treatment Interventions:  Interventions: Manual Therapy, Neuromuscular Re-education, Therapeutic Activity, Therapeutic Exercise, Self-Care/Home Management    Long Term Goals     PT Goal 1  Goal Description: Patient will be able to  her kitchen > 10 minutes to cook a meal without increased symptoms in 8 weeks:  Target Date: 12/24/24  PT Goal 2  Goal Description: Patient will be able to decrease LE symptoms with HEP in 8 weeks:  Target Date: 12/24/24  PT Goal 3  Goal Description: Patient will report understanding of HEP and education for self management of symptoms in 8 weeks;  Target Date: 12/24/24      Frequency of Treatment: 1X/week  Duration of Treatment: up to 6 visits over 12 weeks    Recommended Referrals to Other Professionals:   Education Assessment:   Learner/Method: Patient  Education Comments: PT POC, HEP    Risks and benefits of evaluation/treatment have been explained.   Patient/Family/caregiver agrees with Plan of Care.     Evaluation Time:     PT Eval, Low Complexity Minutes (45891): 20       Signing Clinician: Lobo Denson,  PT        [facility]                                                                                   OUTPATIENT PHYSICAL THERAPY      PLAN OF TREATMENT FOR OUTPATIENT REHABILITATION   Patient's Last Name, First Name, Carolina Maddox YOB: 1945   Provider's Name   [facility]   Medical Record No.  [id number]     Onset Date: 10/21/24  Start of Care Date: 10/29/24     Medical Diagnosis:  Pain of right lower leg  Neuropathy of both feet      PT Treatment Diagnosis:  right anterior thigh numbness, hip flexor and quad tightness on right. lumbar radicular pain Plan of Treatment  Frequency/Duration: 1X/week/ up to 6 visits over 12 weeks    Certification date from 10/29/24 to 01/21/25         See note for plan of treatment details and functional goals     Lobo Denson, PT                         I CERTIFY THE NEED FOR THESE SERVICES FURNISHED UNDER        THIS PLAN OF TREATMENT AND WHILE UNDER MY CARE     (Physician attestation of this document indicates review and certification of the therapy plan).              Referring Provider:  Rich Kelly    Initial Assessment  See Epic Evaluation- Start of Care Date: 10/29/24

## 2024-10-31 DIAGNOSIS — K74.60 CIRRHOSIS OF LIVER WITHOUT ASCITES, UNSPECIFIED HEPATIC CIRRHOSIS TYPE (H): ICD-10-CM

## 2024-10-31 DIAGNOSIS — R60.9 EDEMA, UNSPECIFIED TYPE: ICD-10-CM

## 2024-10-31 RX ORDER — FUROSEMIDE 20 MG/1
40 TABLET ORAL EVERY MORNING
Qty: 180 TABLET | Refills: 1 | Status: SHIPPED | OUTPATIENT
Start: 2024-10-31

## 2024-11-15 ENCOUNTER — TELEPHONE (OUTPATIENT)
Dept: RHEUMATOLOGY | Facility: CLINIC | Age: 79
End: 2024-11-15
Payer: COMMERCIAL

## 2024-11-15 DIAGNOSIS — M35.01 SJOGREN'S SYNDROME WITH KERATOCONJUNCTIVITIS SICCA (H): ICD-10-CM

## 2024-11-15 DIAGNOSIS — M32.9 SLE-SJOGREN OVERLAP SYNDROME (H): Primary | ICD-10-CM

## 2024-11-15 DIAGNOSIS — Z79.899 HIGH RISK MEDICATION USE: ICD-10-CM

## 2024-11-15 DIAGNOSIS — L93.2 CUTANEOUS LUPUS ERYTHEMATOSUS: ICD-10-CM

## 2024-11-15 DIAGNOSIS — M35.00 SLE-SJOGREN OVERLAP SYNDROME (H): Primary | ICD-10-CM

## 2024-11-15 NOTE — TELEPHONE ENCOUNTER
[facility] Call Center    Phone Message    May a detailed message be left on voicemail: yes     Reason for Call: Medication Refill Request    Has the patient contacted the pharmacy for the refill? Yes     Name of medication being requested: hydroxychloroquine (PLAQUENIL) 200 MG tablet     Provider who prescribed the medication: Pt was told by [facility] that the prescription order should come from Rheumatologist    Pharmacy:   [id number] [address]     Date medication is needed: ASAP    Action Taken: Message routed to:  Other: RHEUM    Travel Screening: Not Applicable     Date of Service: 11/15/24

## 2024-11-18 DIAGNOSIS — M35.01 SJOGREN'S SYNDROME WITH KERATOCONJUNCTIVITIS SICCA (H): ICD-10-CM

## 2024-11-18 RX ORDER — PILOCARPINE HYDROCHLORIDE 5 MG/1
5 TABLET, FILM COATED ORAL 2 TIMES DAILY
Qty: 180 TABLET | Refills: 3 | Status: SHIPPED | OUTPATIENT
Start: 2024-11-18

## 2024-11-18 NOTE — TELEPHONE ENCOUNTER
pilocarpine (SALAGEN) 5 MG tablet   180 tablet 0 8/12/2024     Last Office Visit : 10-  Future Office visit:  2-

## 2024-11-20 ENCOUNTER — TRANSFERRED RECORDS (OUTPATIENT)
Dept: HEALTH INFORMATION MANAGEMENT | Facility: CLINIC | Age: 79
End: 2024-11-20

## 2024-11-20 ENCOUNTER — THERAPY VISIT (OUTPATIENT)
Dept: PHYSICAL THERAPY | Facility: REHABILITATION | Age: 79
End: 2024-11-20
Attending: FAMILY MEDICINE
Payer: COMMERCIAL

## 2024-11-20 DIAGNOSIS — M79.661 PAIN OF RIGHT LOWER LEG: Primary | ICD-10-CM

## 2024-11-20 NOTE — TELEPHONE ENCOUNTER
[facility] Call Center    Phone Message    May a detailed message be left on voicemail: yes     Reason for Call: Other: Patient was calling to check on the medication request and asked for a refill and call back. Please call back and advise.     Action Taken: Message routed to:  Other: rheum    Travel Screening: Not Applicable     Date of Service: 11/20/24

## 2024-11-20 NOTE — TELEPHONE ENCOUNTER
10/17:  Plan:   -continue plaquenil 400mg daily   -continue pilocarpine 5mg BID   -continue carboxymethycell drops for eyes   -will obtain dermatology records given non-blanching purpuric plaques over bilateral arms and chest today   -disease activity labs today  -Repeat APS labs  -PFTs given RNP antibody and reports of dyspnea     Long Term Plaquenil   Comment: Reports she started Plaquenil only a few months ago  Plan:   -has eye exam scheduled in December  -We reviewed the risks of Plaquenil today including Plaquenil eye toxicity and the need for yearly eye exams    Component      Latest Ref Rng 10/4/2024  12:37 PM   AST      0 - 45 U/L 75 (H)    ALT      0 - 50 U/L 107 (H)       Component      Latest Ref Rng 10/4/2024  12:37 PM   Creatinine      0.51 - 0.95 mg/dL 0.97 (H)    GFR Estimate      >60 mL/min/1.73m2 60 (L)    Calcium      8.8 - 10.4 mg/dL 8.7 (L)       Legend:  (H) High  (L) Low

## 2024-11-21 RX ORDER — HYDROXYCHLOROQUINE SULFATE 200 MG/1
200 TABLET, FILM COATED ORAL
Qty: 180 TABLET | Refills: 0 | Status: SHIPPED | OUTPATIENT
Start: 2024-11-21

## 2024-11-25 ENCOUNTER — LAB (OUTPATIENT)
Dept: LAB | Facility: CLINIC | Age: 79
End: 2024-11-25
Payer: COMMERCIAL

## 2024-11-25 DIAGNOSIS — K75.81 LIVER CIRRHOSIS SECONDARY TO NONALCOHOLIC STEATOHEPATITIS (NASH) (H): ICD-10-CM

## 2024-11-25 DIAGNOSIS — Z79.899 HIGH RISK MEDICATION USE: ICD-10-CM

## 2024-11-25 DIAGNOSIS — K74.60 LIVER CIRRHOSIS SECONDARY TO NONALCOHOLIC STEATOHEPATITIS (NASH) (H): ICD-10-CM

## 2024-11-25 DIAGNOSIS — K74.60 CIRRHOSIS (H): ICD-10-CM

## 2024-11-25 DIAGNOSIS — M35.00 SLE-SJOGREN OVERLAP SYNDROME (H): ICD-10-CM

## 2024-11-25 DIAGNOSIS — M35.01 SJOGREN'S SYNDROME WITH KERATOCONJUNCTIVITIS SICCA (H): ICD-10-CM

## 2024-11-25 DIAGNOSIS — M32.9 SLE-SJOGREN OVERLAP SYNDROME (H): ICD-10-CM

## 2024-11-25 DIAGNOSIS — L93.2 CUTANEOUS LUPUS ERYTHEMATOSUS: ICD-10-CM

## 2024-11-25 LAB
AFP SERPL-MCNC: 3.6 NG/ML
ALBUMIN SERPL BCG-MCNC: 3.9 G/DL (ref 3.5–5.2)
ALP SERPL-CCNC: 71 U/L (ref 40–150)
ALT SERPL W P-5'-P-CCNC: 91 U/L (ref 0–50)
AST SERPL W P-5'-P-CCNC: 73 U/L (ref 0–45)
BILIRUB DIRECT SERPL-MCNC: 0.35 MG/DL (ref 0–0.3)
BILIRUB SERPL-MCNC: 1.2 MG/DL
CREAT SERPL-MCNC: 0.93 MG/DL (ref 0.51–0.95)
EGFRCR SERPLBLD CKD-EPI 2021: 63 ML/MIN/1.73M2
ERYTHROCYTE [DISTWIDTH] IN BLOOD BY AUTOMATED COUNT: 13.1 % (ref 10–15)
HCT VFR BLD AUTO: 40.3 % (ref 35–47)
HGB BLD-MCNC: 13.4 G/DL (ref 11.7–15.7)
MCH RBC QN AUTO: 32.4 PG (ref 26.5–33)
MCHC RBC AUTO-ENTMCNC: 33.3 G/DL (ref 31.5–36.5)
MCV RBC AUTO: 97 FL (ref 78–100)
PLATELET # BLD AUTO: 137 10E3/UL (ref 150–450)
PROT SERPL-MCNC: 7.1 G/DL (ref 6.4–8.3)
RBC # BLD AUTO: 4.14 10E6/UL (ref 3.8–5.2)
WBC # BLD AUTO: 9.4 10E3/UL (ref 4–11)

## 2024-11-25 PROCEDURE — 85027 COMPLETE CBC AUTOMATED: CPT

## 2024-11-25 PROCEDURE — 36415 COLL VENOUS BLD VENIPUNCTURE: CPT

## 2024-11-25 PROCEDURE — 80076 HEPATIC FUNCTION PANEL: CPT

## 2024-11-25 PROCEDURE — 82105 ALPHA-FETOPROTEIN SERUM: CPT

## 2024-11-25 PROCEDURE — 82565 ASSAY OF CREATININE: CPT

## 2024-11-27 ENCOUNTER — TRANSFERRED RECORDS (OUTPATIENT)
Dept: HEALTH INFORMATION MANAGEMENT | Facility: CLINIC | Age: 79
End: 2024-11-27

## 2024-11-27 ENCOUNTER — THERAPY VISIT (OUTPATIENT)
Dept: PHYSICAL THERAPY | Facility: REHABILITATION | Age: 79
End: 2024-11-27
Attending: FAMILY MEDICINE
Payer: COMMERCIAL

## 2024-11-27 DIAGNOSIS — M79.661 PAIN OF RIGHT LOWER LEG: Primary | ICD-10-CM

## 2024-11-27 PROCEDURE — 97110 THERAPEUTIC EXERCISES: CPT | Mod: GP

## 2024-11-27 PROCEDURE — 97535 SELF CARE MNGMENT TRAINING: CPT | Mod: GP

## 2024-11-27 NOTE — PROGRESS NOTES
"    DISCHARGE  Reason for Discharge: No further expectation of progress.  Patient chooses to discontinue therapy.    Equipment Issued:     Discharge Plan: Patient to continue home program.    Referring Provider:  Rich Kelly       11/27/24 0500   Appointment Info   Signing clinician's name / credentials Jin Pardo, PT, DPT   Visits Used 3/6   Medical Diagnosis Pain of right lower leg  Neuropathy of both feet   PT Tx Diagnosis right anterior thigh numbness, hip flexor and quad tightness on right. lumbar radicular pain   Precautions/Limitations see [facility]   Progress Note/Certification   Start of Care Date 10/29/24   Onset of illness/injury or Date of Surgery 10/21/24   Therapy Frequency 1X/week   Predicted Duration up to 6 visits over 12 weeks   Certification date from 10/29/24   Certification date to 01/21/25   Progress Note Due Date 12/24/24   Progress Note Completed Date 10/29/24   GOALS   PT Goals 2;3   PT Goal 1   Goal Description Patient will be able to  her kitchen > 10 minutes to cook a meal without increased symptoms in 8 weeks:   Goal Progress met   Target Date 12/24/24   Date Met 11/27/24   PT Goal 2   Goal Description Patient will be able to decrease LE symptoms with HEP in 8 weeks:   Goal Progress ongoing   Target Date 12/24/24   PT Goal 3   Goal Description Patient will report understanding of HEP and education for self management of symptoms in 8 weeks;   Goal Progress met   Target Date 12/24/24   Date Met 11/27/24   Subjective Report   Subjective Report \"I've been doing fairly well overall. My leg feels fine, though the numbness and tingling come and go.\"   Treatment Interventions (PT)   Interventions Therapeutic Procedure/Exercise;Self Care/Home Management   Therapeutic Procedure/Exercise   Therapeutic Procedures: strength, endurance, ROM, flexibility minutes (55704) 13   PTRx Ther Proc 1 nustep x 6 min, reviewed HEP 1 x 4 reps each   Skilled Intervention initiation of HEP, cues on all " exercises provided, handout provided   Self Care/home Management   ADL/Home Mgmt Training (05664) 19   Self Care 1 Education was provided on the importance of making regular position changes, maintaining good posture, and incorporating consistent physical activity, including aerobic exercises, to support overall vascular health. We also discussed the significance of closely monitoring her symptoms to track any changes or worsening. During our conversation, we explored the potential causes of the numbness and tingling occurring in her right lower leg, focusing on factors that could be contributing to these sensations. Additionally, we emphasized the critical role of compliance with her home exercise program (HEP) in managing her condition and improving her symptoms. I encouraged her to stay proactive in her care and to reach out if she has any questions or concerns regarding her treatment plan or symptoms.   Skilled Intervention education above   Education   Learner/Method Patient   Education Comments PT POC, HEP   Plan   Home program PTRX   Plan for next session see dc summary   Comments   Comments The patient returned for her final scheduled visit, reporting that she feels better overall, but with some increased pain and numbness in her right lower leg since the start of care. We briefly discussed possible reasons for this increase in symptoms, including the likelihood that certain activities or prolonged positions may have exacerbated the nerve irritation or affected circulation in the area. It s also possible that the healing process, including gradual mobilization of the tissues and nerves, can sometimes lead to temporary flare-ups as the body adjusts. Additionally, changes in activity level or stress on the affected leg may contribute to intermittent increases in discomfort. Despite this, the patient was able to meet two of her three therapy goals during today s session, demonstrating steady and consistent  progress, albeit at a slower pace.    I encouraged her to continue maintaining an active lifestyle, with particular focus on exercises that promote circulation and nerve health, while being mindful of overexertion. We discussed the importance of listening to her body and avoiding positions or activities that might worsen her symptoms. I also reassured her that fluctuations in pain and numbness can be a normal part of the healing process, and she should feel comfortable reaching out if she has any questions or wants to continue care. She verbalized understanding and expressed a desire to independently manage her symptoms moving forward, feeling confident in her ability to apply the techniques and strategies we ve discussed.   Total Session Time   Timed Code Treatment Minutes 32   Total Treatment Time (sum of timed and untimed services) 32

## 2024-12-12 ENCOUNTER — OFFICE VISIT (OUTPATIENT)
Dept: PULMONOLOGY | Facility: CLINIC | Age: 79
End: 2024-12-12
Payer: COMMERCIAL

## 2024-12-12 DIAGNOSIS — M35.00 SLE-SJOGREN OVERLAP SYNDROME (H): ICD-10-CM

## 2024-12-12 DIAGNOSIS — M32.9 SLE-SJOGREN OVERLAP SYNDROME (H): ICD-10-CM

## 2024-12-12 LAB
DLCOCOR-%PRED-PRE: 68 %
DLCOCOR-PRE: 13.61 ML/MIN/MMHG
DLCOUNC-%PRED-PRE: 68 %
DLCOUNC-PRE: 13.65 ML/MIN/MMHG
DLCOUNC-PRED: 19.86 ML/MIN/MMHG
ERV-%PRED-PRE: 20 %
ERV-PRE: 0.15 L
ERV-PRED: 0.75 L
EXPTIME-PRE: 6.66 SEC
FEF2575-%PRED-PRE: 119 %
FEF2575-PRE: 1.95 L/SEC
FEF2575-PRED: 1.63 L/SEC
FEFMAX-%PRED-PRE: 104 %
FEFMAX-PRE: 5.7 L/SEC
FEFMAX-PRED: 5.46 L/SEC
FEV1-%PRED-PRE: 81 %
FEV1-PRE: 1.79 L
FEV1FEV6-PRE: 82 %
FEV1FEV6-PRED: 78 %
FEV1FVC-PRE: 82 %
FEV1FVC-PRED: 77 %
FEV1SVC-PRE: 78 %
FEV1SVC-PRED: 75 %
FIFMAX-PRE: 4.14 L/SEC
FRCPLETH-%PRED-PRE: 64 %
FRCPLETH-PRE: 2.05 L
FRCPLETH-PRED: 3.2 L
FVC-%PRED-PRE: 74 %
FVC-PRE: 2.19 L
FVC-PRED: 2.92 L
HGB BLD-MCNC: 13.5 G/DL
IC-%PRED-PRE: 99 %
IC-PRE: 2.15 L
IC-PRED: 2.16 L
RVPLETH-%PRED-PRE: 80 %
RVPLETH-PRE: 1.9 L
RVPLETH-PRED: 2.37 L
TLCPLETH-%PRED-PRE: 77 %
TLCPLETH-PRE: 4.2 L
TLCPLETH-PRED: 5.41 L
VA-%PRED-PRE: 70 %
VA-PRE: 3.46 L
VC-%PRED-PRE: 78 %
VC-PRE: 2.3 L
VC-PRED: 2.94 L

## 2024-12-15 LAB
DLCOCOR-%PRED-PRE: 68 %
DLCOCOR-PRE: 13.61 ML/MIN/MMHG
DLCOUNC-%PRED-PRE: 68 %
DLCOUNC-PRE: 13.65 ML/MIN/MMHG
DLCOUNC-PRED: 19.86 ML/MIN/MMHG
ERV-%PRED-PRE: 20 %
ERV-PRE: 0.15 L
ERV-PRED: 0.75 L
EXPTIME-PRE: 6.66 SEC
FEF2575-%PRED-PRE: 119 %
FEF2575-PRE: 1.95 L/SEC
FEF2575-PRED: 1.63 L/SEC
FEFMAX-%PRED-PRE: 104 %
FEFMAX-PRE: 5.7 L/SEC
FEFMAX-PRED: 5.46 L/SEC
FEV1-%PRED-PRE: 81 %
FEV1-PRE: 1.79 L
FEV1FEV6-PRE: 82 %
FEV1FEV6-PRED: 78 %
FEV1FVC-PRE: 82 %
FEV1FVC-PRED: 77 %
FEV1SVC-PRE: 78 %
FEV1SVC-PRED: 75 %
FIFMAX-PRE: 4.14 L/SEC
FRCPLETH-%PRED-PRE: 64 %
FRCPLETH-PRE: 2.05 L
FRCPLETH-PRED: 3.2 L
FVC-%PRED-PRE: 74 %
FVC-PRE: 2.19 L
FVC-PRED: 2.92 L
IC-%PRED-PRE: 99 %
IC-PRE: 2.15 L
IC-PRED: 2.16 L
RVPLETH-%PRED-PRE: 80 %
RVPLETH-PRE: 1.9 L
RVPLETH-PRED: 2.37 L
TLCPLETH-%PRED-PRE: 77 %
TLCPLETH-PRE: 4.2 L
TLCPLETH-PRED: 5.41 L
VA-%PRED-PRE: 70 %
VA-PRE: 3.46 L
VC-%PRED-PRE: 78 %
VC-PRE: 2.3 L
VC-PRED: 2.94 L

## 2024-12-16 DIAGNOSIS — R76.8 ANTI-RNP ANTIBODIES PRESENT: ICD-10-CM

## 2024-12-16 DIAGNOSIS — M35.00 SJOGREN'S SYNDROME, WITH UNSPECIFIED ORGAN INVOLVEMENT (H): Primary | ICD-10-CM

## 2024-12-16 DIAGNOSIS — R94.2 ABNORMAL PFT: ICD-10-CM

## 2024-12-16 DIAGNOSIS — R76.8 ANA POSITIVE: ICD-10-CM

## 2024-12-18 ENCOUNTER — TRANSFERRED RECORDS (OUTPATIENT)
Dept: HEALTH INFORMATION MANAGEMENT | Facility: CLINIC | Age: 79
End: 2024-12-18
Payer: COMMERCIAL

## 2024-12-23 ENCOUNTER — TRANSFERRED RECORDS (OUTPATIENT)
Dept: HEALTH INFORMATION MANAGEMENT | Facility: CLINIC | Age: 79
End: 2024-12-23
Payer: COMMERCIAL

## 2024-12-26 ENCOUNTER — LAB (OUTPATIENT)
Dept: LAB | Facility: CLINIC | Age: 79
End: 2024-12-26
Payer: COMMERCIAL

## 2024-12-31 ENCOUNTER — TRANSFERRED RECORDS (OUTPATIENT)
Dept: HEALTH INFORMATION MANAGEMENT | Facility: CLINIC | Age: 79
End: 2024-12-31
Payer: COMMERCIAL

## 2025-01-08 ENCOUNTER — HOSPITAL ENCOUNTER (OUTPATIENT)
Dept: CT IMAGING | Facility: HOSPITAL | Age: 80
Discharge: HOME | End: 2025-01-08
Attending: STUDENT IN AN ORGANIZED HEALTH CARE EDUCATION/TRAINING PROGRAM
Payer: COMMERCIAL

## 2025-01-08 DIAGNOSIS — R76.8 ANTI-RNP ANTIBODIES PRESENT: ICD-10-CM

## 2025-01-08 DIAGNOSIS — R76.8 ANA POSITIVE: ICD-10-CM

## 2025-01-08 DIAGNOSIS — M35.00 SJOGREN'S SYNDROME, WITH UNSPECIFIED ORGAN INVOLVEMENT: ICD-10-CM

## 2025-01-08 DIAGNOSIS — R94.2 ABNORMAL PFT: ICD-10-CM

## 2025-01-08 PROCEDURE — 71250 CT THORAX DX C-: CPT

## 2025-01-14 ENCOUNTER — OFFICE VISIT (OUTPATIENT)
Dept: PULMONOLOGY | Facility: CLINIC | Age: 80
End: 2025-01-14
Attending: STUDENT IN AN ORGANIZED HEALTH CARE EDUCATION/TRAINING PROGRAM
Payer: COMMERCIAL

## 2025-01-14 VITALS
OXYGEN SATURATION: 99 % | HEART RATE: 99 BPM | SYSTOLIC BLOOD PRESSURE: 122 MMHG | HEIGHT: 67 IN | WEIGHT: 179 LBS | BODY MASS INDEX: 28.09 KG/M2 | DIASTOLIC BLOOD PRESSURE: 78 MMHG

## 2025-01-14 DIAGNOSIS — M35.00 SJOGREN'S SYNDROME, WITH UNSPECIFIED ORGAN INVOLVEMENT: ICD-10-CM

## 2025-01-14 DIAGNOSIS — R76.8 ANTI-RNP ANTIBODIES PRESENT: ICD-10-CM

## 2025-01-14 DIAGNOSIS — R05.3 CHRONIC COUGH: ICD-10-CM

## 2025-01-14 DIAGNOSIS — R06.09 DYSPNEA ON EXERTION: Primary | ICD-10-CM

## 2025-01-14 RX ORDER — BUDESONIDE AND FORMOTEROL FUMARATE DIHYDRATE 80; 4.5 UG/1; UG/1
2 AEROSOL RESPIRATORY (INHALATION) 2 TIMES DAILY
Qty: 10.2 G | Refills: 4 | Status: SHIPPED | OUTPATIENT
Start: 2025-01-14

## 2025-01-14 NOTE — PROGRESS NOTES
Patient oxygen saturation on RA at rest is 99% pulse 99  Oxygen saturation after ambulating 400ft on RA is 97% pulse 112        Patient is ambulatory within his/her home.      Morena Good LPN

## 2025-01-14 NOTE — PROGRESS NOTES
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 Concern for parenchymal changes related to lupus and Sjogren's.   Denies history of asthma or other breathing issues. Noticed both cough and dyspnea for ~3 months. She thinks cough onset with prior to dyspnea. Cough is dry and worse at night. Admits to frequent reflux symptoms on current BID PPI.  History of gastric bypass. Cough is slightly worse with activity. Dyspnea has progressively worsened. Noted with minimal activity and ADLs.  PFT showed normal spirometry, no bronchodilator response tested. Lung volumes show very mild restriction (TLC is 4.20L with LLN of 4.21).  Diffusing capacity when corrected for hemoglobin is mildly reduced.  Recent Hi-res CT chest shows normal parenchyma and airways. There is moderate ascites and cirrhosis noted.    #. Dyspnea: Unsure of etiology given normal lung exam, imaging, and spirometry.  Concern for pneumonitis or ILD, ruled out with normal CT chest. Very mild diffusion defect with normal echocardiogram, stress test, and imaging.  Recommend yearly PFTs to monitor this. Very mild restriction likely related to ascites and cirrhosis given normal imaging and spirometry.  Consider pulmonary rehab or graded exercise program  Recommend yearly PFTs to monitor diffusion defect.  Consider MIP and MEP if inhaler not helpful to rule out neuromuscular weakness.  #.  Cough: Dry cough that is worse when laying down, normal spirometry, and significant reflux symptoms would suggest upper airway cough syndrome secondary to reflux. She is interested in a trial of a ICS/LABA which is appropriate given symptom burden.  Symbicort (80-4.5), 2 puffs twice daily. She was encouraged to use this every day no matter what until  follow-up.  Rinse and gargle after use.  If inhaler is not helpful we will discontinue at follow-up.  Recommend she follow-up with [facility] regarding continued cough on twice daily omeprazole.  #. HCM: UTD with COVID-vaccine (10/2024), PCV 13, PPSV23, Tdap (2010)  Recommend she stay up-to-date with respiratory vaccines.  Recommend seasonal flu and RSV vaccines      RTC 6 weeks via telephone for recheck    Cici Oscar, [place]  Pulmonary Medicine  ______________________________________________________________________________    CC:   Chief Complaint   Patient presents with    Consult     Shortness of breath      HPI:   Micheline presents today for evaluation of dyspnea and cough. Referred from rheumatologist for RNP antibody and reports of dyspnea.   Has noticed SOB with activity. She thinks this has been happening for a few months.   Also reporting dry cough for the past few months. Cough has been going on longer than SOB and is most noticed when relaxed or during the night.   Occasional wheeze that is caused by cough.   Admits to frequent breakthrough heartburn on omeprazole BID.   Can walk 1/2 block before feeling winded. Previously was able to walk on the track at the [facility]. Stairs and inclines are hard. Is able to complete all ADLs but is very winded afterward.     Reports seasonal allergy symptoms in the summer. Will take OTC antihistamine with good results.   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   Recently lost her  who she cared for. Has been less active.     Cardiac stress test. Today we reviewed the result noting there is normal ejection fraction without any signs of ischemia. Echos showed estimated right ventricular systolic pressure 24 mmHg (right atrial pressure of 5 mmHg).     FH- no known lung disease     PMH:  Patient Active Problem List    Diagnosis Date Noted    Pain of right lower leg 10/29/2024     Priority: Medium    Neuropathy of both  feet 10/29/2024     Priority: Medium    Autoimmune hepatitis (H) 05/15/2024     Priority: Medium    Atrophic gastritis 05/03/2024     Priority: Medium    Polyp of duodenum 05/03/2024     Priority: Medium    Iron deficiency anemia 04/30/2024     Priority: Medium    Cirrhosis of liver (H) 09/26/2023     Priority: Medium    Cervical spine pain 07/14/2023     Priority: Medium    Myofascial pain 07/14/2023     Priority: Medium    Arthropathy of cervical facet joint 07/14/2023     Priority: Medium    H/O cervical spine surgery 07/14/2023     Priority: Medium    Nonalcoholic steatohepatitis 06/07/2021     Priority: Medium    Nausea and vomiting 04/29/2021     Priority: Medium    Abnormal liver function tests 04/29/2021     Priority: Medium    Gastritis 10/13/2020     Priority: Medium     Created by Conversion      Nonspecific abdominal pain 10/09/2020     Priority: Medium    Precordial chest pain      Priority: Medium    Abnormal nuclear stress test 08/15/2019     Priority: Medium     Added automatically from request for surgery [id number]        Localized primary carpometacarpal osteoarthritis 10/24/2018     Priority: Medium    Sjogren's syndrome 08/20/2018     Priority: Medium    Primary osteoarthritis involving multiple joints 08/20/2018     Priority: Medium    THEE positive 08/20/2018     Priority: Medium    Anxiety      Priority: Medium     Created by Conversion  Replacement Utility updated for latest IMO load        Hypertension      Priority: Medium     Created by Conversion  Replacement Utility updated for latest IMO load        Rotator Cuff Tendonitis      Priority: Medium     Created by Conversion  Replacement Utility updated for latest IMO load        Allergic Rhinitis      Priority: Medium     Created by Conversion  Replacement Utility updated for latest IMO load        Menopause Has Occurred      Priority: Medium     Created by Conversion  Replacement Utility updated for latest IMO load        Hypocomplementemia  (H) 01/13/2016     Priority: Medium    Subacute cutaneous lupus erythematosus 10/13/2015     Priority: Medium    Cervical radiculopathy at C5 06/17/2015     Priority: Medium    Myelopathy, spondylogenic, cervical 06/17/2015     Priority: Medium    Diverticular disease of colon 09/23/2014     Priority: Medium     PSH:  Past Surgical History:   Procedure Laterality Date    ARTHROPLASTY KNEE BILATERAL      BIOPSY BREAST Right 2005    benign    CV CORONARY ANGIOGRAM N/A 8/22/2019    Procedure: Coronary Angiogram;  Surgeon: Melissa Vasquez MD;  Location: [facility];  Service: Cardiology    CV LEFT HEART CATHETERIZATION WITHOUT LEFT VENTRICULOGRAM Left 8/22/2019    Procedure: Left Heart Catheterization Without Left Ventriculogram;  Surgeon: Melissa Vasquez MD;  Location: [facility];  Service: Cardiology    FISSURECTOMY RECTUM      HC REMOVAL GALLBLADDER      Description: Cholecystectomy;  Recorded: 04/16/2012;    HYSTERECTOMY  1986    CT TOT DISC ARTHRP ART DISC ANT APPRO 1 NTRSPC CRV N/A 6/17/2015    Procedure:  C45 MOBI-C DISC ARTHORPLASTY, ATTEMPT MOBI C C56, ANTERIOR CERVICAL DISCECTOMY AND FUSION IF UNABLE TO PLACE ;  Surgeon: Korina Beltrán MD;  Location: [facility] OR;  Service: Spine    Z GASTROPLASTY,OBESITY,OTHER      Description: Gastric Surgery For Morbid Obesity Gastric Stapling;  Recorded: 04/16/2012;     Current Meds:  Current Outpatient Medications   Medication Sig Dispense Refill    amLODIPine (NORVASC) 5 MG tablet TAKE 1 TABLET BY MOUTH EVERY DAY 90 tablet 3    azaTHIOprine (IMURAN) 50 MG tablet Take 1 tablet by mouth daily at 2 pm.      baclofen (LIORESAL) 10 MG tablet TAKE 0.5-1 TABLETS (5-10 MG) BY MOUTH 2 TIMES DAILY AS NEEDED FOR MUSCLE SPASMS 180 tablet 1    CARBOXYMETHYLCELL/HYPROMELLOSE (GENTEAL GEL OPHT) Apply 2 drops to eye as needed.      citalopram (CELEXA) 20 MG tablet TAKE 1 & 1/2 TABLETS BY MOUTH ONCE A  tablet 1    diphenhydrAMINE-acetaminophen (TYLENOL  PM)  MG tablet Take 1 tablet by mouth nightly as needed for sleep.      fluocinonide (LIDEX) 0.05 % external cream Apply topically 2 times daily.      furosemide (LASIX) 20 MG tablet TAKE 2 TABLETS (40 MG) BY MOUTH EVERY MORNING 180 tablet 1    hydroxychloroquine (PLAQUENIL) 200 MG tablet Take 1 tablet (200 mg) by mouth daily at 2 pm. 180 tablet 0    loratadine (CLARITIN) 10 mg tablet Take 10 mg by mouth daily.      losartan (COZAAR) 100 MG tablet TAKE 1 TABLET BY MOUTH EVERY DAY 90 tablet 3    OMEGA-3/DHA/EPA/FISH OIL (FISH OIL-OMEGA-3 FATTY ACIDS) 300-1,000 mg capsule Take 2 g by mouth daily.      omeprazole (PRILOSEC) 20 MG capsule [OMEPRAZOLE (PRILOSEC) 20 MG CAPSULE] Take 2 capsules (40 mg total) by mouth 2 (two) times a day before meals. 120 capsule 1    pilocarpine (SALAGEN) 5 MG tablet Take 1 tablet (5 mg) by mouth 2 times daily. 180 tablet 3    polyvinyl alcohol (LIQUIFILM TEARS) 1.4 % ophthalmic solution Place 1 drop into both eyes as needed.      RESTASIS MULTIDOSE 0.05 % ophthalmic emulsion Place 1 drop into both eyes 2 times daily.      spironolactone (ALDACTONE) 25 MG tablet Take 50 mg by mouth daily.      Ascorbic Acid (VITAMIN C) 500 MG CHEW Take 1,000 mg by mouth.      multivitamin therapeutic tablet [MULTIVITAMIN THERAPEUTIC TABLET] Take 1 tablet by mouth daily. (Patient not taking: Reported on 5/1/2024)      predniSONE (DELTASONE) 5 MG tablet Take 30 mg by mouth daily      valACYclovir (VALTREX) 1000 mg tablet Take 1 tablet by mouth 3 times daily (Patient not taking: Reported on 5/1/2024)       Current Facility-Administered Medications   Medication Dose Route Frequency Provider Last Rate Last Admin    ceFAZolin (ANCEF) 2 g in sodium chloride 0.9 % 100 mL intermittent infusion  2 g Intravenous Q8H Leonel Arrington  mL/hr at 12/04/23 1052 2 g at 12/04/23 1052     Allergies:  Allergies   Allergen Reactions    Hydroxychloroquine Shortness Of Breath    Celecoxib GI Disturbance     "Codeine Unknown     Emesis, tired    Egg White [Egg White (Egg Protein)] Unknown     Dumping syndrome     Social Hx:  Marital status: lives alone   [facility] no concern for [place].  Occupational history: Worked at [employer] for 40 years.    service: none  Pets: dog   Smoking history: never smoker   Recreational drug use: none. No vape    Family HX: family history includes Breast Cancer (age of onset: 52.00) in her sister; Breast Cancer (age of onset: 60.00) in her sister; Cancer in her sister.    ROS:   10-point review performed and notable for the above mentioned symptoms. The remainder reviewed and negative.     Physical Exam:  /78   Pulse 99   Ht 1.702 m (5' 7\")   Wt 81.2 kg (179 lb)   SpO2 99%   BMI 28.04 kg/m      Physical Exam  Constitutional:       General: She is not in acute distress.     Appearance: She is not ill-appearing.   Cardiovascular:      Rate and Rhythm: Normal rate and regular rhythm.      Heart sounds: Normal heart sounds.   Pulmonary:      Effort: Pulmonary effort is normal. No respiratory distress.      Breath sounds: No wheezing or rhonchi.   Musculoskeletal:      Right lower leg: No edema.      Left lower leg: No edema.   Neurological:      Mental Status: She is alert.   Psychiatric:         Behavior: Behavior normal.         PFT's     12/12/2024:      Impression:  Full Pulmonary Function Test is abnormal.  PFTs are consistent with  no  obstructive disease.  There is no hyperinflation.  There is no air-trapping.  TLC is very mildly reduced suggesting restriction, FVC is normal.  Diffusion capacity when corrected for hemoglobin is mildly reduced.    Labs:   personally reviewed in the EMR.   Latest Reference Range & Units 11/25/24 14:19 12/12/24 15:37   Hemoglobin 11.7 - 15.7 g/dL 13.4    Hemoglobin POCT g/dL  13.5     Imaging studies: personally reviewed and interpreted. Below are the Radiology interpretations.    CT CHEST HI-RESOLUTION WO CONTRAST, " DATE: 1/8/2025  INDICATION:  Sjogren's syndrome, with unspecified organ involvement, THEE positive, Anti-RNP antibodies present, Abnormal PFT  COMPARISON: 10/4/2023  FINDINGS:   LUNGS AND PLEURA: Tiny benign intrapulmonary lymph nodes in subpleural positions in both lungs. Otherwise negative. No reticulation, bronchiectasis, honeycombing, airspace consolidation, groundglass attenuation, or evidence of gas trapping.   UPPER ABDOMEN: Gastric bypass. Left upper quadrant embolization coils. Cirrhosis. Liver cysts. Moderate ascites.                                                                 IMPRESSION:   No evidence of interstitial lung disease

## 2025-01-14 NOTE — PATIENT INSTRUCTIONS
It was a pleasure seeing you in clinic today. This is what we discussed:    We will see if an inhaler helps your breathing or cough. START the Symbicort, use this 2 puffs twice daily no matter what. Rinse and gargle after use.   Your lung function shows VERY MILD restriction (lungs not fully expanding) and diffusion defect (not make oxygen in a normal way).   Your CT scan is normal. Your lung tissue is healthy and airway are normal.   Follow up 6 week for inhaler recheck   If you have worsening symptoms, questions, or need to speak with the nurse please call [phone].

## 2025-01-15 ENCOUNTER — TELEPHONE (OUTPATIENT)
Dept: PULMONOLOGY | Facility: CLINIC | Age: 80
End: 2025-01-15
Payer: COMMERCIAL

## 2025-01-15 NOTE — TELEPHONE ENCOUNTER
Per Cici Oscar CNP:  Yes. I did not say this could not be used while on prednisone. We give people both all the time.     Spoke with Adelina and informed her it would be fine to take these together.

## 2025-01-15 NOTE — TELEPHONE ENCOUNTER
[facility] Call Center    Phone Message    May a detailed message be left on voicemail: yes     Reason for Call: Medication Question or concern regarding medication   Prescription Clarification  Name of Medication: budesonide-formoterol (SYMBICORT) 80-4.5 MCG/ACT Inhaler   Prescribing Provider: fabio   Pharmacy: [facility]/pharmacy #[id number] - [place], [place] - [address] [place] [address] E    What on the order needs clarification? Patient would like a call back to discuss as she was given the Symbicort to take and she said it says to not take if you are on prednisone and she Is on that and can not come off of it due to her lupus and would like to see if there is something else she can take . Please advise.       Action Taken: Message routed to:  Other: pulm    Travel Screening: Not Applicable     Date of Service: 01/15/25                                                                     

## 2025-01-29 DIAGNOSIS — F41.1 ANXIETY STATE: ICD-10-CM

## 2025-01-29 RX ORDER — CITALOPRAM HYDROBROMIDE 20 MG/1
TABLET ORAL
Qty: 135 TABLET | Refills: 1 | Status: SHIPPED | OUTPATIENT
Start: 2025-01-29

## 2025-02-11 DIAGNOSIS — R18.8 CIRRHOSIS OF LIVER WITH ASCITES, UNSPECIFIED HEPATIC CIRRHOSIS TYPE (H): Primary | ICD-10-CM

## 2025-02-11 DIAGNOSIS — K74.60 CIRRHOSIS OF LIVER WITH ASCITES, UNSPECIFIED HEPATIC CIRRHOSIS TYPE (H): Primary | ICD-10-CM

## 2025-02-15 DIAGNOSIS — I10 ESSENTIAL HYPERTENSION: ICD-10-CM

## 2025-02-16 NOTE — PROGRESS NOTES
RETURN PATIENT RHEUMATOLOGY VISIT     Assessment & Plan     Problem List    HTN  Atrophic gastritic  Osteoarthritis       Systemic lupus erythematosus  Sjogren's Disease   Comment: THEE: 1:2560, speckled pattern, +SSA/+SSB, +RNP, hypocomplementemia, cutaneous lupus, SICCA symptoms.   History of unprovoked DVT 3/2024, APS antibodies at the time were negative and neg again on check 10/2024  Reportedly confirmed as SCLE on biopsy at outside hospital, but I do not have these records.    Appears well today with no signs of active lupus flare besides ongoing palpable purpura over chest, improved from prior.  She is under the care of dermatologist for her cutaneous lupus and last seen 4/2023.     Plan:   -continue plaquenil 400mg daily   -due to difficulty with insurance coverage stop pilocarpine 5mg BID and start cevimeline 30mg 1-3x daily   -continue carboxymethycell drops for eyes   -is on Imuran and prednisone for autoimmune hepatitis   -disease activity labs today  -make follow up appointment with dermatology       Long Term Plaquenil   Comment: Reports she started Plaquenil only a few months ago  S/p eye exam at [place] [facility] 12/2024   Plan:   -yearly plaquenil eye exams    Autoimmune Hepatitis with Cirrhosis   Comment: Is followed by [facility] and is currently on imuran and prednisone 10mg daily   Plan:   -Continue to follow with [facility]  -recently went from       Orders Placed This Encounter   Procedures    ALT    AST    Creatinine    CBC with platelets    Erythrocyte sedimentation rate auto    CRP inflammation    DNA double stranded antibodies    Complement C3    Complement C4    UA with Microscopic reflex to Culture    Protein  random urine        30 minutes spent on the day of the encounter doing chart review, history and exam, counseling and documentation.     The longitudinal plan of care for the diagnosis(es)/condition(s) as documented were addressed during this visit. Due to the added complexity in care, I will  continue to support Adelina in the subsequent management and with ongoing continuity of care.    RTC in 3 months     Subjective       I last saw her PFTs showed mild restriction.  She has since been seen by pulmonary and has had a normal CT chest and it was felt that her PFT abnormalities are likely related to ascites and cirrhosis with recommendation for yearly PFTs.    I was able to review her last GI note as well as her last rheumatology note.  Dermatology note from April 2024 describes her subacute cutaneous lupus erythematosus as red/purple-colored lesions over her arms and upper chest described as  nonpalpable purpura.  Previously treated with oral prednisone and topical triamcinolone. Plan was to continue with hydroxychloroquine and it was felt that her condition was clinically well-controlled.    Her GI doctor went down on her prednisone from 20mg to 10mg and reports since that decrease she has had more pain over her shoulders. Shoulder pain is not constant but worse with certain movements. Also having some left hip pain with movement. No worsening of her chronic headaches - responsive to advil and over her posterior scalp, no temporal tenderness, no scalp tenderness, no vision changes, no jaw claudication.     No worsening of her rash, no fevers, no other increased joint pain, stable dry eyes/mouth. Feels her dry mouth gets bad around 3pm before she takes her evening dose of pilocarpine but had syncope when she was taking this three times a day.       HPI    Patient presents to establish care for Sjogren's/Lupus overlap.  Her diagnosis is based on positive THEE: 1:2560, speckled pattern, +SSA/+SSB, +RNP, hypocomplementemia, and cutaneous lupus. Was previously seen by Saranya Law last seen 3/2024.  She was also seen by rheumatology at [facility] last seen also 3/2024.  Patient is a poor historian but per review her last note from [place], it appears that when she was seen in March she had been having  worsening rash for the last year and was not on any medications for her lupus.  Reportedly she had tried Plaquenil 5 years prior but discontinued it after a week due to shortness of breath.  It appears that her recurrent rash has been managed with intermittent steroids per dermatology with improvement, and that biopsy confirmed SCLE.  However I do not have these records at this time.  She had also been noted to have elevated liver enzymes and underwent liver biopsy in June of 2021 that demonstrated mild steatosis and hepatocyte ballooning without features of autoimmune hepatitis or other chronic liver disease. Stage IV fibrosis was noted. Anti smooth muscle antibody and antimitochondrial antibody have been negative.Hepatitis testing has also been negative.  She currently follows with [facility] and is on Imuran and prednisone 20 mg daily for presumed autoimmune hepatitis given improvement of her transaminitis with steroids.    Per my discussion with Ms. aGrcía today, she reports she was diagnosed with lupus initially over 10 years ago after developing skin rash after prolonged sun exposure during a trip to [place].     She denies any history of oral ulcers, does get arthralgias over her hands, and she continues to have photosensitivity with flares of rash after sun exposure.  She shows me a picture on her phone from November 2023 which reveals erythematous plaques with scale over her arms and chest consistent with SCLE.   No raynaud's, no oral or nasal ulcers, no hx of inflammatory eye disease, no +GERD on omeprazole, +dyspnea - had a recent stress test due to this which was normal, occasional cough, no chest pain, no fevers or weigh loss, no pleurisy   Has a history of a blood clot in her leg March 2024 s/p 3 months of blood thinner, no history of miscarriages, 3 kids   +dry eyes +dry mouth, no numbness or tingling  No salivary gland swelling    No family  Hx of known autoimmune disease  Not a previous smoker. Not a  current smoker.      Lab and Imaging review:    I reviewed recent labs and imaging includin2024 normal creatinine, CBC with low platelets at 137  10/2024 no significant proteinuria,  C4 normalized, C3 low at 62 but improved from prior, dsDNA within normal limits, normal ESR and CRP  Negative cardiolipin antibody, beta-2 glycoprotein, lupus anticoagulant,    2024 [facility] [place] [facility] labs through care everywhere positive THEE 1: 2560, SSA over 8, SSB over 8, RNP over 8, negative SCL 70, Sharee 1, dsDNA, Lim,    3/2024 negative smooth muscle antibody, negative mitochondrial antibody, normal beta-2 glycoprotein, cardiolipin antibodies, and lupus anticoagulant      Current Outpatient Medications:     amLODIPine (NORVASC) 5 MG tablet, TAKE 1 TABLET BY MOUTH EVERY DAY, Disp: 90 tablet, Rfl: 3    azaTHIOprine (IMURAN) 50 MG tablet, Take 1 tablet by mouth daily at 2 pm., Disp: , Rfl:     baclofen (LIORESAL) 10 MG tablet, TAKE 0.5-1 TABLETS (5-10 MG) BY MOUTH 2 TIMES DAILY AS NEEDED FOR MUSCLE SPASMS, Disp: 180 tablet, Rfl: 1    budesonide-formoterol (SYMBICORT) 80-4.5 MCG/ACT Inhaler, Inhale 2 puffs into the lungs 2 times daily., Disp: 10.2 g, Rfl: 4    CARBOXYMETHYLCELL/HYPROMELLOSE (GENTEAL GEL OPHT), Apply 2 drops to eye as needed., Disp: , Rfl:     cevimeline (EVOXAC) 30 MG capsule, Take 1 capsule (30 mg) by mouth 3 times daily., Disp: 90 capsule, Rfl: 1    citalopram (CELEXA) 20 MG tablet, TAKE 1 & 1/2 TABLETS BY MOUTH ONCE A DAY, Disp: 135 tablet, Rfl: 1    diphenhydrAMINE-acetaminophen (TYLENOL PM)  MG tablet, Take 1 tablet by mouth nightly as needed for sleep., Disp: , Rfl:     fluocinonide (LIDEX) 0.05 % external cream, Apply topically 2 times daily., Disp: , Rfl:     furosemide (LASIX) 20 MG tablet, TAKE 2 TABLETS (40 MG) BY MOUTH EVERY MORNING, Disp: 180 tablet, Rfl: 1    hydroxychloroquine (PLAQUENIL) 200 MG tablet, Take 1 tablet (200 mg) by mouth daily at 2 pm., Disp: 180 tablet, Rfl: 0    loratadine  (CLARITIN) 10 mg tablet, Take 10 mg by mouth daily., Disp: , Rfl:     losartan (COZAAR) 100 MG tablet, TAKE 1 TABLET BY MOUTH EVERY DAY, Disp: 90 tablet, Rfl: 3    OMEGA-3/DHA/EPA/FISH OIL (FISH OIL-OMEGA-3 FATTY ACIDS) 300-1,000 mg capsule, Take 2 g by mouth daily., Disp: , Rfl:     omeprazole (PRILOSEC) 20 MG capsule, [OMEPRAZOLE (PRILOSEC) 20 MG CAPSULE] Take 2 capsules (40 mg total) by mouth 2 (two) times a day before meals., Disp: 120 capsule, Rfl: 1    polyvinyl alcohol (LIQUIFILM TEARS) 1.4 % ophthalmic solution, Place 1 drop into both eyes as needed., Disp: , Rfl:     RESTASIS MULTIDOSE 0.05 % ophthalmic emulsion, Place 1 drop into both eyes 2 times daily., Disp: , Rfl:     spironolactone (ALDACTONE) 25 MG tablet, Take 50 mg by mouth daily., Disp: , Rfl:     Current Facility-Administered Medications:     ceFAZolin (ANCEF) 2 g in sodium chloride 0.9 % 100 mL intermittent infusion, 2 g, Intravenous, Q8H, Leonel Arrington DO, Last Rate: 200 mL/hr at 12/04/23 1052, 2 g at 12/04/23 1052  Allergies:  Allergies   Allergen Reactions    Hydroxychloroquine Shortness Of Breath    Celecoxib GI Disturbance    Codeine Unknown     Emesis, tired    Egg White [Egg White (Egg Protein)] Unknown     Dumping syndrome     Medical Hx:  Past Medical History:   Diagnosis Date    GERD (gastroesophageal reflux disease)     Hypertension     Neck pain     Osteoarthritis 1/13/2016    Systemic lupus (H)      Surgical Hx:  Past Surgical History:   Procedure Laterality Date    ARTHROPLASTY KNEE BILATERAL      BIOPSY BREAST Right 2005    benign    CV CORONARY ANGIOGRAM N/A 8/22/2019    Procedure: Coronary Angiogram;  Surgeon: Melissa Vasquez MD;  Location: [facility] Cath Lab;  Service: Cardiology    CV LEFT HEART CATHETERIZATION WITHOUT LEFT VENTRICULOGRAM Left 8/22/2019    Procedure: Left Heart Catheterization Without Left Ventriculogram;  Surgeon: Melissa Vasquez MD;  Location: [facility] Cath Lab;  Service: Cardiology     FISSURECTOMY RECTUM      HC REMOVAL GALLBLADDER      Description: Cholecystectomy;  Recorded: 04/16/2012;    HYSTERECTOMY  1986    MA TOT DISC ARTHRP ART DISC ANT APPRO 1 NTRSPC CRV N/A 6/17/2015    Procedure:  C45 MOBI-C DISC ARTHORPLASTY, ATTEMPT MOBI C C56, ANTERIOR CERVICAL DISCECTOMY AND FUSION IF UNABLE TO PLACE ;  Surgeon: Korina Beltrán MD;  Location: [facility];  Service: Spine    [facility] GASTROPLASTY,OBESITY,OTHER      Description: Gastric Surgery For Morbid Obesity Gastric Stapling;  Recorded: 04/16/2012;     Family Hx:  Family History   Problem Relation Age of Onset    Breast Cancer Sister 52.00    Cancer Sister     Breast Cancer Sister 60.00     Social Hx:  Social History     Tobacco Use    Smoking status: Never     Passive exposure: Past    Smokeless tobacco: Never   Vaping Use    Vaping status: Never Used   Substance Use Topics    Alcohol use: Yes     Comment: Alcoholic Drinks/day: glass of wine on occasion    Drug use: No        Objective   Physical Exam   /60 (BP Location: Right arm)   Pulse 88   Wt 89.8 kg (197 lb 14.4 oz)   BMI 31.00 kg/m    General: alert, well appearing, no distress  HEENT:  clear conjunctiva, no oral or nasal ulcers, no cervical lymphadenopathy  Cardiac: normal rate and rhythm, no murmur, rubs or gallops   Pulm: normal respiratory effort, clear to auscultation bilaterally   MSK: no synovitis   Skin: non-blanching purpuric papules/patches over chest, improved from prior, no longer in large patches but more nickel to quarter sized, no longer with active rashes over bilateral arms, but with hyperpigmentation.          Denise Mchugh MD  Rheumatology

## 2025-02-17 ENCOUNTER — OFFICE VISIT (OUTPATIENT)
Dept: RHEUMATOLOGY | Facility: CLINIC | Age: 80
End: 2025-02-17
Payer: COMMERCIAL

## 2025-02-17 ENCOUNTER — LAB (OUTPATIENT)
Dept: LAB | Facility: CLINIC | Age: 80
End: 2025-02-17
Payer: COMMERCIAL

## 2025-02-17 VITALS
DIASTOLIC BLOOD PRESSURE: 60 MMHG | BODY MASS INDEX: 31 KG/M2 | WEIGHT: 197.9 LBS | SYSTOLIC BLOOD PRESSURE: 120 MMHG | HEART RATE: 88 BPM

## 2025-02-17 DIAGNOSIS — M35.00 SJOGREN'S SYNDROME, WITH UNSPECIFIED ORGAN INVOLVEMENT: Primary | ICD-10-CM

## 2025-02-17 DIAGNOSIS — M35.00 SJOGREN'S SYNDROME, WITH UNSPECIFIED ORGAN INVOLVEMENT: ICD-10-CM

## 2025-02-17 LAB
ALBUMIN MFR UR ELPH: <6 MG/DL
ALBUMIN UR-MCNC: NEGATIVE MG/DL
ALT SERPL W P-5'-P-CCNC: 44 U/L (ref 0–50)
APPEARANCE UR: CLEAR
AST SERPL W P-5'-P-CCNC: 55 U/L (ref 0–45)
BACTERIA #/AREA URNS HPF: ABNORMAL /HPF
BILIRUB UR QL STRIP: NEGATIVE
COLOR UR AUTO: YELLOW
CREAT SERPL-MCNC: 0.98 MG/DL (ref 0.51–0.95)
CREAT UR-MCNC: 24.9 MG/DL
CRP SERPL-MCNC: 24.9 MG/L
EGFRCR SERPLBLD CKD-EPI 2021: 59 ML/MIN/1.73M2
ERYTHROCYTE [DISTWIDTH] IN BLOOD BY AUTOMATED COUNT: 13.6 % (ref 10–15)
ERYTHROCYTE [SEDIMENTATION RATE] IN BLOOD BY WESTERGREN METHOD: 25 MM/HR (ref 0–30)
GLUCOSE UR STRIP-MCNC: NEGATIVE MG/DL
HCT VFR BLD AUTO: 38.4 % (ref 35–47)
HGB BLD-MCNC: 12.9 G/DL (ref 11.7–15.7)
HGB UR QL STRIP: NEGATIVE
KETONES UR STRIP-MCNC: NEGATIVE MG/DL
LEUKOCYTE ESTERASE UR QL STRIP: ABNORMAL
MCH RBC QN AUTO: 32.2 PG (ref 26.5–33)
MCHC RBC AUTO-ENTMCNC: 33.6 G/DL (ref 31.5–36.5)
MCV RBC AUTO: 96 FL (ref 78–100)
NITRATE UR QL: NEGATIVE
PH UR STRIP: 6 [PH] (ref 5–8)
PLATELET # BLD AUTO: 108 10E3/UL (ref 150–450)
PROT/CREAT 24H UR: NORMAL MG/G{CREAT}
RBC # BLD AUTO: 4.01 10E6/UL (ref 3.8–5.2)
RBC #/AREA URNS AUTO: ABNORMAL /HPF
SP GR UR STRIP: <=1.005 (ref 1–1.03)
SQUAMOUS #/AREA URNS AUTO: ABNORMAL /LPF
UROBILINOGEN UR STRIP-ACNC: 0.2 E.U./DL
WBC # BLD AUTO: 11.2 10E3/UL (ref 4–11)
WBC #/AREA URNS AUTO: ABNORMAL /HPF

## 2025-02-17 PROCEDURE — 85652 RBC SED RATE AUTOMATED: CPT

## 2025-02-17 PROCEDURE — 85027 COMPLETE CBC AUTOMATED: CPT

## 2025-02-17 PROCEDURE — 86160 COMPLEMENT ANTIGEN: CPT

## 2025-02-17 PROCEDURE — 99214 OFFICE O/P EST MOD 30 MIN: CPT | Performed by: STUDENT IN AN ORGANIZED HEALTH CARE EDUCATION/TRAINING PROGRAM

## 2025-02-17 PROCEDURE — 36415 COLL VENOUS BLD VENIPUNCTURE: CPT

## 2025-02-17 PROCEDURE — 82565 ASSAY OF CREATININE: CPT

## 2025-02-17 PROCEDURE — 84155 ASSAY OF PROTEIN SERUM: CPT | Performed by: PATHOLOGY

## 2025-02-17 PROCEDURE — G2211 COMPLEX E/M VISIT ADD ON: HCPCS | Performed by: STUDENT IN AN ORGANIZED HEALTH CARE EDUCATION/TRAINING PROGRAM

## 2025-02-17 PROCEDURE — 84460 ALANINE AMINO (ALT) (SGPT): CPT

## 2025-02-17 PROCEDURE — 86225 DNA ANTIBODY NATIVE: CPT

## 2025-02-17 PROCEDURE — 81001 URINALYSIS AUTO W/SCOPE: CPT

## 2025-02-17 PROCEDURE — 84156 ASSAY OF PROTEIN URINE: CPT

## 2025-02-17 PROCEDURE — 84450 TRANSFERASE (AST) (SGOT): CPT

## 2025-02-17 PROCEDURE — 86140 C-REACTIVE PROTEIN: CPT

## 2025-02-17 RX ORDER — LOSARTAN POTASSIUM 100 MG/1
TABLET ORAL
Qty: 90 TABLET | Refills: 2 | Status: SHIPPED | OUTPATIENT
Start: 2025-02-17

## 2025-02-17 RX ORDER — CEVIMELINE HYDROCHLORIDE 30 MG/1
30 CAPSULE ORAL 3 TIMES DAILY
Qty: 90 CAPSULE | Refills: 1 | Status: SHIPPED | OUTPATIENT
Start: 2025-02-17

## 2025-02-18 LAB
C3 SERPL-MCNC: 64 MG/DL (ref 81–157)
C4 SERPL-MCNC: 20 MG/DL (ref 13–39)
DSDNA AB SER-ACNC: 8.3 IU/ML

## 2025-02-20 LAB — TOTAL PROTEIN SERUM FOR ELP: 6.8 G/DL (ref 6.4–8.3)

## 2025-02-25 ENCOUNTER — ANCILLARY PROCEDURE (OUTPATIENT)
Dept: RADIOLOGY | Facility: CLINIC | Age: 80
End: 2025-02-25
Payer: COMMERCIAL

## 2025-03-11 ENCOUNTER — LAB (OUTPATIENT)
Dept: LAB | Facility: CLINIC | Age: 80
End: 2025-03-11
Payer: COMMERCIAL

## 2025-03-11 ENCOUNTER — HOSPITAL ENCOUNTER (OUTPATIENT)
Dept: ULTRASOUND IMAGING | Facility: HOSPITAL | Age: 80
Discharge: HOME | End: 2025-03-11
Attending: INTERNAL MEDICINE | Admitting: INTERNAL MEDICINE
Payer: COMMERCIAL

## 2025-03-11 DIAGNOSIS — R18.8 CIRRHOSIS OF LIVER WITH ASCITES, UNSPECIFIED HEPATIC CIRRHOSIS TYPE (H): ICD-10-CM

## 2025-03-11 DIAGNOSIS — K74.60 CIRRHOSIS OF LIVER WITH ASCITES, UNSPECIFIED HEPATIC CIRRHOSIS TYPE (H): ICD-10-CM

## 2025-03-11 LAB
ERYTHROCYTE [DISTWIDTH] IN BLOOD BY AUTOMATED COUNT: 13.5 % (ref 10–15)
HCT VFR BLD AUTO: 37.9 % (ref 35–47)
HGB BLD-MCNC: 13 G/DL (ref 11.7–15.7)
INR PPP: 1.07 (ref 0.85–1.15)
MCH RBC QN AUTO: 32.3 PG (ref 26.5–33)
MCHC RBC AUTO-ENTMCNC: 34.3 G/DL (ref 31.5–36.5)
MCV RBC AUTO: 94 FL (ref 78–100)
PLATELET # BLD AUTO: 151 10E3/UL (ref 150–450)
RBC # BLD AUTO: 4.02 10E6/UL (ref 3.8–5.2)
WBC # BLD AUTO: 4.2 10E3/UL (ref 4–11)

## 2025-03-11 PROCEDURE — 85027 COMPLETE CBC AUTOMATED: CPT

## 2025-03-11 PROCEDURE — 80053 COMPREHEN METABOLIC PANEL: CPT

## 2025-03-11 PROCEDURE — 85610 PROTHROMBIN TIME: CPT

## 2025-03-11 PROCEDURE — 76705 ECHO EXAM OF ABDOMEN: CPT

## 2025-03-11 PROCEDURE — 82105 ALPHA-FETOPROTEIN SERUM: CPT

## 2025-03-11 PROCEDURE — 36415 COLL VENOUS BLD VENIPUNCTURE: CPT

## 2025-03-12 LAB
AFP SERPL-MCNC: 2.6 NG/ML
ALBUMIN SERPL BCG-MCNC: 3.9 G/DL (ref 3.5–5.2)
ALP SERPL-CCNC: 109 U/L (ref 40–150)
ALT SERPL W P-5'-P-CCNC: 39 U/L (ref 0–50)
ANION GAP SERPL CALCULATED.3IONS-SCNC: 12 MMOL/L (ref 7–15)
AST SERPL W P-5'-P-CCNC: 68 U/L (ref 0–45)
BILIRUB SERPL-MCNC: 1.5 MG/DL
BUN SERPL-MCNC: 9.6 MG/DL (ref 8–23)
CALCIUM SERPL-MCNC: 9 MG/DL (ref 8.8–10.4)
CHLORIDE SERPL-SCNC: 102 MMOL/L (ref 98–107)
CREAT SERPL-MCNC: 0.91 MG/DL (ref 0.51–0.95)
EGFRCR SERPLBLD CKD-EPI 2021: 64 ML/MIN/1.73M2
GLUCOSE SERPL-MCNC: 86 MG/DL (ref 70–99)
HCO3 SERPL-SCNC: 27 MMOL/L (ref 22–29)
POTASSIUM SERPL-SCNC: 3.6 MMOL/L (ref 3.4–5.3)
PROT SERPL-MCNC: 7.2 G/DL (ref 6.4–8.3)
SODIUM SERPL-SCNC: 141 MMOL/L (ref 135–145)

## 2025-03-16 DIAGNOSIS — M35.00 SJOGREN'S SYNDROME, WITH UNSPECIFIED ORGAN INVOLVEMENT: ICD-10-CM

## 2025-03-17 RX ORDER — CEVIMELINE HYDROCHLORIDE 30 MG/1
30 CAPSULE ORAL 3 TIMES DAILY
Qty: 270 CAPSULE | Refills: 1 | Status: SHIPPED | OUTPATIENT
Start: 2025-03-17

## 2025-03-18 DIAGNOSIS — I10 ESSENTIAL HYPERTENSION: ICD-10-CM

## 2025-03-18 RX ORDER — AMLODIPINE BESYLATE 5 MG/1
TABLET ORAL
Qty: 90 TABLET | Refills: 2 | Status: SHIPPED | OUTPATIENT
Start: 2025-03-18

## 2025-03-27 RX ORDER — PREDNISONE 5 MG/1
5 TABLET ORAL DAILY
COMMUNITY
Start: 2025-02-25 | End: 2025-04-01

## 2025-03-27 RX ORDER — AMITRIPTYLINE HYDROCHLORIDE 50 MG/1
50 TABLET ORAL AT BEDTIME
COMMUNITY
Start: 2025-02-25

## 2025-04-01 ENCOUNTER — OFFICE VISIT (OUTPATIENT)
Dept: FAMILY MEDICINE | Facility: CLINIC | Age: 80
End: 2025-04-01
Payer: COMMERCIAL

## 2025-04-01 VITALS
WEIGHT: 188.9 LBS | BODY MASS INDEX: 29.65 KG/M2 | TEMPERATURE: 98.2 F | SYSTOLIC BLOOD PRESSURE: 128 MMHG | RESPIRATION RATE: 20 BRPM | HEART RATE: 84 BPM | HEIGHT: 67 IN | DIASTOLIC BLOOD PRESSURE: 68 MMHG

## 2025-04-01 DIAGNOSIS — Z79.899 HIGH RISK MEDICATION USE: ICD-10-CM

## 2025-04-01 DIAGNOSIS — R19.7 DIARRHEA, UNSPECIFIED TYPE: Primary | ICD-10-CM

## 2025-04-01 DIAGNOSIS — L93.2 CUTANEOUS LUPUS ERYTHEMATOSUS: ICD-10-CM

## 2025-04-01 DIAGNOSIS — M35.01 SJOGREN'S SYNDROME WITH KERATOCONJUNCTIVITIS SICCA: ICD-10-CM

## 2025-04-01 DIAGNOSIS — M35.00 SLE-SJOGREN OVERLAP SYNDROME (H): ICD-10-CM

## 2025-04-01 DIAGNOSIS — M32.9 SLE-SJOGREN OVERLAP SYNDROME (H): ICD-10-CM

## 2025-04-01 DIAGNOSIS — M35.00 SJOGREN'S SYNDROME, WITH UNSPECIFIED ORGAN INVOLVEMENT: ICD-10-CM

## 2025-04-01 PROBLEM — D12.4 BENIGN NEOPLASM OF DESCENDING COLON: Status: ACTIVE | Noted: 2025-03-19

## 2025-04-01 PROBLEM — K63.5 POLYP OF COLON: Status: ACTIVE | Noted: 2025-03-17

## 2025-04-01 PROBLEM — R18.8 ASCITES: Status: ACTIVE | Noted: 2024-11-20

## 2025-04-01 LAB
ALBUMIN UR-MCNC: NEGATIVE MG/DL
APPEARANCE UR: ABNORMAL
BACTERIA #/AREA URNS HPF: ABNORMAL /HPF
BASOPHILS # BLD AUTO: 0 10E3/UL (ref 0–0.2)
BASOPHILS NFR BLD AUTO: 0 %
BILIRUB UR QL STRIP: NEGATIVE
COLOR UR AUTO: YELLOW
EOSINOPHIL # BLD AUTO: 0 10E3/UL (ref 0–0.7)
EOSINOPHIL NFR BLD AUTO: 0 %
ERYTHROCYTE [DISTWIDTH] IN BLOOD BY AUTOMATED COUNT: 13.5 % (ref 10–15)
ERYTHROCYTE [SEDIMENTATION RATE] IN BLOOD BY WESTERGREN METHOD: 29 MM/HR (ref 0–30)
GLUCOSE UR STRIP-MCNC: NEGATIVE MG/DL
HCT VFR BLD AUTO: 38.5 % (ref 35–47)
HGB BLD-MCNC: 13.1 G/DL (ref 11.7–15.7)
HGB UR QL STRIP: NEGATIVE
IMM GRANULOCYTES # BLD: 0 10E3/UL
IMM GRANULOCYTES NFR BLD: 0 %
KETONES UR STRIP-MCNC: NEGATIVE MG/DL
LEUKOCYTE ESTERASE UR QL STRIP: ABNORMAL
LYMPHOCYTES # BLD AUTO: 0.4 10E3/UL (ref 0.8–5.3)
LYMPHOCYTES NFR BLD AUTO: 5 %
MCH RBC QN AUTO: 32.3 PG (ref 26.5–33)
MCHC RBC AUTO-ENTMCNC: 34 G/DL (ref 31.5–36.5)
MCV RBC AUTO: 95 FL (ref 78–100)
MONOCYTES # BLD AUTO: 0.4 10E3/UL (ref 0–1.3)
MONOCYTES NFR BLD AUTO: 5 %
MUCOUS THREADS #/AREA URNS LPF: PRESENT /LPF
NEUTROPHILS # BLD AUTO: 7.3 10E3/UL (ref 1.6–8.3)
NEUTROPHILS NFR BLD AUTO: 89 %
NITRATE UR QL: NEGATIVE
PH UR STRIP: 6.5 [PH] (ref 5–8)
PLATELET # BLD AUTO: 132 10E3/UL (ref 150–450)
RBC # BLD AUTO: 4.06 10E6/UL (ref 3.8–5.2)
RBC #/AREA URNS AUTO: ABNORMAL /HPF
RETICS # AUTO: 0.06 10E6/UL (ref 0.03–0.1)
RETICS/RBC NFR AUTO: 1.4 % (ref 0.5–2)
SP GR UR STRIP: 1.01 (ref 1–1.03)
SQUAMOUS #/AREA URNS AUTO: ABNORMAL /LPF
UROBILINOGEN UR STRIP-ACNC: 0.2 E.U./DL
WBC # BLD AUTO: 8.2 10E3/UL (ref 4–11)
WBC #/AREA URNS AUTO: ABNORMAL /HPF

## 2025-04-01 PROCEDURE — 85652 RBC SED RATE AUTOMATED: CPT | Performed by: FAMILY MEDICINE

## 2025-04-01 PROCEDURE — 84450 TRANSFERASE (AST) (SGOT): CPT | Performed by: FAMILY MEDICINE

## 2025-04-01 PROCEDURE — 86140 C-REACTIVE PROTEIN: CPT | Performed by: FAMILY MEDICINE

## 2025-04-01 PROCEDURE — 86160 COMPLEMENT ANTIGEN: CPT | Performed by: FAMILY MEDICINE

## 2025-04-01 PROCEDURE — 82040 ASSAY OF SERUM ALBUMIN: CPT | Performed by: FAMILY MEDICINE

## 2025-04-01 PROCEDURE — 3074F SYST BP LT 130 MM HG: CPT | Performed by: FAMILY MEDICINE

## 2025-04-01 PROCEDURE — 1126F AMNT PAIN NOTED NONE PRSNT: CPT | Performed by: FAMILY MEDICINE

## 2025-04-01 PROCEDURE — 3078F DIAST BP <80 MM HG: CPT | Performed by: FAMILY MEDICINE

## 2025-04-01 PROCEDURE — 99214 OFFICE O/P EST MOD 30 MIN: CPT | Performed by: FAMILY MEDICINE

## 2025-04-01 PROCEDURE — 84156 ASSAY OF PROTEIN URINE: CPT | Performed by: FAMILY MEDICINE

## 2025-04-01 PROCEDURE — 85045 AUTOMATED RETICULOCYTE COUNT: CPT | Performed by: FAMILY MEDICINE

## 2025-04-01 PROCEDURE — 80048 BASIC METABOLIC PNL TOTAL CA: CPT | Performed by: FAMILY MEDICINE

## 2025-04-01 PROCEDURE — 84460 ALANINE AMINO (ALT) (SGPT): CPT | Performed by: FAMILY MEDICINE

## 2025-04-01 PROCEDURE — 99207 BLOOD MORPHOLOGY PATHOLOGIST REVIEW: CPT | Performed by: PATHOLOGY

## 2025-04-01 PROCEDURE — 81001 URINALYSIS AUTO W/SCOPE: CPT | Performed by: FAMILY MEDICINE

## 2025-04-01 PROCEDURE — 84443 ASSAY THYROID STIM HORMONE: CPT | Performed by: FAMILY MEDICINE

## 2025-04-01 PROCEDURE — 86225 DNA ANTIBODY NATIVE: CPT | Performed by: FAMILY MEDICINE

## 2025-04-01 PROCEDURE — 36415 COLL VENOUS BLD VENIPUNCTURE: CPT | Performed by: FAMILY MEDICINE

## 2025-04-01 PROCEDURE — 85025 COMPLETE CBC W/AUTO DIFF WBC: CPT | Performed by: FAMILY MEDICINE

## 2025-04-01 RX ORDER — PILOCARPINE HYDROCHLORIDE 5 MG/1
5 TABLET, FILM COATED ORAL EVERY 12 HOURS
COMMUNITY
Start: 2024-11-20

## 2025-04-01 ASSESSMENT — PAIN SCALES - GENERAL: PAINLEVEL_OUTOF10: NO PAIN (0)

## 2025-04-01 NOTE — PROGRESS NOTES
"Carolina MUNOZ Ricky  /68   Pulse 84   Temp 98.2  F (36.8  C) (Oral)   Resp 20   Ht 1.702 m (5' 7\")   Wt 85.7 kg (188 lb 14.4 oz)   BMI 29.59 kg/m       Assessment/Plan:                Adelina was seen today for recheck medication.    Diagnoses and all orders for this visit:    Diarrhea, unspecified type  -     C. difficile Toxin B PCR with reflex to C. difficile EIA; Future  -     Enteric Bacteria and Virus Panel by KIM Stool; Future  -     TSH with free T4 reflex; Future  -     Basic metabolic panel; Future  -     TSH with free T4 reflex  -     Basic metabolic panel  -     C. difficile Toxin B PCR with reflex to C. difficile EIA  -     Enteric Bacteria and Virus Panel by KIM Stool    SLE-Sjogren overlap syndrome (H)  -     Albumin level  -     Cancel: CBC with platelets    Sjogren's syndrome with keratoconjunctivitis sicca  -     Albumin level    High risk medication use  -     Albumin level    Cutaneous lupus erythematosus  -     Albumin level    Sjogren's syndrome, with unspecified organ involvement  -     ALT  -     AST  -     Cancel: Creatinine  -     Cancel: CBC with platelets  -     Erythrocyte sedimentation rate auto  -     CRP inflammation  -     DNA double stranded antibodies  -     Complement C3  -     Complement C4  -     Cancel: CBC with Platelets & Differential  -     Cancel: Erythrocyte sedimentation rate auto  -     Cancel: CRP inflammation  -     Lab Blood Morphology Pathologist Review  -     Complement C4  -     UA with Microscopic reflex to Culture  -     Cancel: UA with Microscopic reflex to Culture  -     Protein  random urine         DISCUSSION  Rule out infectious cause for diarrhea consider other etiologies.  Consider medication to control symptoms after evaluating for infection.  Involve gastroenterology as appropriate.  Check additional labs as noted including those as needed for her rheumatologist above.  No clear-cut cause for pain or near right ear, continue to monitor.  "     Subjective:     HPI:    Carolina García is a 80 year old female has a medical history that includes Sjogren's syndrome, lupus, liver cirrhosis, DVT, history of gastrointestinal bleeding follows with gastroenterology, dermatology and rheumatology.    She is here today primarily concerned about ongoing diarrhea for 5 to 6 weeks.  Patient reports watery diarrhea 3-4 episodes daily for about 6 weeks.  No known inciting factor.  Did have colonoscopy after the onset of her symptoms were random biopsies were obtained.  No findings to suggest a cause, more specifically no signs of any type of colitis.  Patient reports no recent antibiotic use.  She has not had any recent medication changes that coincide with onset of symptoms.  Today we discussed ruling out infection, discussing potentially utilizing medication to help slow or control symptoms and then getting her into see her  gastroenterologist.  We also discussed obtaining laboratory testing today.  She is due for testing from her rheumatologist as well.    She want to talk about weight loss medications with current concerns elected to defer but talked about this briefly.    She has pain near the angle of her right jaw wondered about cause.    ROS:  Complete review of systems is obtained.  Other than the specific considerations noted above complete review of systems is negative.          Objective:   Medications:  Current Outpatient Medications   Medication Sig Dispense Refill    amitriptyline (ELAVIL) 50 MG tablet Take 50 mg by mouth at bedtime.      amLODIPine (NORVASC) 5 MG tablet TAKE 1 TABLET BY MOUTH EVERY DAY 90 tablet 2    azaTHIOprine (IMURAN) 50 MG tablet Take 1 tablet by mouth daily at 2 pm.      baclofen (LIORESAL) 10 MG tablet TAKE 0.5-1 TABLETS (5-10 MG) BY MOUTH 2 TIMES DAILY AS NEEDED FOR MUSCLE SPASMS 180 tablet 1    budesonide-formoterol (SYMBICORT) 80-4.5 MCG/ACT Inhaler Inhale 2 puffs into the lungs 2 times daily. 10.2 g 4     CARBOXYMETHYLCELL/HYPROMELLOSE (GENTEAL GEL OPHT) Apply 2 drops to eye as needed.      cevimeline (EVOXAC) 30 MG capsule TAKE 1 CAPSULE (30 MG) BY MOUTH 3 TIMES DAILY. 270 capsule 1    citalopram (CELEXA) 20 MG tablet TAKE 1 & 1/2 TABLETS BY MOUTH ONCE A  tablet 1    diphenhydrAMINE-acetaminophen (TYLENOL PM)  MG tablet Take 1 tablet by mouth nightly as needed for sleep.      fluocinonide (LIDEX) 0.05 % external cream Apply topically 2 times daily.      furosemide (LASIX) 20 MG tablet TAKE 2 TABLETS (40 MG) BY MOUTH EVERY MORNING 180 tablet 1    hydroxychloroquine (PLAQUENIL) 200 MG tablet Take 1 tablet (200 mg) by mouth daily at 2 pm. 180 tablet 0    loratadine (CLARITIN) 10 mg tablet Take 10 mg by mouth daily.      losartan (COZAAR) 100 MG tablet TAKE 1 TABLET BY MOUTH EVERY DAY 90 tablet 2    OMEGA-3/DHA/EPA/FISH OIL (FISH OIL-OMEGA-3 FATTY ACIDS) 300-1,000 mg capsule Take 2 g by mouth daily.      omeprazole (PRILOSEC) 20 MG capsule [OMEPRAZOLE (PRILOSEC) 20 MG CAPSULE] Take 2 capsules (40 mg total) by mouth 2 (two) times a day before meals. 120 capsule 1    pilocarpine (SALAGEN) 5 MG tablet Take 5 mg by mouth every 12 hours.      polyvinyl alcohol (LIQUIFILM TEARS) 1.4 % ophthalmic solution Place 1 drop into both eyes as needed.      RESTASIS MULTIDOSE 0.05 % ophthalmic emulsion Place 1 drop into both eyes 2 times daily.      spironolactone (ALDACTONE) 25 MG tablet Take 50 mg by mouth daily.       Current Facility-Administered Medications   Medication Dose Route Frequency Provider Last Rate Last Admin    ceFAZolin (ANCEF) 2 g in sodium chloride 0.9 % 100 mL intermittent infusion  2 g Intravenous Q8H Leonel Arrington  mL/hr at 12/04/23 1052 2 g at 12/04/23 1052        Allergies:     Allergies   Allergen Reactions    Hydroxychloroquine Shortness Of Breath    Celecoxib GI Disturbance    Codeine Unknown     Emesis, tired    Egg White [Egg White (Egg Protein)] Unknown     Dumping syndrome         Social History     Socioeconomic History    Marital status:      Spouse name: Not on file    Number of children: 3    Years of education: Not on file    Highest education level: Not on file   Occupational History    Not on file   Tobacco Use    Smoking status: Never     Passive exposure: Past    Smokeless tobacco: Never   Vaping Use    Vaping status: Never Used   Substance and Sexual Activity    Alcohol use: Yes     Comment: Alcoholic Drinks/day: glass of wine on occasion    Drug use: No    Sexual activity: Yes     Partners: Male   Other Topics Concern    Not on file   Social History Narrative    Not on file     Social Drivers of Health     Financial Resource Strain: Low Risk  (10/4/2024)    Financial Resource Strain     Within the past 12 months, have you or your family members you live with been unable to get utilities (heat, electricity) when it was really needed?: No   Food Insecurity: Low Risk  (10/4/2024)    Food Insecurity     Within the past 12 months, did you worry that your food would run out before you got money to buy more?: No     Within the past 12 months, did the food you bought just not last and you didn t have money to get more?: No   Transportation Needs: Low Risk  (10/4/2024)    Transportation Needs     Within the past 12 months, has lack of transportation kept you from medical appointments, getting your medicines, non-medical meetings or appointments, work, or from getting things that you need?: No   Physical Activity: Unknown (10/4/2024)    Exercise Vital Sign     Days of Exercise per Week: 1 day     Minutes of Exercise per Session: Not on file   Stress: No Stress Concern Present (10/4/2024)    [employer] - Occupational Stress Questionnaire     Feeling of Stress : Only a little   Social Connections: Unknown (10/4/2024)    Social Connection and Isolation Panel [NHANES]     Frequency of Communication with Friends and Family: Not on file     Frequency of  Social Gatherings with Friends and Family: Once a week     Attends Spiritism Services: Not on file     Active Member of Clubs or Organizations: Not on file     Attends Club or Organization Meetings: Not on file     Marital Status: Not on file   Interpersonal Safety: Low Risk  (10/4/2024)    Interpersonal Safety     Do you feel physically and emotionally safe where you currently live?: Yes     Within the past 12 months, have you been hit, slapped, kicked or otherwise physically hurt by someone?: No     Within the past 12 months, have you been humiliated or emotionally abused in other ways by your partner or ex-partner?: No   Housing Stability: High Risk (10/4/2024)    Housing Stability     Do you have housing? : No     Are you worried about losing your housing?: No       Family History   Problem Relation Age of Onset    Breast Cancer Sister 52.00    Cancer Sister     Breast Cancer Sister 60.00        Most Recent Immunizations   Administered Date(s) Administered    COVID-19 12+ (Pfizer) 10/04/2024    COVID-19 Bivalent 18+ (Moderna) 04/03/2023    COVID-19 MONOVALENT 12+ (Pfizer) 09/25/2021    COVID-19 Monovalent 12+ (Pfizer 2022) 04/30/2022    DT (PEDS <7y) 01/01/1990    HepA, Unspecified 06/01/2011    HepB, Unspecified 10/02/2014    Hepatitis A (VAQTA)(ADULT 19+) 06/01/2011    Hepatitis B, Adult (Energix-B/Recombivax HB) 01/12/2015    Pneumo Conj 13-V (2010&after) 08/16/2017    Pneumococcal 23 valent 04/13/2012    TD,PF 7+ (Tenivac) 11/15/2021    TDAP (Adacel,Boostrix) 04/29/2010    Td,adult,historic,unspecified 04/29/2010    Tdap (Adult) Unspecified Formulation 04/29/2010    Zoster recombinant adjuvanted (Shingrix) 01/29/2025        Wt Readings from Last 3 Encounters:   04/01/25 85.7 kg (188 lb 14.4 oz)   02/17/25 89.8 kg (197 lb 14.4 oz)   01/14/25 81.2 kg (179 lb)        BP Readings from Last 6 Encounters:   04/01/25 128/68   02/17/25 120/60   01/14/25 122/78   10/17/24 128/77   10/04/24 (!) 156/90   07/03/24 (!)  "150/64        Hemoglobin A1C   Date Value Ref Range Status   10/03/2023 5.4 0.0 - 5.6 % Final     Comment:     Normal <5.7%   Prediabetes 5.7-6.4%    Diabetes 6.5% or higher     Note: Adopted from ADA consensus guidelines.              PHYSICAL EXAM:    /68   Pulse 84   Temp 98.2  F (36.8  C) (Oral)   Resp 20   Ht 1.702 m (5' 7\")   Wt 85.7 kg (188 lb 14.4 oz)   BMI 29.59 kg/m           General Appearance:    Alert, cooperative, no distress   Eyes:   No scleral icterus or conjunctival irritation       Ears:    Normal TM's and external ear canals, both ears   Throat:   Lips, mucosa, and tongue normal; teeth and gums normal   Neck:   Supple, symmetrical, trachea midline, no adenopathy;        thyroid:  No enlargement/tenderness/nodules   Lungs:     Clear to auscultation bilaterally, respirations unlabored, no wheezes or crackles   Heart:    Regular rate and rhythm,  No murmur   Extremities:  No edema, no joint swelling or redness, no evidence of any injuries   Skin:  No concerning skin findings, no suspicious moles, no rashes   Neurologic:  On gross examination there is no motor or sensory deficit.  Patient walks with a normal gait                                   Answers submitted by the patient for this visit:  General Questionnaire (Submitted on 4/1/2025)  Chief Complaint: Chronic problems general questions HPI Form  What is the reason for your visit today? : follow up  How many servings of fruits and vegetables do you eat daily?: 0-1  On average, how many sweetened beverages do you drink each day (Examples: soda, juice, sweet tea, etc.  Do NOT count diet or artificially sweetened beverages)?: 0  How many minutes a day do you exercise enough to make your heart beat faster?: 10 to 19  How many days a week do you exercise enough to make your heart beat faster?: 3 or less  How many days per week do you miss taking your medication?: 0  Questionnaire about: Chronic problems general questions HPI Form " (Submitted on 4/1/2025)  Chief Complaint: Chronic problems general questions HPI Form

## 2025-04-02 LAB
ALBUMIN MFR UR ELPH: <6 MG/DL
ALBUMIN SERPL BCG-MCNC: 4.1 G/DL (ref 3.5–5.2)
ALT SERPL W P-5'-P-CCNC: 50 U/L (ref 0–50)
ANION GAP SERPL CALCULATED.3IONS-SCNC: 12 MMOL/L (ref 7–15)
AST SERPL W P-5'-P-CCNC: 56 U/L (ref 0–45)
BUN SERPL-MCNC: 10.3 MG/DL (ref 8–23)
C3 SERPL-MCNC: 61 MG/DL (ref 81–157)
C4 SERPL-MCNC: 17 MG/DL (ref 13–39)
CALCIUM SERPL-MCNC: 9 MG/DL (ref 8.8–10.4)
CHLORIDE SERPL-SCNC: 101 MMOL/L (ref 98–107)
CREAT SERPL-MCNC: 0.87 MG/DL (ref 0.51–0.95)
CREAT UR-MCNC: 32.5 MG/DL
CRP SERPL-MCNC: <3 MG/L
EGFRCR SERPLBLD CKD-EPI 2021: 68 ML/MIN/1.73M2
GLUCOSE SERPL-MCNC: 96 MG/DL (ref 70–99)
HCO3 SERPL-SCNC: 25 MMOL/L (ref 22–29)
PATH REPORT.COMMENTS IMP SPEC: NORMAL
PATH REPORT.COMMENTS IMP SPEC: NORMAL
PATH REPORT.FINAL DX SPEC: NORMAL
PATH REPORT.MICROSCOPIC SPEC OTHER STN: NORMAL
PATH REPORT.RELEVANT HX SPEC: NORMAL
POTASSIUM SERPL-SCNC: 3.8 MMOL/L (ref 3.4–5.3)
PROT/CREAT 24H UR: NORMAL MG/G{CREAT}
SODIUM SERPL-SCNC: 138 MMOL/L (ref 135–145)
TSH SERPL DL<=0.005 MIU/L-ACNC: 2.25 UIU/ML (ref 0.3–4.2)

## 2025-04-03 LAB — DSDNA AB SER-ACNC: 10 IU/ML

## 2025-04-09 ENCOUNTER — TRANSFERRED RECORDS (OUTPATIENT)
Dept: HEALTH INFORMATION MANAGEMENT | Facility: CLINIC | Age: 80
End: 2025-04-09
Payer: COMMERCIAL

## 2025-05-03 DIAGNOSIS — R60.9 EDEMA, UNSPECIFIED TYPE: ICD-10-CM

## 2025-05-03 DIAGNOSIS — K74.60 CIRRHOSIS OF LIVER WITHOUT ASCITES, UNSPECIFIED HEPATIC CIRRHOSIS TYPE (H): ICD-10-CM

## 2025-05-03 NOTE — PROGRESS NOTES
RETURN PATIENT RHEUMATOLOGY VISIT     Assessment & Plan     Problem List    HTN  Atrophic gastritic  Osteoarthritis       Systemic lupus erythematosus  Sjogren's Disease   Comment: THEE: 1:2560, speckled pattern, +SSA/+SSB, +RNP, hypocomplementemia, cutaneous lupus, vasculitic appearing rash, SICCA symptoms.   History of unprovoked DVT 3/2024, APS antibodies at the time were negative and neg again on check 10/2024    With recent lupus activity labs from April appear relatively stable with normalization of CRP and slight increase in dsDNA and stably low complements.  Today with vasculitic appearing rash over her lower extremities as well as her typical cutaneous lupus lesions over her left arm.  Recently seen by her dermatologist who obtained a biopsy from her lower extremity and prescribed a topical ointment.  Of note, normal renal function and no protein in her urine last urinalysis in April.      Plan:   -continue plaquenil 400mg daily   -continue cevimeline 30mg 1-3x daily   -continue carboxymethycell drops for eyes   -is on Imuran and prednisone for autoimmune hepatitis   -disease activity labs every 3-4 months  - Will obtain biopsy results from her dermatologist, Dr. Perla as well as latest note      Long Term Plaquenil   Comment: Reports she started Plaquenil only a few months ago  S/p eye exam at [place] [facility] 12/2024   Plan:   -yearly plaquenil eye exams    Autoimmune Hepatitis with Cirrhosis   Comment: Is followed by [facility] and is currently on imuran and prednisone 10mg daily   Plan:   -Continue to follow with [facility]  -recently went from     Mild restriction on PFTs  Comment:  Previous PFTs showed mild restriction, normal CT chest,is being followed by pulmonology and felt to be likely related to ascites and cirrhosis with recommendation for yearly PFTs.  Plan:  - Yearly PFTs with pulm    Osteoarthritis  Comment: Signs of osteoarthritis over her hands, knees, shoulders  Plan:  - Discussed option of  occasional therapy for her hands today, she declined  - Discussed option of intra-articular steroid injection for Dexter node with hand Ortho, reports her pain is only mild at this time and will hold off  - Left shoulder steroid injection today 5/5/2025  - Recommended physical therapy for shoulder and knees, currently not interested  - Recommend she follow-up with her orthopedic surgeon for her bilateral knee pain status post bilateral knee replacements      Orders Placed This Encounter   Procedures    DRAIN/INJECT LARGE JOINT/BURSA        30 minutes spent on the day of the encounter doing chart review, history and exam, counseling and documentation, not including time of procedure.     The longitudinal plan of care for the diagnosis(es)/condition(s) as documented were addressed during this visit not including time for procedure. Due to the added complexity in care, I will continue to support Adelina in the subsequent management and with ongoing continuity of care.    RTC in 3 months     Subjective     Reports her left shoulder has been hurting her more recently and she inquires about possibility of a steroid injection which has helped in the past.  Her last steroid injection to the shoulder was over 6 months ago.  She also reports some decreased flexion of one of her fingers due to an increasing Dexter's node.  Has mild pain over the area.  She has been having more pain over the lateral aspect of her knees without swelling more significant with ambulation.      In regards to her lupus, her most recent disease activity labs showing stable results.  Her purpuric rash over her chest has resolved.  Her most recent flare has involved her left arm.  She suspects her rash will worsen over the summer as it usually does.  Over the last few months she has also developed a petechial rash over her bilateral legs with some sensitivity over the area, without swelling.  She recently saw her dermatologist who obtained a biopsy.   She reports the results came back reassuring and she was prescribed topical ointment for the rash.  She continues on 10 mg of prednisone through GI for her autoimmune hepatitis and continues on Imuran.      HPI    Patient presents to establish care for Sjogren's/Lupus overlap.  Her diagnosis is based on positive THEE: 1:2560, speckled pattern, +SSA/+SSB, +RNP, hypocomplementemia, and cutaneous lupus. Was previously seen by Saranya Law last seen 3/2024.  She was also seen by rheumatology at [facility] last seen also 3/2024.  Patient is a poor historian but per review her last note from [place], it appears that when she was seen in March she had been having worsening rash for the last year and was not on any medications for her lupus.  Reportedly she had tried Plaquenil 5 years prior but discontinued it after a week due to shortness of breath.  It appears that her recurrent rash has been managed with intermittent steroids per dermatology with improvement, and that biopsy confirmed SCLE.  However I do not have these records at this time.  She had also been noted to have elevated liver enzymes and underwent liver biopsy in June of 2021 that demonstrated mild steatosis and hepatocyte ballooning without features of autoimmune hepatitis or other chronic liver disease. Stage IV fibrosis was noted. Anti smooth muscle antibody and antimitochondrial antibody have been negative.Hepatitis testing has also been negative.  She currently follows with [facility] and is on Imuran and prednisone 20 mg daily for presumed autoimmune hepatitis given improvement of her transaminitis with steroids.    Per my discussion with Ms. Ricky today, she reports she was diagnosed with lupus initially over 10 years ago after developing skin rash after prolonged sun exposure during a trip to [place].     She denies any history of oral ulcers, does get arthralgias over her hands, and she continues to have photosensitivity with flares of rash after sun  exposure.  She shows me a picture on her phone from 2023 which reveals erythematous plaques with scale over her arms and chest consistent with SCLE.   No raynaud's, no oral or nasal ulcers, no hx of inflammatory eye disease, no +GERD on omeprazole, +dyspnea - had a recent stress test due to this which was normal, occasional cough, no chest pain, no fevers or weigh loss, no pleurisy   Has a history of a blood clot in her leg 2024 s/p 3 months of blood thinner, no history of miscarriages, 3 kids   +dry eyes +dry mouth, no numbness or tingling  No salivary gland swelling    No family  Hx of known autoimmune disease  Not a previous smoker. Not a current smoker.      Lab and Imaging review:    I reviewed recent labs and imaging includin/1/25 normal creatinine, ALT, AST mildly elevated 56, normal CRP (previously 24) and ESR, CBC with leukopenia to 132 and lymphopenia to 0.4  Urinalysis without protein or blood  C3 low at 61 (stable), normal C4, dsDNA very mildly high at 10    2024 normal creatinine, CBC with low platelets at 137  10/2024 no significant proteinuria,  C4 normalized, C3 low at 62 but improved from prior, dsDNA within normal limits, normal ESR and CRP  Negative cardiolipin antibody, beta-2 glycoprotein, lupus anticoagulant,    2024 [facility] labs through care everywhere positive THEE 1: 2560, SSA over 8, SSB over 8, RNP over 8, negative SCL 70, Sharee 1, dsDNA, Lim,    3/2024 negative smooth muscle antibody, negative mitochondrial antibody, normal beta-2 glycoprotein, cardiolipin antibodies, and lupus anticoagulant      Current Outpatient Medications:     amitriptyline (ELAVIL) 50 MG tablet, Take 50 mg by mouth at bedtime., Disp: , Rfl:     amLODIPine (NORVASC) 5 MG tablet, TAKE 1 TABLET BY MOUTH EVERY DAY, Disp: 90 tablet, Rfl: 2    azaTHIOprine (IMURAN) 50 MG tablet, Take 1 tablet by mouth daily at 2 pm., Disp: , Rfl:     baclofen (LIORESAL) 10 MG tablet, TAKE 0.5-1 TABLETS  (5-10 MG) BY MOUTH 2 TIMES DAILY AS NEEDED FOR MUSCLE SPASMS, Disp: 180 tablet, Rfl: 1    budesonide-formoterol (SYMBICORT) 80-4.5 MCG/ACT Inhaler, Inhale 2 puffs into the lungs 2 times daily., Disp: 10.2 g, Rfl: 4    CARBOXYMETHYLCELL/HYPROMELLOSE (GENTEAL GEL OPHT), Apply 2 drops to eye as needed., Disp: , Rfl:     cevimeline (EVOXAC) 30 MG capsule, TAKE 1 CAPSULE (30 MG) BY MOUTH 3 TIMES DAILY., Disp: 270 capsule, Rfl: 1    citalopram (CELEXA) 20 MG tablet, TAKE 1 & 1/2 TABLETS BY MOUTH ONCE A DAY, Disp: 135 tablet, Rfl: 1    diphenhydrAMINE-acetaminophen (TYLENOL PM)  MG tablet, Take 1 tablet by mouth nightly as needed for sleep., Disp: , Rfl:     fluocinonide (LIDEX) 0.05 % external cream, Apply topically 2 times daily., Disp: , Rfl:     furosemide (LASIX) 20 MG tablet, TAKE 2 TABLETS (40 MG) BY MOUTH EVERY MORNING, Disp: 180 tablet, Rfl: 2    hydroxychloroquine (PLAQUENIL) 200 MG tablet, Take 1 tablet (200 mg) by mouth daily at 2 pm., Disp: 180 tablet, Rfl: 0    loratadine (CLARITIN) 10 mg tablet, Take 10 mg by mouth daily., Disp: , Rfl:     losartan (COZAAR) 100 MG tablet, TAKE 1 TABLET BY MOUTH EVERY DAY, Disp: 90 tablet, Rfl: 2    OMEGA-3/DHA/EPA/FISH OIL (FISH OIL-OMEGA-3 FATTY ACIDS) 300-1,000 mg capsule, Take 2 g by mouth daily., Disp: , Rfl:     omeprazole (PRILOSEC) 20 MG capsule, [OMEPRAZOLE (PRILOSEC) 20 MG CAPSULE] Take 2 capsules (40 mg total) by mouth 2 (two) times a day before meals., Disp: 120 capsule, Rfl: 1    polyvinyl alcohol (LIQUIFILM TEARS) 1.4 % ophthalmic solution, Place 1 drop into both eyes as needed., Disp: , Rfl:     RESTASIS MULTIDOSE 0.05 % ophthalmic emulsion, Place 1 drop into both eyes 2 times daily., Disp: , Rfl:     spironolactone (ALDACTONE) 25 MG tablet, Take 50 mg by mouth daily., Disp: , Rfl:     Current Facility-Administered Medications:     ceFAZolin (ANCEF) 2 g in sodium chloride 0.9 % 100 mL intermittent infusion, 2 g, Intravenous, Q8H, Leonel Arrington DO,  Last Rate: 200 mL/hr at 12/04/23 1052, 2 g at 12/04/23 1052    lidocaine (PF) (XYLOCAINE) 1 % injection 3 mL, 3 mL, INTRA-ARTICULAR, Once,     methylPREDNISolone (DEPO-Medrol) injection 60 mg, 60 mg, Intramuscular, Once,   Allergies:  Allergies   Allergen Reactions    Hydroxychloroquine Shortness Of Breath    Celecoxib GI Disturbance    Codeine Unknown     Emesis, tired    Egg White [Egg White (Egg Protein)] Unknown     Dumping syndrome     Medical Hx:  Past Medical History:   Diagnosis Date    GERD (gastroesophageal reflux disease)     Hypertension     Neck pain     Osteoarthritis 1/13/2016    Systemic lupus (H)      Surgical Hx:  Past Surgical History:   Procedure Laterality Date    ARTHROPLASTY KNEE BILATERAL      BIOPSY BREAST Right 2005    benign    CV CORONARY ANGIOGRAM N/A 8/22/2019    Procedure: Coronary Angiogram;  Surgeon: Melissa Vasquez MD;  Location: [facility];  Service: Cardiology    CV LEFT HEART CATHETERIZATION WITHOUT LEFT VENTRICULOGRAM Left 8/22/2019    Procedure: Left Heart Catheterization Without Left Ventriculogram;  Surgeon: Melissa Vasquez MD;  Location: [facility];  Service: Cardiology    FISSURECTOMY RECTUM      HC REMOVAL GALLBLADDER      Description: Cholecystectomy;  Recorded: 04/16/2012;    HYSTERECTOMY  1986    IN TOT DISC ARTHRP ART DISC ANT APPRO 1 NTRSPC CRV N/A 6/17/2015    Procedure:  C45 MOBI-C DISC ARTHORPLASTY, ATTEMPT MOBI C C56, ANTERIOR CERVICAL DISCECTOMY AND FUSION IF UNABLE TO PLACE ;  Surgeon: Korina Beltrán MD;  Location: [facility];  Service: Spine    ZZC GASTROPLASTY,OBESITY,OTHER      Description: Gastric Surgery For Morbid Obesity Gastric Stapling;  Recorded: 04/16/2012;     Family Hx:  Family History   Problem Relation Age of Onset    Breast Cancer Sister 52.00    Cancer Sister     Breast Cancer Sister 60.00     Social Hx:  Social History     Tobacco Use    Smoking status: Never     Passive exposure: Past    Smokeless tobacco: Never    Vaping Use    Vaping status: Never Used   Substance Use Topics    Alcohol use: Yes     Comment: Alcoholic Drinks/day: glass of wine on occasion    Drug use: No        Procedure Name: LEFT Shoulder Joint Injection  Indication: Pain  The procedure, alternative treatment options, risks, and benefits were thoroughly explained to the patient and informed consent was obtained.  The appropriate timeout was taken. I identified the humeral head and acromion to guide needle placement. The area was prepped in the usual sterile fashion and the overlying skin cleaned using povidone iodine solution and local anesthesia achieved using Ethyl Chloride spray (Cooling spray). Using a posterior approach, a 22 G needle was inserted 2-3cm inferior and medial to the posterolateral corner of the acromion and directed anteriorly towards the coracoid process. Gentle aspiration before injection didn t show any blood. The glenohumeral joint space was injected with 3ml of 1% lidocaine and 60mg of methylprednisolone solution.   Estimated blood loss was less than 0.5 cc.  A band aid was applied to the area. Anticipatory guidance, as well as standard post-procedure care, was explained. Return precautions were given. The patient tolerated the procedure well without complications.     Objective   Physical Exam   /70 (BP Location: Right arm)   Pulse 84   Wt 85.4 kg (188 lb 4.8 oz)   SpO2 97%   BMI 29.49 kg/m    General: alert, well appearing, no distress  HEENT:  clear conjunctiva, no oral or nasal ulcers, no cervical lymphadenopathy  Cardiac: Warm and well-perfused  Pulm: normal respiratory effort,   MSK: left shoulder with reduced internal rotation and abduction, no warmth or swelling, bilateral knees with replacements, hypertrophy, no synovitis.  Bilateral hands with Herberden's and Dexter's nodes  Skin: non-blanching purpuric papules/patches over left arm and some over right, no longer present over chest, non-blanching petechial  rash over LEs                                Denise Mchugh MD  Rheumatology

## 2025-05-05 ENCOUNTER — OFFICE VISIT (OUTPATIENT)
Dept: RHEUMATOLOGY | Facility: CLINIC | Age: 80
End: 2025-05-05
Payer: COMMERCIAL

## 2025-05-05 VITALS
WEIGHT: 188.3 LBS | SYSTOLIC BLOOD PRESSURE: 120 MMHG | BODY MASS INDEX: 29.49 KG/M2 | HEART RATE: 84 BPM | OXYGEN SATURATION: 97 % | DIASTOLIC BLOOD PRESSURE: 70 MMHG

## 2025-05-05 DIAGNOSIS — M15.0 PRIMARY OSTEOARTHRITIS INVOLVING MULTIPLE JOINTS: Primary | ICD-10-CM

## 2025-05-05 RX ORDER — LIDOCAINE HYDROCHLORIDE 10 MG/ML
3 INJECTION, SOLUTION EPIDURAL; INFILTRATION; INTRACAUDAL; PERINEURAL ONCE
Status: ACTIVE | OUTPATIENT
Start: 2025-05-05

## 2025-05-05 RX ORDER — FUROSEMIDE 20 MG/1
40 TABLET ORAL EVERY MORNING
Qty: 180 TABLET | Refills: 2 | Status: SHIPPED | OUTPATIENT
Start: 2025-05-05

## 2025-05-05 RX ORDER — METHYLPREDNISOLONE ACETATE 80 MG/ML
60 INJECTION, SUSPENSION INTRA-ARTICULAR; INTRALESIONAL; INTRAMUSCULAR; SOFT TISSUE ONCE
Status: ACTIVE | OUTPATIENT
Start: 2025-05-05

## 2025-05-08 ENCOUNTER — TRANSFERRED RECORDS (OUTPATIENT)
Facility: HOSPITAL | Age: 80
End: 2025-05-08

## 2025-05-13 ENCOUNTER — TRANSFERRED RECORDS (OUTPATIENT)
Dept: HEALTH INFORMATION MANAGEMENT | Facility: CLINIC | Age: 80
End: 2025-05-13
Payer: COMMERCIAL

## 2025-05-13 NOTE — PROGRESS NOTES
2025        Carolina García   [address] [place] [address]      Carolina García,  :  1945    No evidence of liver cancer on liver ultrasound. This is good news!    Repeat ultrasound in 6 months.    - Dr Yates  .      Thank you.    Electronically signed by:  Dany Yates MD 2025 04:09 PM  Document generated by:  Dany Yates MD  2025  If your provider ordered multiple tests; the results may not become available at the same time.  If multiple test results are received within 14 days of one another, you may receive a duplicate.  cc:  Rich Kelly MD

## 2025-05-15 ENCOUNTER — TRANSFERRED RECORDS (OUTPATIENT)
Facility: HOSPITAL | Age: 80
End: 2025-05-15

## 2025-05-15 DIAGNOSIS — M32.9 SLE-SJOGREN OVERLAP SYNDROME (H): ICD-10-CM

## 2025-05-15 DIAGNOSIS — M35.00 SLE-SJOGREN OVERLAP SYNDROME (H): ICD-10-CM

## 2025-05-15 RX ORDER — HYDROXYCHLOROQUINE SULFATE 200 MG/1
200 TABLET, FILM COATED ORAL
Qty: 90 TABLET | Refills: 1 | Status: SHIPPED | OUTPATIENT
Start: 2025-05-15

## 2025-05-16 NOTE — PROCEDURES
05/15/2025        Carolina García   [address] [place] [address] [place] [address]      Carolina García,  :  1945    MELD 3.0 score is 10. This is a good number. Range: 6-40 . Higher numbers indicate a sicker liver.    Your liver enzymes have gone up slightly.  We will continue to monitor them.    Please continue to follow the advice as discussed during your clinic visit.    - Dr Yates  .  Hep A Ab, Total 2025 14:56   Description Result Units Flags Range   Hep A Ab, Total Positive  A Negative   Comments   Performed At: [facility] [address], [place], [place], [id number]  Betina Jaime MD, Phone: [phone]   Hep A Ab, Total:  Comment: The HAV total antibody assay detects both IgG and IgM  but does not differentiate between them. A negative result  suggests susceptibility to infection. A positive result could  be due to vaccination, previously resolved infection or active  infection. Testing for HAV IgM should be performed if active  HAV infection is suspected. [employer] offers profiles that will  automatically reflex positive HAV total antibody results to  IgM (e.g., panel #[id number] HAV Antibody w/ Rfx).   Hep B Core Ab, Tot 2025 14:56   Description Result Units Flags Range   Hep B Core Ab, Tot Negative   Negative   Comments   Performed At: [facility] [address], [place], [place], [id number]  Betina Jaime MD, Phone: [phone]   HCV Antibody 2025 14:56   Description Result Units Flags Range   Hep C Virus Ab Non Reactive   Non Reactive   Comments   Performed At: [facility] [address], [place], [place], [id number]  Betina Jaime MD, Phone: [phone]   Hep C Virus Ab:  HCV antibody alone does not differentiate between previously  resolved infection and active infection. Equivocal and Reactive  HCV antibody results should be followed up with an HCV RNA test  to support the diagnosis of active HCV infection.   Hep B Surface Ab, Qual 2025 14:56   Description  Result Units Flags Range   Hep B Surface Ab, Qual Non Reactive      Comments   Performed At: [facility] [address], [place], [place], [id number]  Betina Jaime MD, Phone: [phone]   Hep B Surface Ab, Qual:                              Non Reactive: Not immune to HBV infection.                              Equivocal: Unable to determine if anti-HBs                                         is present at levels consistent                                         with immunity.                               Reactive: Anti-HBs concentration detected                                         at greater than 10 mIU/mL.                                         Individual is considered to be                                         immune to infection with HBV.   CBC, Platelet, No Differential 05/14/2025 14:56   Description Result Units Flags Range   Hematocrit 38.8 %  34.0-46.6   Hemoglobin 13.2 g/dL  11.1-15.9   MCH 33.2 pg H 26.6-33.0   MCHC 34.0 g/dL  31.5-35.7   MCV 98 fL H 79-97   Platelets 153 x10E3/uL  150-450   RBC 3.98 x10E6/uL  3.77-5.28   RDW 14.2 %  11.7-15.4   WBC 7.1 x10E3/uL  3.4-10.8   Comments   Performed At: [facility], [id number]  Betina Jaime MD, Phone: [phone]   Comp. Metabolic Panel (14) 05/14/2025 14:56   Description Result Units Flags Range   BUN 13 mg/dL  8-27   BUN/Creatinine Ratio 16   12-28   Carbon Dioxide, Total 22 mmol/L  20-29   Globulin, Total 3.0 g/dL  1.5-4.5   Bilirubin, Total 1.2 mg/dL  0.0-1.2   AST (SGOT) 72 IU/L H 0-40   ALT (SGPT) 74 IU/L H 0-32   Albumin 4.2 g/dL  3.8-4.8   Alkaline Phosphatase 114 IU/L     Calcium 8.4 mg/dL L 8.7-10.3   Chloride 96 mmol/L     Creatinine 0.83 mg/dL  0.57-1.00   eGFR 72 mL/min/1.73  >59   Glucose 93 mg/dL  70-99   Potassium 4.4 mmol/L  3.5-5.2   Protein, Total 7.2 g/dL  6.0-8.5   Sodium 134 mmol/L  134-144   Comments   Performed At: [facility]  [address] [place] [address],  [place], [id number]  Betina Jaime MD, Phone: [phone]     Prothrombin Time (PT) 05/14/2025 14:56   Description Result Units Flags Range   INR 1.0   0.9-1.2   Prothrombin Time 11.8 sec  9.1-12.0   Comments   Performed At: , [facility]  [address] [facility], [place], [place], [id number]  Betina Jaime MD, Phone: [phone]   INR:                 Reference interval is for non-anticoagulated patients.                                                                      .                 Suggested INR therapeutic range for Vitamin K                 antagonist therapy:                    Standard Dose (moderate intensity                                   therapeutic range):       2.0 - 3.0                    Higher intensity therapeutic range       2.5 - 3.5           Thank you.    Electronically signed by:  Dany Yates MD 05/15/2025 03:29 PM  Document generated by:  Dany Yates MD  05/15/2025  If your provider ordered multiple tests; the results may not become available at the same time.  If multiple test results are received within 14 days of one another, you may receive a duplicate.  cc:  Rich Kelly MD

## 2025-05-20 ENCOUNTER — TRANSFERRED RECORDS (OUTPATIENT)
Facility: HOSPITAL | Age: 80
End: 2025-05-20

## 2025-05-21 NOTE — PROCEDURES
2025        Carolina García   [address] [place] [address] Dr  [place] [address]      Carolina García,  :  1945    Immunoglobulin levels which are high in autoimmune hepatitis are within the normal range.  This is great news.    Please continue to follow the advice as discussed during your clinic visit.    - Dr Yates  .  Hep A Ab, Total 2025 14:56   Description Result Units Flags Range   Hep A Ab, Total Positive  A Negative   Comments   Performed At: [facility] [address], [place], [place], [id number]  Betina Jaime MD, Phone: [phone]   Hep A Ab, Total:  Comment: The HAV total antibody assay detects both IgG and IgM  but does not differentiate between them. A negative result  suggests susceptibility to infection. A positive result could  be due to vaccination, previously resolved infection or active  infection. Testing for HAV IgM should be performed if active  HAV infection is suspected. [facility] offers profiles that will  automatically reflex positive HAV total antibody results to  IgM (e.g., panel #[id number] HAV Antibody w/ Rfx).   Hep B Core Ab, Tot 2025 14:56   Description Result Units Flags Range   Hep B Core Ab, Tot Negative   Negative   Comments   Performed At: [facility]  [facility] [address] [place], [place], [id number]  Betina Jaime MD, Phone: [phone]   HCV Antibody 2025 14:56   Description Result Units Flags Range   Hep C Virus Ab Non Reactive   Non Reactive   Comments   Performed At: [facility]  [address] [place], [place], [id number]  Betina Jaime MD, Phone: [phone]   Hep C Virus Ab:  HCV antibody alone does not differentiate between previously  resolved infection and active infection. Equivocal and Reactive  HCV antibody results should be followed up with an HCV RNA test  to support the diagnosis of active HCV infection.   Hep B Surface Ab, Qual 2025 14:56   Description Result Units Flags Range   Hep B Surface Ab, Qual Non Reactive       Comments   Performed At: [facility] [address], [place], [place], [id number]  Betina Jaime MD, Phone: [phone]   Hep B Surface Ab, Qual:                              Non Reactive: Not immune to HBV infection.                              Equivocal: Unable to determine if anti-HBs                                         is present at levels consistent                                         with immunity.                               Reactive: Anti-HBs concentration detected                                         at greater than 10 mIU/mL.                                         Individual is considered to be                                         immune to infection with HBV.   CBC, Platelet, No Differential 05/14/2025 14:56   Description Result Units Flags Range   Hematocrit 38.8 %  34.0-46.6   Hemoglobin 13.2 g/dL  11.1-15.9   MCH 33.2 pg H 26.6-33.0   MCHC 34.0 g/dL  31.5-35.7   MCV 98 fL H 79-97   Platelets 153 x10E3/uL  150-450   RBC 3.98 x10E6/uL  3.77-5.28   RDW 14.2 %  11.7-15.4   WBC 7.1 x10E3/uL  3.4-10.8   Comments   Performed At: [facility] [address], [place], [place], [id number]  Betina Jaime MD, Phone: [phone]   Comp. Metabolic Panel (14) 05/14/2025 14:56   Description Result Units Flags Range   BUN 13 mg/dL  8-27   BUN/Creatinine Ratio 16   12-28   Carbon Dioxide, Total 22 mmol/L  20-29   Globulin, Total 3.0 g/dL  1.5-4.5   Bilirubin, Total 1.2 mg/dL  0.0-1.2   AST (SGOT) 72 IU/L H 0-40   ALT (SGPT) 74 IU/L H 0-32   Albumin 4.2 g/dL  3.8-4.8   Alkaline Phosphatase 114 IU/L     Calcium 8.4 mg/dL L 8.7-10.3   Chloride 96 mmol/L     Creatinine 0.83 mg/dL  0.57-1.00   eGFR 72 mL/min/1.73  >59   Glucose 93 mg/dL  70-99   Potassium 4.4 mmol/L  3.5-5.2   Protein, Total 7.2 g/dL  6.0-8.5   Sodium 134 mmol/L  134-144   Comments   Performed At: [facility]  [address] [facility], [place], [place], [id number]  Betina Jaime MD, Phone: [phone]      Immunoglobulins A/E/G/M, Serum 05/14/2025 14:56   Description Result Units Flags Range   Immunoglobulin A, Qn, Serum 401 mg/dL     Immunoglobulin E, Total 18 IU/mL  6-495   Immunoglobulin G, Qn, Serum 1586 mg/dL  586-1602   Immunoglobulin M, Qn, Serum 140 mg/dL     Comments   Performed At: [facility]  [employer] [place], [place], [id number]  Betina Jaime MD, Phone: [phone]  Performed At: [facility], [facility] [place]  [address], [place], [place], [id number]  Betina Jaime MD, Phone: [phone]     Prothrombin Time (PT) 05/14/2025 14:56   Description Result Units Flags Range   INR 1.0   0.9-1.2   Prothrombin Time 11.8 sec  9.1-12.0   Comments   Performed At: [facility]  [address] [place], [place], [id number]  Betina Jaime MD, Phone: [phone]   INR:                 Reference interval is for non-anticoagulated patients.                                                                      .                 Suggested INR therapeutic range for Vitamin K                 antagonist therapy:                    Standard Dose (moderate intensity                                   therapeutic range):       2.0 - 3.0                    Higher intensity therapeutic range       2.5 - 3.5           Thank you.    Electronically signed by:  Dany Yates MD 05/20/2025 04:13 PM  Document generated by:  Dany Yates MD  05/20/2025  If your provider ordered multiple tests; the results may not become available at the same time.  If multiple test results are received within 14 days of one another, you may receive a duplicate.  cc:  Rich Kelly MD

## 2025-05-27 RX ORDER — PREDNISONE 2.5 MG/1
2.5 TABLET ORAL DAILY
COMMUNITY
Start: 2025-05-14

## 2025-05-27 RX ORDER — PREDNISONE 5 MG/1
5 TABLET ORAL DAILY
COMMUNITY
Start: 2025-04-11

## 2025-05-27 RX ORDER — TRIAMCINOLONE ACETONIDE 1 MG/G
CREAM TOPICAL
COMMUNITY
Start: 2025-04-21

## 2025-06-03 ENCOUNTER — OFFICE VISIT (OUTPATIENT)
Dept: FAMILY MEDICINE | Facility: CLINIC | Age: 80
End: 2025-06-03
Payer: COMMERCIAL

## 2025-06-03 VITALS
HEART RATE: 90 BPM | RESPIRATION RATE: 20 BRPM | DIASTOLIC BLOOD PRESSURE: 72 MMHG | SYSTOLIC BLOOD PRESSURE: 122 MMHG | HEIGHT: 67 IN | TEMPERATURE: 97.7 F | WEIGHT: 186 LBS | BODY MASS INDEX: 29.19 KG/M2 | OXYGEN SATURATION: 97 %

## 2025-06-03 DIAGNOSIS — R19.7 DIARRHEA, UNSPECIFIED TYPE: ICD-10-CM

## 2025-06-03 DIAGNOSIS — K74.60 CIRRHOSIS OF LIVER WITHOUT ASCITES, UNSPECIFIED HEPATIC CIRRHOSIS TYPE (H): ICD-10-CM

## 2025-06-03 DIAGNOSIS — Z01.818 PREOP GENERAL PHYSICAL EXAM: Primary | ICD-10-CM

## 2025-06-03 DIAGNOSIS — D50.9 IRON DEFICIENCY ANEMIA, UNSPECIFIED IRON DEFICIENCY ANEMIA TYPE: ICD-10-CM

## 2025-06-03 DIAGNOSIS — M35.00 SLE-SJOGREN OVERLAP SYNDROME (H): ICD-10-CM

## 2025-06-03 DIAGNOSIS — H26.9 CATARACT OF RIGHT EYE, UNSPECIFIED CATARACT TYPE: ICD-10-CM

## 2025-06-03 DIAGNOSIS — Z86.718 PERSONAL HISTORY OF DVT (DEEP VEIN THROMBOSIS): ICD-10-CM

## 2025-06-03 DIAGNOSIS — L93.2 CUTANEOUS LUPUS ERYTHEMATOSUS: ICD-10-CM

## 2025-06-03 DIAGNOSIS — Z98.84 S/P GASTRIC BYPASS: ICD-10-CM

## 2025-06-03 DIAGNOSIS — I10 ESSENTIAL HYPERTENSION: ICD-10-CM

## 2025-06-03 DIAGNOSIS — M32.9 SLE-SJOGREN OVERLAP SYNDROME (H): ICD-10-CM

## 2025-06-03 PROCEDURE — 99214 OFFICE O/P EST MOD 30 MIN: CPT | Performed by: FAMILY MEDICINE

## 2025-06-03 PROCEDURE — 3078F DIAST BP <80 MM HG: CPT | Performed by: FAMILY MEDICINE

## 2025-06-03 PROCEDURE — 1126F AMNT PAIN NOTED NONE PRSNT: CPT | Performed by: FAMILY MEDICINE

## 2025-06-03 PROCEDURE — 3074F SYST BP LT 130 MM HG: CPT | Performed by: FAMILY MEDICINE

## 2025-06-03 RX ORDER — PREDNISOLONE ACETATE 10 MG/ML
1 SUSPENSION/ DROPS OPHTHALMIC EVERY 4 HOURS
COMMUNITY
Start: 2025-05-16

## 2025-06-03 RX ORDER — NEOMYCIN SULFATE, POLYMYXIN B SULFATE, AND DEXAMETHASONE 3.5; 10000; 1 MG/G; [USP'U]/G; MG/G
0.5 OINTMENT OPHTHALMIC 3 TIMES DAILY
COMMUNITY
Start: 2025-03-25 | End: 2025-06-03

## 2025-06-03 ASSESSMENT — PAIN SCALES - GENERAL: PAINLEVEL_OUTOF10: NO PAIN (0)

## 2025-06-03 NOTE — PROGRESS NOTES
Preoperative Evaluation  [facility]  [address]  [facility] [address]  Phone: [phone]  Fax: [phone]  Primary Provider: Rich Kelly MD  Pre-op Performing Provider: Rich Kelly MD  Parveen 3, 2025             6/3/2025   Surgical Information   What procedure is being done? pr op for Right Eye Cataract   Facility or Hospital where procedure/surgery will be performed: [facility]   Who is doing the procedure / surgery? [facility] [place]   Date of surgery / procedure: 6 12 25   Time of surgery / procedure: 8Am   Where do you plan to recover after surgery? at home with family     Fax number for surgical facility: [phone] - [facility] [phone]     Assessment & Plan     The proposed surgical procedure is considered LOW risk.    Adelina was seen today for recheck medication and pre-op exam.    Diagnoses and all orders for this visit:    Preop general physical exam    Cataract of right eye, unspecified cataract type    Cirrhosis of liver without ascites, unspecified hepatic cirrhosis type (H)    Diarrhea, unspecified type    SLE-Sjogren overlap syndrome (H)    Cutaneous lupus erythematosus    Essential hypertension    Iron deficiency anemia, unspecified iron deficiency anemia type    S/P gastric bypass    Personal history of DVT (deep vein thrombosis)        - No identified additional risk factors other than previously addressed    Antiplatelet or Anticoagulation Medication Instructions   - We reviewed the medication list and the patient is not on an antiplatelet or anticoagulation medications.    Additional Medication Instructions  Take all scheduled medications on the day of surgery    Recommendation  Approval given to proceed with proposed procedure, without further diagnostic evaluation.        Subjective   Adelina is a 80 year old, presenting for the following:  Recheck Medication and Pre-Op Exam    She is a symptomatic right eye  cataract and needs surgical treatment for improvement in vision.  She has a prior history of procedure on the left.    She has complicated medical history that includes Sjogren's syndrome, lupus, chronic liver disease with cirrhosis, history of DVT, gastrointestinal bleeding while on anticoagulation, no longer on anticoagulation.    She follows closely with specialty providers including gastroenterology, rheumatology and dermatology.    Today we reviewed the recent history of her chronic medical concerns noting that overall everything is under good control without any signs of decompensation or acute issues.  There are no signs or symptoms of an acute infection process.    She has not had any prior history of any concerns associated with any type of anesthesia.            6/3/2025   Pre-Op Questionnaire   Have you ever had a heart attack or stroke? No   Have you ever had surgery on your heart or blood vessels, such as a stent placement, a coronary artery bypass, or surgery on an artery in your head, neck, heart, or legs? No   Do you have chest pain with activity? No   Do you have a history of heart failure? No   Do you currently have a cold, bronchitis or symptoms of other infection? No   Do you have a cough, shortness of breath, or wheezing? No   Do you or anyone in your family have previous history of blood clots? (!) YES    Do you or does anyone in your family have a serious bleeding problem such as prolonged bleeding following surgeries or cuts? No   Have you ever had problems with anemia or been told to take iron pills? No   Have you had any abnormal blood loss such as black, tarry or bloody stools, or abnormal vaginal bleeding? No   Have you ever had a blood transfusion? No   Are you willing to have a blood transfusion if it is medically needed before, during, or after your surgery? Yes   Have you or any of your relatives ever had problems with anesthesia? No   Do you have sleep apnea, excessive snoring or  daytime drowsiness? No   Do you have any artifical heart valves or other implanted medical devices like a pacemaker, defibrillator, or continuous glucose monitor? No   Do you have artificial joints? (!) YES   Are you allergic to latex? No     Advance Care Planning        Preoperative Review of    reviewed - no record of controlled substances prescribed.          Patient Active Problem List    Diagnosis Date Noted    Benign neoplasm of descending colon 03/19/2025     Priority: Medium    Diarrhea 03/19/2025     Priority: Medium    Polyp of colon 03/17/2025     Priority: Medium    Ascites 11/20/2024     Priority: Medium    Pain of right lower leg 10/29/2024     Priority: Medium    Neuropathy of both feet 10/29/2024     Priority: Medium    Autoimmune hepatitis (H) 05/15/2024     Priority: Medium    Atrophic gastritis 05/03/2024     Priority: Medium    Polyp of duodenum 05/03/2024     Priority: Medium    Iron deficiency anemia 04/30/2024     Priority: Medium    Cirrhosis of liver (H) 09/26/2023     Priority: Medium    Cervical spine pain 07/14/2023     Priority: Medium    Myofascial pain 07/14/2023     Priority: Medium    Arthropathy of cervical facet joint 07/14/2023     Priority: Medium    H/O cervical spine surgery 07/14/2023     Priority: Medium    Nonalcoholic steatohepatitis 06/07/2021     Priority: Medium    Nausea and vomiting 04/29/2021     Priority: Medium    Abnormal liver function tests 04/29/2021     Priority: Medium    Gastritis 10/13/2020     Priority: Medium     Created by Conversion      Nonspecific abdominal pain 10/09/2020     Priority: Medium    Precordial chest pain      Priority: Medium    Abnormal nuclear stress test 08/15/2019     Priority: Medium     Added automatically from request for surgery [id number]        Localized primary carpometacarpal osteoarthritis 10/24/2018     Priority: Medium    Sjogren's syndrome 08/20/2018     Priority: Medium    Primary osteoarthritis involving multiple  joints 08/20/2018     Priority: Medium    THEE positive 08/20/2018     Priority: Medium    Anxiety      Priority: Medium     Created by Conversion  Replacement Utility updated for latest IMO load        Hypertension      Priority: Medium     Created by Conversion  Replacement Utility updated for latest IMO load        Rotator Cuff Tendonitis      Priority: Medium     Created by Conversion  Replacement Utility updated for latest IMO load        Allergic Rhinitis      Priority: Medium     Created by Conversion  Replacement Utility updated for latest IMO load        Menopause Has Occurred      Priority: Medium     Created by Conversion  Replacement Utility updated for latest IMO load        Hypocomplementemia (H) 01/13/2016     Priority: Medium    Subacute cutaneous lupus erythematosus 10/13/2015     Priority: Medium    Cervical radiculopathy at C5 06/17/2015     Priority: Medium    Myelopathy, spondylogenic, cervical 06/17/2015     Priority: Medium    Diverticular disease of colon 09/23/2014     Priority: Medium      Past Medical History:   Diagnosis Date    GERD (gastroesophageal reflux disease)     Hypertension     Neck pain     Osteoarthritis 1/13/2016    Systemic lupus (H)      Past Surgical History:   Procedure Laterality Date    ARTHROPLASTY KNEE BILATERAL      BIOPSY BREAST Right 2005    benign    CV CORONARY ANGIOGRAM N/A 8/22/2019    Procedure: Coronary Angiogram;  Surgeon: Melissa Vasquez MD;  Location: [facility];  Service: Cardiology    CV LEFT HEART CATHETERIZATION WITHOUT LEFT VENTRICULOGRAM Left 8/22/2019    Procedure: Left Heart Catheterization Without Left Ventriculogram;  Surgeon: Melissa Vasquez MD;  Location: [facility];  Service: Cardiology    FISSURECTOMY RECTUM      HC REMOVAL GALLBLADDER      Description: Cholecystectomy;  Recorded: 04/16/2012;    HYSTERECTOMY  1986    DC TOT DISC ARTHRP ART DISC ANT APPRO 1 NTRSPC CRV N/A 6/17/2015    Procedure:  C45 MOBI-C DISC  ARTHORPLASTY, ATTEMPT MOBI C C56, ANTERIOR CERVICAL DISCECTOMY AND FUSION IF UNABLE TO PLACE ;  Surgeon: Korina Beltrán MD;  Location: [facility];  Service: Spine    [facility] GASTROPLASTY,OBESITY,OTHER      Description: Gastric Surgery For Morbid Obesity Gastric Stapling;  Recorded: 04/16/2012;     Current Outpatient Medications   Medication Sig Dispense Refill    neomycin-polymyxin-dexAMETHasone (MAXITROL) 3.5-65971-3.1 ophthalmic ointment Place 0.5 inches into both eyes 3 times daily. (Patient not taking: Reported on 6/3/2025)      prednisoLONE acetate (PRED FORTE) 1 % ophthalmic suspension Place 1 drop into both eyes every 4 hours.      predniSONE (DELTASONE) 2.5 MG tablet Take 2.5 mg by mouth daily.      predniSONE (DELTASONE) 5 MG tablet Take 5 mg by mouth daily.      triamcinolone (KENALOG) 0.1 % external cream 1 APPLICATION DAILY TOPICALLY APPLY TO LEG AS NEEDED FOR RASH FOR TWO WEEKS, THEN REST ONE WEEK      amitriptyline (ELAVIL) 50 MG tablet Take 50 mg by mouth at bedtime.      amLODIPine (NORVASC) 5 MG tablet TAKE 1 TABLET BY MOUTH EVERY DAY 90 tablet 2    azaTHIOprine (IMURAN) 50 MG tablet Take 1 tablet by mouth daily at 2 pm.      baclofen (LIORESAL) 10 MG tablet TAKE 0.5-1 TABLETS (5-10 MG) BY MOUTH 2 TIMES DAILY AS NEEDED FOR MUSCLE SPASMS 180 tablet 1    budesonide-formoterol (SYMBICORT) 80-4.5 MCG/ACT Inhaler Inhale 2 puffs into the lungs 2 times daily. 10.2 g 4    CARBOXYMETHYLCELL/HYPROMELLOSE (GENTEAL GEL OPHT) Apply 2 drops to eye as needed.      cevimeline (EVOXAC) 30 MG capsule TAKE 1 CAPSULE (30 MG) BY MOUTH 3 TIMES DAILY. 270 capsule 1    citalopram (CELEXA) 20 MG tablet TAKE 1 & 1/2 TABLETS BY MOUTH ONCE A  tablet 1    diphenhydrAMINE-acetaminophen (TYLENOL PM)  MG tablet Take 1 tablet by mouth nightly as needed for sleep.      fluocinonide (LIDEX) 0.05 % external cream Apply topically 2 times daily.      furosemide (LASIX) 20 MG tablet TAKE 2 TABLETS (40 MG) BY MOUTH EVERY  "MORNING 180 tablet 2    hydroxychloroquine (PLAQUENIL) 200 MG tablet TAKE 1 TABLET (200 MG) BY MOUTH DAILY AT 2 PM. 90 tablet 1    loratadine (CLARITIN) 10 mg tablet Take 10 mg by mouth daily.      losartan (COZAAR) 100 MG tablet TAKE 1 TABLET BY MOUTH EVERY DAY 90 tablet 2    OMEGA-3/DHA/EPA/FISH OIL (FISH OIL-OMEGA-3 FATTY ACIDS) 300-1,000 mg capsule Take 2 g by mouth daily.      omeprazole (PRILOSEC) 20 MG capsule [OMEPRAZOLE (PRILOSEC) 20 MG CAPSULE] Take 2 capsules (40 mg total) by mouth 2 (two) times a day before meals. 120 capsule 1    polyvinyl alcohol (LIQUIFILM TEARS) 1.4 % ophthalmic solution Place 1 drop into both eyes as needed.      RESTASIS MULTIDOSE 0.05 % ophthalmic emulsion Place 1 drop into both eyes 2 times daily.      spironolactone (ALDACTONE) 25 MG tablet Take 50 mg by mouth daily.         Allergies   Allergen Reactions    Celecoxib GI Disturbance    Codeine Unknown     Emesis, tired    Egg White [Egg White (Egg Protein)] Unknown     Dumping syndrome        Social History     Tobacco Use    Smoking status: Never     Passive exposure: Past    Smokeless tobacco: Never   Substance Use Topics    Alcohol use: Yes     Comment: Alcoholic Drinks/day: glass of wine on occasion       History   Drug Use No           Complete review of systems is obtained.  Other than the specific considerations noted above complete review of systems is negative.      Objective    /72   Pulse 90   Temp 97.7  F (36.5  C)   Resp 20   Ht 1.702 m (5' 7\")   Wt 84.4 kg (186 lb)   SpO2 97%   BMI 29.13 kg/m     Estimated body mass index is 29.13 kg/m  as calculated from the following:    Height as of this encounter: 1.702 m (5' 7\").    Weight as of this encounter: 84.4 kg (186 lb).  Physical Exam        General Appearance:    Alert, cooperative, no distress   Eyes:   No scleral icterus or conjunctival irritation       Ears:    Normal TM's and external ear canals, both ears   Throat:   Lips, mucosa, and tongue normal; " removable partials on both the top and bottom.   Neck:   Supple, symmetrical, trachea midline, no adenopathy;        thyroid:  No enlargement/tenderness/nodules   Lungs:     Clear to auscultation bilaterally, respirations unlabored, no wheezes or crackles   Heart:    Regular rate and rhythm,  No murmur   Abdomen:    Soft, no distention, no tenderness on palpation, no masses, no organomegaly     Extremities:  No edema, no joint swelling or redness, no evidence of any injuries   Skin:  No concerning skin findings, no suspicious moles, no rashes   Neurologic:  On gross examination there is no motor or sensory deficit.  Patient walks with a normal gait       Recent Labs   Lab Test 05/14/25  1456 04/01/25  1229 03/11/25  0917   HGB  --  13.1 13.0   PLT  --  132* 151   INR 1.0  --  1.07   NA  --  138 141   POTASSIUM  --  3.8 3.6   CR  --  0.87 0.91        Diagnostics  No labs were ordered during this visit.   No EKG required for low risk surgery (cataract, skin procedure, breast biopsy, etc).    Recent laboratory testing obtained through [facility] on May 14, 2025 are reviewed.  Patient has normal hemoglobin.  Kidney function and potassium are normal.  No significant lab abnormalities to warrant any further testing or concern at this time.    Revised Cardiac Risk Index (RCRI)  The patient has the following serious cardiovascular risks for perioperative complications:   - No serious cardiac risks = 0 points     RCRI Interpretation: 0 points: Class I (very low risk - 0.4% complication rate)         Signed Electronically by: Rich Kelly MD  A copy of this evaluation report is provided to the requesting physician.

## 2025-06-16 ENCOUNTER — TRANSFERRED RECORDS (OUTPATIENT)
Dept: MULTI SPECIALTY CLINIC | Facility: CLINIC | Age: 80
End: 2025-06-16
Payer: COMMERCIAL

## 2025-06-16 LAB
ALT SERPL-CCNC: 44 IU/L (ref 0–32)
AST SERPL-CCNC: 46 IU/L (ref 0–40)
CREATININE (EXTERNAL): 0.94 MG/DL (ref 0.57–1)
GFR ESTIMATED (EXTERNAL): 62 ML/MIN/1.73
GLUCOSE (EXTERNAL): 111 MG/DL (ref 70–99)
POTASSIUM (EXTERNAL): 3.3 MMOL/L (ref 3.5–5.2)

## 2025-06-29 ASSESSMENT — SLEEP AND FATIGUE QUESTIONNAIRES
HOW LIKELY ARE YOU TO NOD OFF OR FALL ASLEEP WHEN YOU ARE A PASSENGER IN A CAR FOR AN HOUR WITHOUT A BREAK: WOULD NEVER DOZE
HOW LIKELY ARE YOU TO NOD OFF OR FALL ASLEEP WHILE SITTING AND TALKING TO SOMEONE: WOULD NEVER DOZE
HOW LIKELY ARE YOU TO NOD OFF OR FALL ASLEEP WHILE LYING DOWN TO REST IN THE AFTERNOON WHEN CIRCUMSTANCES PERMIT: MODERATE CHANCE OF DOZING
HOW LIKELY ARE YOU TO NOD OFF OR FALL ASLEEP WHILE SITTING INACTIVE IN A PUBLIC PLACE: WOULD NEVER DOZE
HOW LIKELY ARE YOU TO NOD OFF OR FALL ASLEEP WHILE SITTING QUIETLY AFTER LUNCH WITHOUT ALCOHOL: WOULD NEVER DOZE
HOW LIKELY ARE YOU TO NOD OFF OR FALL ASLEEP WHILE WATCHING TV: SLIGHT CHANCE OF DOZING
HOW LIKELY ARE YOU TO NOD OFF OR FALL ASLEEP WHILE SITTING AND READING: SLIGHT CHANCE OF DOZING
HOW LIKELY ARE YOU TO NOD OFF OR FALL ASLEEP IN A CAR, WHILE STOPPED FOR A FEW MINUTES IN TRAFFIC: WOULD NEVER DOZE

## 2025-06-30 ENCOUNTER — LAB (OUTPATIENT)
Dept: LAB | Facility: CLINIC | Age: 80
End: 2025-06-30
Payer: COMMERCIAL

## 2025-06-30 DIAGNOSIS — Z79.899 HIGH RISK MEDICATION USE: ICD-10-CM

## 2025-06-30 DIAGNOSIS — M35.00 SJOGREN'S SYNDROME, WITH UNSPECIFIED ORGAN INVOLVEMENT: ICD-10-CM

## 2025-06-30 DIAGNOSIS — M32.9 SLE-SJOGREN OVERLAP SYNDROME (H): ICD-10-CM

## 2025-06-30 DIAGNOSIS — M35.01 SJOGREN'S SYNDROME WITH KERATOCONJUNCTIVITIS SICCA: ICD-10-CM

## 2025-06-30 DIAGNOSIS — L93.2 CUTANEOUS LUPUS ERYTHEMATOSUS: ICD-10-CM

## 2025-06-30 DIAGNOSIS — M35.00 SLE-SJOGREN OVERLAP SYNDROME (H): ICD-10-CM

## 2025-06-30 DIAGNOSIS — K91.2 POSTOPERATIVE MALABSORPTION: ICD-10-CM

## 2025-06-30 LAB
ALBUMIN MFR UR ELPH: <6 MG/DL
ALBUMIN SERPL BCG-MCNC: 4 G/DL (ref 3.5–5.2)
ALBUMIN UR-MCNC: NEGATIVE MG/DL
ALT SERPL W P-5'-P-CCNC: 61 U/L (ref 0–50)
APPEARANCE UR: CLEAR
AST SERPL W P-5'-P-CCNC: 59 U/L (ref 0–45)
BACTERIA #/AREA URNS HPF: ABNORMAL /HPF
BILIRUB UR QL STRIP: NEGATIVE
COLOR UR AUTO: YELLOW
CREAT SERPL-MCNC: 0.92 MG/DL (ref 0.51–0.95)
CREAT UR-MCNC: 27.5 MG/DL
CRP SERPL-MCNC: <3 MG/L
EGFRCR SERPLBLD CKD-EPI 2021: 63 ML/MIN/1.73M2
ERYTHROCYTE [DISTWIDTH] IN BLOOD BY AUTOMATED COUNT: 13.6 % (ref 10–15)
ERYTHROCYTE [SEDIMENTATION RATE] IN BLOOD BY WESTERGREN METHOD: 33 MM/HR (ref 0–30)
GLUCOSE UR STRIP-MCNC: NEGATIVE MG/DL
HCT VFR BLD AUTO: 38.7 % (ref 35–47)
HGB BLD-MCNC: 13.2 G/DL (ref 11.7–15.7)
HGB UR QL STRIP: NEGATIVE
KETONES UR STRIP-MCNC: NEGATIVE MG/DL
LEUKOCYTE ESTERASE UR QL STRIP: ABNORMAL
MCH RBC QN AUTO: 32.9 PG (ref 26.5–33)
MCHC RBC AUTO-ENTMCNC: 34.1 G/DL (ref 31.5–36.5)
MCV RBC AUTO: 97 FL (ref 78–100)
NITRATE UR QL: NEGATIVE
PH UR STRIP: 6.5 [PH] (ref 5–8)
PLATELET # BLD AUTO: 146 10E3/UL (ref 150–450)
PROT/CREAT 24H UR: NORMAL MG/G{CREAT}
RBC # BLD AUTO: 4.01 10E6/UL (ref 3.8–5.2)
RBC #/AREA URNS AUTO: ABNORMAL /HPF
SP GR UR STRIP: <=1.005 (ref 1–1.03)
SQUAMOUS #/AREA URNS AUTO: ABNORMAL /LPF
UROBILINOGEN UR STRIP-ACNC: 0.2 E.U./DL
WBC # BLD AUTO: 6.8 10E3/UL (ref 4–11)
WBC #/AREA URNS AUTO: ABNORMAL /HPF

## 2025-06-30 PROCEDURE — 99000 SPECIMEN HANDLING OFFICE-LAB: CPT

## 2025-06-30 PROCEDURE — 85027 COMPLETE CBC AUTOMATED: CPT

## 2025-06-30 PROCEDURE — 36415 COLL VENOUS BLD VENIPUNCTURE: CPT

## 2025-06-30 PROCEDURE — 84460 ALANINE AMINO (ALT) (SGPT): CPT

## 2025-06-30 PROCEDURE — 86160 COMPLEMENT ANTIGEN: CPT

## 2025-06-30 PROCEDURE — 80053 COMPREHEN METABOLIC PANEL: CPT

## 2025-06-30 PROCEDURE — 86225 DNA ANTIBODY NATIVE: CPT

## 2025-06-30 PROCEDURE — 84590 ASSAY OF VITAMIN A: CPT | Mod: 90

## 2025-06-30 PROCEDURE — 84450 TRANSFERASE (AST) (SGOT): CPT

## 2025-06-30 PROCEDURE — 82565 ASSAY OF CREATININE: CPT

## 2025-06-30 PROCEDURE — 85652 RBC SED RATE AUTOMATED: CPT

## 2025-06-30 PROCEDURE — 84156 ASSAY OF PROTEIN URINE: CPT

## 2025-06-30 PROCEDURE — 81001 URINALYSIS AUTO W/SCOPE: CPT

## 2025-06-30 PROCEDURE — 86140 C-REACTIVE PROTEIN: CPT

## 2025-06-30 PROCEDURE — 82306 VITAMIN D 25 HYDROXY: CPT

## 2025-07-01 LAB
C3 SERPL-MCNC: 59 MG/DL (ref 81–157)
C4 SERPL-MCNC: 19 MG/DL (ref 13–39)
DSDNA AB SER-ACNC: 9 IU/ML

## 2025-07-02 ENCOUNTER — OFFICE VISIT (OUTPATIENT)
Dept: SURGERY | Facility: CLINIC | Age: 80
End: 2025-07-02
Payer: COMMERCIAL

## 2025-07-02 VITALS
WEIGHT: 187 LBS | BODY MASS INDEX: 29.35 KG/M2 | SYSTOLIC BLOOD PRESSURE: 130 MMHG | HEIGHT: 67 IN | DIASTOLIC BLOOD PRESSURE: 80 MMHG

## 2025-07-02 DIAGNOSIS — E66.3 OVERWEIGHT (BMI 25.0-29.9): ICD-10-CM

## 2025-07-02 DIAGNOSIS — K91.2 POSTOPERATIVE MALABSORPTION: Primary | ICD-10-CM

## 2025-07-02 DIAGNOSIS — K90.9 INTESTINAL MALABSORPTION, UNSPECIFIED TYPE: ICD-10-CM

## 2025-07-02 LAB
ALBUMIN SERPL BCG-MCNC: 4 G/DL (ref 3.5–5.2)
ALP SERPL-CCNC: 86 U/L (ref 40–150)
ALT SERPL W P-5'-P-CCNC: 61 U/L (ref 0–50)
ANION GAP SERPL CALCULATED.3IONS-SCNC: 16 MMOL/L (ref 7–15)
AST SERPL W P-5'-P-CCNC: 59 U/L (ref 0–45)
BILIRUB SERPL-MCNC: 0.8 MG/DL
BUN SERPL-MCNC: 10.3 MG/DL (ref 8–23)
CALCIUM SERPL-MCNC: 9.3 MG/DL (ref 8.8–10.4)
CHLORIDE SERPL-SCNC: 102 MMOL/L (ref 98–107)
CREAT SERPL-MCNC: 0.94 MG/DL (ref 0.51–0.95)
EGFRCR SERPLBLD CKD-EPI 2021: 62 ML/MIN/1.73M2
GLUCOSE SERPL-MCNC: 79 MG/DL (ref 70–99)
HCO3 SERPL-SCNC: 22 MMOL/L (ref 22–29)
POTASSIUM SERPL-SCNC: 3.8 MMOL/L (ref 3.4–5.3)
PROT SERPL-MCNC: 7.2 G/DL (ref 6.4–8.3)
SODIUM SERPL-SCNC: 140 MMOL/L (ref 135–145)
VIT D+METAB SERPL-MCNC: 32 NG/ML (ref 20–50)

## 2025-07-02 PROCEDURE — 3079F DIAST BP 80-89 MM HG: CPT | Performed by: FAMILY MEDICINE

## 2025-07-02 PROCEDURE — 3075F SYST BP GE 130 - 139MM HG: CPT | Performed by: FAMILY MEDICINE

## 2025-07-02 PROCEDURE — 99215 OFFICE O/P EST HI 40 MIN: CPT | Performed by: FAMILY MEDICINE

## 2025-07-02 PROCEDURE — 99417 PROLNG OP E/M EACH 15 MIN: CPT | Performed by: FAMILY MEDICINE

## 2025-07-02 RX ORDER — MOXIFLOXACIN 5 MG/ML
SOLUTION/ DROPS OPHTHALMIC
COMMUNITY
Start: 2025-06-24

## 2025-07-02 RX ORDER — AZATHIOPRINE 100 MG/1
1 TABLET ORAL DAILY
COMMUNITY
Start: 2025-06-15

## 2025-07-02 NOTE — PATIENT INSTRUCTIONS
[facility]    Remember to: Ask about weaning off of Amitriptyline  Change OJ to calorie free beverage  Silver Sneakers 2-3X wk and include strength training    -Eat 3 meals a day (not 2, not 5) Chew your food well/SLOW down  -Eat your protein first  -Be a water drinker/Minize liquid calories (no regular pop, no juice) skim or 1% milk OK  -Sleep 7-8 hours each night. Address sleep if problematic  -Stress management is important. Address if problematic  -Move-8000 steps daily Muscle: maintain your muscle mass (strength training 2X/wk)  -Wheat, not white (bread, pasta, crackers, doretha, bagels, tortillas, rice)  -Limit restaurant, cafeteria, take out, drive through to 2 times per week or less  -Minimize caffeine, alcohol, and night-time snacking  -Consider keeping a food diary (i.e. My Fitness Pal, Lose It, or other food tracker)  -Follow up with the dietitian      **Some lean proteins: chicken, turkey, tuna, salmon, crab, fish, shrimp, scallops, lobster, lean cuts of beef and pork, luncheon meats, veggie burgers, beans (black, lima, garbanzo, reid, kidney, refried), chile, cottage cheese, string cheese, other cheese, eggs, tofu, peanut butter, nuts, vegan crumbles, greek yogurt

## 2025-07-02 NOTE — PROGRESS NOTES
I, Carolina García, give verbal consent for a resident to be present in today's visit.     Bariatric Follow Up Visit with a History of Previous Bariatric Surgery     Date of visit: 7/2/2025  Physician: Maria De Jesus Taveras MD, MD  Primary Care Provider:  Rich Kelly  Carolina García   80 year old  female    Date of Surgery: 1980's  Initial Weight: 282#  Initial BMI: 44.16  Today's Weight:   Wt Readings from Last 1 Encounters:   07/02/25 84.8 kg (187 lb)     Body mass index is 29.29 kg/m .      Assessment and Plan     Assessment: Carolina is a 80 year old year old female who is almost 45 years s/p  Merna en Y Gastric Bypass with a bariatric surgeon in [facility]   Carolina García feels as if she had achieved the goals she hoped to accomplish through bariatric surgery and weight loss.    Encounter Diagnoses   Name Primary?    Postoperative malabsorption Yes    Intestinal malabsorption, unspecified type     Overweight (BMI 25.0-29.9)          Current Outpatient Medications:     amitriptyline (ELAVIL) 50 MG tablet, Take 50 mg by mouth at bedtime., Disp: , Rfl:     amLODIPine (NORVASC) 5 MG tablet, TAKE 1 TABLET BY MOUTH EVERY DAY, Disp: 90 tablet, Rfl: 2    azaTHIOprine (IMURAN) 50 MG tablet, Take 1 tablet by mouth daily at 2 pm., Disp: , Rfl:     azaTHIOprine 100 MG TABS, Take 1 tablet by mouth daily., Disp: , Rfl:     baclofen (LIORESAL) 10 MG tablet, TAKE 0.5-1 TABLETS (5-10 MG) BY MOUTH 2 TIMES DAILY AS NEEDED FOR MUSCLE SPASMS, Disp: 180 tablet, Rfl: 1    CARBOXYMETHYLCELL/HYPROMELLOSE (GENTEAL GEL OPHT), Apply 2 drops to eye as needed., Disp: , Rfl:     cevimeline (EVOXAC) 30 MG capsule, TAKE 1 CAPSULE (30 MG) BY MOUTH 3 TIMES DAILY., Disp: 270 capsule, Rfl: 1    citalopram (CELEXA) 20 MG tablet, TAKE 1 & 1/2 TABLETS BY MOUTH ONCE A DAY, Disp: 135 tablet, Rfl: 1    diphenhydrAMINE-acetaminophen (TYLENOL PM)  MG tablet, Take 1 tablet by mouth nightly as needed for sleep., Disp: , Rfl:      fluocinonide (LIDEX) 0.05 % external cream, Apply topically 2 times daily., Disp: , Rfl:     furosemide (LASIX) 20 MG tablet, TAKE 2 TABLETS (40 MG) BY MOUTH EVERY MORNING, Disp: 180 tablet, Rfl: 2    hydroxychloroquine (PLAQUENIL) 200 MG tablet, TAKE 1 TABLET (200 MG) BY MOUTH DAILY AT 2 PM., Disp: 90 tablet, Rfl: 1    loratadine (CLARITIN) 10 mg tablet, Take 10 mg by mouth daily., Disp: , Rfl:     losartan (COZAAR) 100 MG tablet, TAKE 1 TABLET BY MOUTH EVERY DAY, Disp: 90 tablet, Rfl: 2    moxifloxacin (VIGAMOX) 0.5 % ophthalmic solution, APPLY 1 DROP INTO RIGHT EYE 4 TIMES A DAY FOR 3 DAYS THEN STOP, Disp: , Rfl:     OMEGA-3/DHA/EPA/FISH OIL (FISH OIL-OMEGA-3 FATTY ACIDS) 300-1,000 mg capsule, Take 2 g by mouth daily., Disp: , Rfl:     omeprazole (PRILOSEC) 20 MG capsule, [OMEPRAZOLE (PRILOSEC) 20 MG CAPSULE] Take 2 capsules (40 mg total) by mouth 2 (two) times a day before meals., Disp: 120 capsule, Rfl: 1    polyvinyl alcohol (LIQUIFILM TEARS) 1.4 % ophthalmic solution, Place 1 drop into both eyes as needed., Disp: , Rfl:     prednisoLONE acetate (PRED FORTE) 1 % ophthalmic suspension, Place 1 drop into both eyes every 4 hours., Disp: , Rfl:     predniSONE (DELTASONE) 2.5 MG tablet, Take 2.5 mg by mouth daily., Disp: , Rfl:     predniSONE (DELTASONE) 5 MG tablet, Take 5 mg by mouth daily., Disp: , Rfl:     spironolactone (ALDACTONE) 25 MG tablet, Take 50 mg by mouth daily., Disp: , Rfl:     triamcinolone (KENALOG) 0.1 % external cream, 1 APPLICATION DAILY TOPICALLY APPLY TO LEG AS NEEDED FOR RASH FOR TWO WEEKS, THEN REST ONE WEEK, Disp: , Rfl:     Current Facility-Administered Medications:     ceFAZolin (ANCEF) 2 g in sodium chloride 0.9 % 100 mL intermittent infusion, 2 g, Intravenous, Q8H, Leonel Arrington DO, Last Rate: 200 mL/hr at 12/04/23 1052, 2 g at 12/04/23 1052    lidocaine (PF) (XYLOCAINE) 1 % injection 3 mL, 3 mL, INTRA-ARTICULAR, Once,     methylPREDNISolone (DEPO-Medrol) injection 60 mg, 60  "mg, Intramuscular, Once,      Plan: Remainder of annual labs for vitamin recommendations. Available labs OK, LFTs mildly elevated. RD for MNT. Await new lab results. Discontinue orange juice as fructose load is too high. \"Eat fruit,  not fruit juice.\" Continue utilizing [employer]ne[employer] benefits to optimize metabolism.    RD prn then me    Bariatric Surgery Review     Interim History/LifeChanges: Initial weight 282# Lost over 100# to 148#. Gained weight with predisone. Diagnosed with LUPUS, Sjogrens, autoimmune hepatitis. Has Lupus and has been on and of of prednisine for the past couple of years and has gained weight. Not taking vitamins currently.    Patient Concerns: weight gain  Appetite (1-10): increased  GERD: on PPI BID    Medication changes: prednisone weaning down    Vitamin Intake:   B-12   no   MVI  no   Vitamin D  no   Calcium   no     Other                LABS: ordered    Nausea no  Vomiting no  Constipation no  Diarrhea yes  Rashes yes Lupus  Hair Loss no  Calf tenderness no  Breathing difficulty no  Reactive Hypoglycemia yes  Light Headedness yes   Moods euthymic    12 point ROS as above and otherwise negative      Habits:  Alcohol: no  Tobacco: no  Caffeine coffee and marleny tea  NSAIDS yes  Exercise Routine: has [employer] walking track. Still drives  3 meals/day yes  Protein first sometimes  ?grams/day  Water Separate from meals no  Calorie Containing Beverages OJ  Restaurant eating/wk 1/wk  Sleeping nocturia 5-6  Stress low  CPAP: no  Contraception: PM  DEXA: LBM 2021 followed by Dr. Kelly    Social History     Social History     Socioeconomic History    Marital status:      Spouse name: Not on file    Number of children: 3    Years of education: Not on file    Highest education level: Not on file   Occupational History    Not on file   Tobacco Use    Smoking status: Never     Passive exposure: Past    Smokeless tobacco: Never   Vaping Use    Vaping status: Never Used   Substance and " Sexual Activity    Alcohol use: Not Currently     Comment: Alcoholic Drinks/day: glass of wine on occasion    Drug use: No    Sexual activity: Yes     Partners: Male   Other Topics Concern    Not on file   Social History Narrative     2 children Son  of MI 49 yo. Lives alone in a town house one level. One dog. Retired. Worked at [employer] for 40 years.      Social Drivers of Health     Financial Resource Strain: Low Risk  (10/4/2024)    Financial Resource Strain     Within the past 12 months, have you or your family members you live with been unable to get utilities (heat, electricity) when it was really needed?: No   Food Insecurity: Low Risk  (10/4/2024)    Food Insecurity     Within the past 12 months, did you worry that your food would run out before you got money to buy more?: No     Within the past 12 months, did the food you bought just not last and you didn t have money to get more?: No   Transportation Needs: Low Risk  (10/4/2024)    Transportation Needs     Within the past 12 months, has lack of transportation kept you from medical appointments, getting your medicines, non-medical meetings or appointments, work, or from getting things that you need?: No   Physical Activity: Unknown (10/4/2024)    Exercise Vital Sign     Days of Exercise per Week: 1 day     Minutes of Exercise per Session: Not on file   Stress: No Stress Concern Present (10/4/2024)    [employer] - Occupational Stress Questionnaire     Feeling of Stress : Only a little   Social Connections: Unknown (10/4/2024)    Social Connection and Isolation Panel [NHANES]     Frequency of Communication with Friends and Family: Not on file     Frequency of Social Gatherings with Friends and Family: Once a week     Attends Confucianist Services: Not on file     Active Member of Clubs or Organizations: Not on file     Attends Club or Organization Meetings: Not on file     Marital Status: Not on file   Interpersonal  Safety: Low Risk  (10/4/2024)    Interpersonal Safety     Do you feel physically and emotionally safe where you currently live?: Yes     Within the past 12 months, have you been hit, slapped, kicked or otherwise physically hurt by someone?: No     Within the past 12 months, have you been humiliated or emotionally abused in other ways by your partner or ex-partner?: No   Housing Stability: High Risk (10/4/2024)    Housing Stability     Do you have housing? : No     Are you worried about losing your housing?: No       Past Medical History     Past Medical History:   Diagnosis Date    GERD (gastroesophageal reflux disease)     Hepatic cirrhosis (H)     Hypertension     Low bone mass     Neck pain     Osteoarthritis 01/13/2016    Postoperative malabsorption     Systemic lupus (H)      Problem List     Patient Active Problem List   Diagnosis    Anxiety    Hypertension    Rotator Cuff Tendonitis    Gastritis    Allergic Rhinitis    Menopause Has Occurred    Cervical radiculopathy at C5    Myelopathy, spondylogenic, cervical    Subacute cutaneous lupus erythematosus    Hypocomplementemia (H)    Sjogren's syndrome    Primary osteoarthritis involving multiple joints    THEE positive    Localized primary carpometacarpal osteoarthritis    Abnormal nuclear stress test    Precordial chest pain    Nonspecific abdominal pain    Nausea and vomiting    Diverticular disease of colon    Abnormal liver function tests    Cervical spine pain    Myofascial pain    Arthropathy of cervical facet joint    H/O cervical spine surgery    Cirrhosis of liver (H)    Nonalcoholic steatohepatitis    Atrophic gastritis    Autoimmune hepatitis (H)    Iron deficiency anemia    Polyp of duodenum    Pain of right lower leg    Neuropathy of both feet    Ascites    Benign neoplasm of descending colon    Diarrhea    Polyp of colon    Postoperative malabsorption    Hepatic cirrhosis (H)     Medications       Current Outpatient Medications:     amitriptyline  (ELAVIL) 50 MG tablet, Take 50 mg by mouth at bedtime., Disp: , Rfl:     amLODIPine (NORVASC) 5 MG tablet, TAKE 1 TABLET BY MOUTH EVERY DAY, Disp: 90 tablet, Rfl: 2    azaTHIOprine (IMURAN) 50 MG tablet, Take 1 tablet by mouth daily at 2 pm., Disp: , Rfl:     azaTHIOprine 100 MG TABS, Take 1 tablet by mouth daily., Disp: , Rfl:     baclofen (LIORESAL) 10 MG tablet, TAKE 0.5-1 TABLETS (5-10 MG) BY MOUTH 2 TIMES DAILY AS NEEDED FOR MUSCLE SPASMS, Disp: 180 tablet, Rfl: 1    CARBOXYMETHYLCELL/HYPROMELLOSE (GENTEAL GEL OPHT), Apply 2 drops to eye as needed., Disp: , Rfl:     cevimeline (EVOXAC) 30 MG capsule, TAKE 1 CAPSULE (30 MG) BY MOUTH 3 TIMES DAILY., Disp: 270 capsule, Rfl: 1    citalopram (CELEXA) 20 MG tablet, TAKE 1 & 1/2 TABLETS BY MOUTH ONCE A DAY, Disp: 135 tablet, Rfl: 1    diphenhydrAMINE-acetaminophen (TYLENOL PM)  MG tablet, Take 1 tablet by mouth nightly as needed for sleep., Disp: , Rfl:     fluocinonide (LIDEX) 0.05 % external cream, Apply topically 2 times daily., Disp: , Rfl:     furosemide (LASIX) 20 MG tablet, TAKE 2 TABLETS (40 MG) BY MOUTH EVERY MORNING, Disp: 180 tablet, Rfl: 2    hydroxychloroquine (PLAQUENIL) 200 MG tablet, TAKE 1 TABLET (200 MG) BY MOUTH DAILY AT 2 PM., Disp: 90 tablet, Rfl: 1    loratadine (CLARITIN) 10 mg tablet, Take 10 mg by mouth daily., Disp: , Rfl:     losartan (COZAAR) 100 MG tablet, TAKE 1 TABLET BY MOUTH EVERY DAY, Disp: 90 tablet, Rfl: 2    moxifloxacin (VIGAMOX) 0.5 % ophthalmic solution, APPLY 1 DROP INTO RIGHT EYE 4 TIMES A DAY FOR 3 DAYS THEN STOP, Disp: , Rfl:     OMEGA-3/DHA/EPA/FISH OIL (FISH OIL-OMEGA-3 FATTY ACIDS) 300-1,000 mg capsule, Take 2 g by mouth daily., Disp: , Rfl:     omeprazole (PRILOSEC) 20 MG capsule, [OMEPRAZOLE (PRILOSEC) 20 MG CAPSULE] Take 2 capsules (40 mg total) by mouth 2 (two) times a day before meals., Disp: 120 capsule, Rfl: 1    polyvinyl alcohol (LIQUIFILM TEARS) 1.4 % ophthalmic solution, Place 1 drop into both eyes as  "needed., Disp: , Rfl:     prednisoLONE acetate (PRED FORTE) 1 % ophthalmic suspension, Place 1 drop into both eyes every 4 hours., Disp: , Rfl:     predniSONE (DELTASONE) 2.5 MG tablet, Take 2.5 mg by mouth daily., Disp: , Rfl:     predniSONE (DELTASONE) 5 MG tablet, Take 5 mg by mouth daily., Disp: , Rfl:     spironolactone (ALDACTONE) 25 MG tablet, Take 50 mg by mouth daily., Disp: , Rfl:     triamcinolone (KENALOG) 0.1 % external cream, 1 APPLICATION DAILY TOPICALLY APPLY TO LEG AS NEEDED FOR RASH FOR TWO WEEKS, THEN REST ONE WEEK, Disp: , Rfl:     Current Facility-Administered Medications:     ceFAZolin (ANCEF) 2 g in sodium chloride 0.9 % 100 mL intermittent infusion, 2 g, Intravenous, Q8H, Leonel Arrington DO, Last Rate: 200 mL/hr at 12/04/23 1052, 2 g at 12/04/23 1052    lidocaine (PF) (XYLOCAINE) 1 % injection 3 mL, 3 mL, INTRA-ARTICULAR, Once,     methylPREDNISolone (DEPO-Medrol) injection 60 mg, 60 mg, Intramuscular, Once,    Surgical History     Past Surgical History  She has a past surgical history that includes REMOVAL GALLBLADDER; GASTROPLASTY,OBESITY,OTHER; Hysterectomy (01/01/1986); Arthroplasty knee bilateral; Fissurectomy rectum; Pr Tot Disc Arthrp Art Disc Ant Appro [facility] Crv (N/A, 06/17/2015); Biopsy breast (Right, 01/01/2005); Cv Coronary Angiogram (N/A, 08/22/2019); and Cv Left Heart Catheterization Without Left Ventriculogram (Left, 08/22/2019).    Objective-Exam     Constitutional:  /80 (BP Location: Right arm, Patient Position: Sitting, Cuff Size: Adult Regular)   Ht 1.702 m (5' 7\")   Wt 84.8 kg (187 lb)   BMI 29.29 kg/m      General:  Pleasant and in no acute distress   Eyes:  EOMI  ENT:  Airway 2+  Moist mucous membranes  Neck:  Supple, 14.5\"  Respiratory: Normal respiratory effort, no cough, wheezes or crackles  CV:  Regular rate  Gastrointestinal: BS+  Musculoskeletal: muscle mass WNL  Skin: color pink hair full, incisions nicely healed  Neurological: No tremor, normal " unassisted gait  Psychiatric: alert and oriented X3, mood and affect normal    Counseling     We reviewed the important post op bariatric recommendations:  -eating 3 meals daily  -eating protein first, getting >60gm protein daily  -eating slowly, chewing food well  -avoiding/limiting calorie containing beverages  -drinking water 15-30 minutes before or after meals  -choosing wheat, not white with breads, crackers, pastas, doretha, bagels, tortillas, rice  -limiting restaurant or cafeteria eating to twice a week or less    We discussed the importance of restorative sleep and stress management in maintaining a healthy weight.  We discussed the National Weight Control Registry healthy weight maintenance strategies and ways to optimize metabolism.  We discussed the importance of physical activity including cardiovascular and strength training in maintaining a healthier weight.    We discussed the importance of life-long vitamin supplementation and life-long  follow-up.    Carolina was reminded that, to avoid marginal ulcers she should avoid tobacco at all, alcohol in excess, caffeine in excess, and NSAIDS (unless indicated for cardioprotection or othewise and opposed by a PPI).    Maria De Jesus Taveras MD, MD, FAAFP  [facility].  7/2/2025  3:10 PM            Total time spent on the date of this encounter doing: chart review, review of test results, patient visit, physical exam, education, counseling, developing plan of care, and documenting = 60 minutes.

## 2025-07-02 NOTE — LETTER
7/2/2025      Carolina García  [address] [place] [address] [facility] [id number]      Dear Colleague,    Thank you for referring your patient, Carolina García, to the [facility] [place]. Please see a copy of my visit note below.    I, Carolina García, give verbal consent for a resident to be present in today's visit.     Bariatric Follow Up Visit with a History of Previous Bariatric Surgery     Date of visit: 7/2/2025  Physician: Maria De Jesus Taveras MD, MD  Primary Care Provider:  Rich Kelly ALEXANDER García   80 year old  female    Date of Surgery: 1980's  Initial Weight: 282#  Initial BMI: 44.16  Today's Weight:   Wt Readings from Last 1 Encounters:   07/02/25 84.8 kg (187 lb)     Body mass index is 29.29 kg/m .      Assessment and Plan     Assessment: Carolina is a 80 year old year old female who is almost 45 years s/p  Merna en Y Gastric Bypass with a bariatric surgeon in [facility]   Carolina García feels as if she had achieved the goals she hoped to accomplish through bariatric surgery and weight loss.    Encounter Diagnoses   Name Primary?     Postoperative malabsorption Yes     Intestinal malabsorption, unspecified type      Overweight (BMI 25.0-29.9)          Current Outpatient Medications:      amitriptyline (ELAVIL) 50 MG tablet, Take 50 mg by mouth at bedtime., Disp: , Rfl:      amLODIPine (NORVASC) 5 MG tablet, TAKE 1 TABLET BY MOUTH EVERY DAY, Disp: 90 tablet, Rfl: 2     azaTHIOprine (IMURAN) 50 MG tablet, Take 1 tablet by mouth daily at 2 pm., Disp: , Rfl:      azaTHIOprine 100 MG TABS, Take 1 tablet by mouth daily., Disp: , Rfl:      baclofen (LIORESAL) 10 MG tablet, TAKE 0.5-1 TABLETS (5-10 MG) BY MOUTH 2 TIMES DAILY AS NEEDED FOR MUSCLE SPASMS, Disp: 180 tablet, Rfl: 1     CARBOXYMETHYLCELL/HYPROMELLOSE (GENTEAL GEL OPHT), Apply 2 drops to eye as needed., Disp: , Rfl:      cevimeline (EVOXAC) 30 MG capsule, TAKE 1 CAPSULE (30 MG) BY MOUTH 3 TIMES DAILY.,  Disp: 270 capsule, Rfl: 1     citalopram (CELEXA) 20 MG tablet, TAKE 1 & 1/2 TABLETS BY MOUTH ONCE A DAY, Disp: 135 tablet, Rfl: 1     diphenhydrAMINE-acetaminophen (TYLENOL PM)  MG tablet, Take 1 tablet by mouth nightly as needed for sleep., Disp: , Rfl:      fluocinonide (LIDEX) 0.05 % external cream, Apply topically 2 times daily., Disp: , Rfl:      furosemide (LASIX) 20 MG tablet, TAKE 2 TABLETS (40 MG) BY MOUTH EVERY MORNING, Disp: 180 tablet, Rfl: 2     hydroxychloroquine (PLAQUENIL) 200 MG tablet, TAKE 1 TABLET (200 MG) BY MOUTH DAILY AT 2 PM., Disp: 90 tablet, Rfl: 1     loratadine (CLARITIN) 10 mg tablet, Take 10 mg by mouth daily., Disp: , Rfl:      losartan (COZAAR) 100 MG tablet, TAKE 1 TABLET BY MOUTH EVERY DAY, Disp: 90 tablet, Rfl: 2     moxifloxacin (VIGAMOX) 0.5 % ophthalmic solution, APPLY 1 DROP INTO RIGHT EYE 4 TIMES A DAY FOR 3 DAYS THEN STOP, Disp: , Rfl:      OMEGA-3/DHA/EPA/FISH OIL (FISH OIL-OMEGA-3 FATTY ACIDS) 300-1,000 mg capsule, Take 2 g by mouth daily., Disp: , Rfl:      omeprazole (PRILOSEC) 20 MG capsule, [OMEPRAZOLE (PRILOSEC) 20 MG CAPSULE] Take 2 capsules (40 mg total) by mouth 2 (two) times a day before meals., Disp: 120 capsule, Rfl: 1     polyvinyl alcohol (LIQUIFILM TEARS) 1.4 % ophthalmic solution, Place 1 drop into both eyes as needed., Disp: , Rfl:      prednisoLONE acetate (PRED FORTE) 1 % ophthalmic suspension, Place 1 drop into both eyes every 4 hours., Disp: , Rfl:      predniSONE (DELTASONE) 2.5 MG tablet, Take 2.5 mg by mouth daily., Disp: , Rfl:      predniSONE (DELTASONE) 5 MG tablet, Take 5 mg by mouth daily., Disp: , Rfl:      spironolactone (ALDACTONE) 25 MG tablet, Take 50 mg by mouth daily., Disp: , Rfl:      triamcinolone (KENALOG) 0.1 % external cream, 1 APPLICATION DAILY TOPICALLY APPLY TO LEG AS NEEDED FOR RASH FOR TWO WEEKS, THEN REST ONE WEEK, Disp: , Rfl:     Current Facility-Administered Medications:      ceFAZolin (ANCEF) 2 g in sodium chloride  "0.9 % 100 mL intermittent infusion, 2 g, Intravenous, Q8H, Leonel Arrington DO, Last Rate: 200 mL/hr at 12/04/23 1052, 2 g at 12/04/23 1052     lidocaine (PF) (XYLOCAINE) 1 % injection 3 mL, 3 mL, INTRA-ARTICULAR, Once,      methylPREDNISolone (DEPO-Medrol) injection 60 mg, 60 mg, Intramuscular, Once,      Plan: Remainder of annual labs for vitamin recommendations. Available labs OK, LFTs mildly elevated. RD for MNT. Await new lab results. Discontinue orange juice as fructose load is too high. \"Eat fruit,  not fruit juice.\" Continue utilizing [employer] benefits to optimize metabolism.    RD prn then me    Bariatric Surgery Review     Interim History/LifeChanges: Initial weight 282# Lost over 100# to 148#. Gained weight with predisone. Diagnosed with LUPUS, Sjogrens, autoimmune hepatitis. Has Lupus and has been on and of of prednisine for the past couple of years and has gained weight. Not taking vitamins currently.    Patient Concerns: weight gain  Appetite (1-10): increased  GERD: on PPI BID    Medication changes: prednisone weaning down    Vitamin Intake:   B-12   no   MVI  no   Vitamin D  no   Calcium   no     Other                LABS: ordered    Nausea no  Vomiting no  Constipation no  Diarrhea yes  Rashes yes Lupus  Hair Loss no  Calf tenderness no  Breathing difficulty no  Reactive Hypoglycemia yes  Light Headedness yes   Moods euthymic    12 point ROS as above and otherwise negative      Habits:  Alcohol: no  Tobacco: no  Caffeine coffee and marleny tea  NSAIDS yes  Exercise Routine: has [employer] [place] walking track. Still drives  3 meals/day yes  Protein first sometimes  ?grams/day  Water Separate from meals no  Calorie Containing Beverages OJ  Restaurant eating/wk 1/wk  Sleeping nocturia 5-6  Stress low  CPAP: no  Contraception: PM  DEXA: LBM 2021 followed by Dr. Kelly    Social History     Social History     Socioeconomic History     Marital status:      Spouse name: Not on file     " Number of children: 3     Years of education: Not on file     Highest education level: Not on file   Occupational History     Not on file   Tobacco Use     Smoking status: Never     Passive exposure: Past     Smokeless tobacco: Never   Vaping Use     Vaping status: Never Used   Substance and Sexual Activity     Alcohol use: Not Currently     Comment: Alcoholic Drinks/day: glass of wine on occasion     Drug use: No     Sexual activity: Yes     Partners: Male   Other Topics Concern     Not on file   Social History Narrative     2 children Son  of MI 49 yo. Lives alone in a town house one level. One dog. Retired. Worked at [employer] for 40 years.      Social Drivers of Health     Financial Resource Strain: Low Risk  (10/4/2024)    Financial Resource Strain      Within the past 12 months, have you or your family members you live with been unable to get utilities (heat, electricity) when it was really needed?: No   Food Insecurity: Low Risk  (10/4/2024)    Food Insecurity      Within the past 12 months, did you worry that your food would run out before you got money to buy more?: No      Within the past 12 months, did the food you bought just not last and you didn t have money to get more?: No   Transportation Needs: Low Risk  (10/4/2024)    Transportation Needs      Within the past 12 months, has lack of transportation kept you from medical appointments, getting your medicines, non-medical meetings or appointments, work, or from getting things that you need?: No   Physical Activity: Unknown (10/4/2024)    Exercise Vital Sign      Days of Exercise per Week: 1 day      Minutes of Exercise per Session: Not on file   Stress: No Stress Concern Present (10/4/2024)    [place] [employer] - Occupational Stress Questionnaire      Feeling of Stress : Only a little   Social Connections: Unknown (10/4/2024)    Social Connection and Isolation Panel [NHANES]      Frequency of Communication with Friends  and Family: Not on file      Frequency of Social Gatherings with Friends and Family: Once a week      Attends Mandaen Services: Not on file      Active Member of Clubs or Organizations: Not on file      Attends Club or Organization Meetings: Not on file      Marital Status: Not on file   Interpersonal Safety: Low Risk  (10/4/2024)    Interpersonal Safety      Do you feel physically and emotionally safe where you currently live?: Yes      Within the past 12 months, have you been hit, slapped, kicked or otherwise physically hurt by someone?: No      Within the past 12 months, have you been humiliated or emotionally abused in other ways by your partner or ex-partner?: No   Housing Stability: High Risk (10/4/2024)    Housing Stability      Do you have housing? : No      Are you worried about losing your housing?: No       Past Medical History     Past Medical History:   Diagnosis Date     GERD (gastroesophageal reflux disease)      Hepatic cirrhosis (H)      Hypertension      Low bone mass      Neck pain      Osteoarthritis 01/13/2016     Postoperative malabsorption      Systemic lupus (H)      Problem List     Patient Active Problem List   Diagnosis     Anxiety     Hypertension     Rotator Cuff Tendonitis     Gastritis     Allergic Rhinitis     Menopause Has Occurred     Cervical radiculopathy at C5     Myelopathy, spondylogenic, cervical     Subacute cutaneous lupus erythematosus     Hypocomplementemia (H)     Sjogren's syndrome     Primary osteoarthritis involving multiple joints     THEE positive     Localized primary carpometacarpal osteoarthritis     Abnormal nuclear stress test     Precordial chest pain     Nonspecific abdominal pain     Nausea and vomiting     Diverticular disease of colon     Abnormal liver function tests     Cervical spine pain     Myofascial pain     Arthropathy of cervical facet joint     H/O cervical spine surgery     Cirrhosis of liver (H)     Nonalcoholic steatohepatitis     Atrophic  gastritis     Autoimmune hepatitis (H)     Iron deficiency anemia     Polyp of duodenum     Pain of right lower leg     Neuropathy of both feet     Ascites     Benign neoplasm of descending colon     Diarrhea     Polyp of colon     Postoperative malabsorption     Hepatic cirrhosis (H)     Medications       Current Outpatient Medications:      amitriptyline (ELAVIL) 50 MG tablet, Take 50 mg by mouth at bedtime., Disp: , Rfl:      amLODIPine (NORVASC) 5 MG tablet, TAKE 1 TABLET BY MOUTH EVERY DAY, Disp: 90 tablet, Rfl: 2     azaTHIOprine (IMURAN) 50 MG tablet, Take 1 tablet by mouth daily at 2 pm., Disp: , Rfl:      azaTHIOprine 100 MG TABS, Take 1 tablet by mouth daily., Disp: , Rfl:      baclofen (LIORESAL) 10 MG tablet, TAKE 0.5-1 TABLETS (5-10 MG) BY MOUTH 2 TIMES DAILY AS NEEDED FOR MUSCLE SPASMS, Disp: 180 tablet, Rfl: 1     CARBOXYMETHYLCELL/HYPROMELLOSE (GENTEAL GEL OPHT), Apply 2 drops to eye as needed., Disp: , Rfl:      cevimeline (EVOXAC) 30 MG capsule, TAKE 1 CAPSULE (30 MG) BY MOUTH 3 TIMES DAILY., Disp: 270 capsule, Rfl: 1     citalopram (CELEXA) 20 MG tablet, TAKE 1 & 1/2 TABLETS BY MOUTH ONCE A DAY, Disp: 135 tablet, Rfl: 1     diphenhydrAMINE-acetaminophen (TYLENOL PM)  MG tablet, Take 1 tablet by mouth nightly as needed for sleep., Disp: , Rfl:      fluocinonide (LIDEX) 0.05 % external cream, Apply topically 2 times daily., Disp: , Rfl:      furosemide (LASIX) 20 MG tablet, TAKE 2 TABLETS (40 MG) BY MOUTH EVERY MORNING, Disp: 180 tablet, Rfl: 2     hydroxychloroquine (PLAQUENIL) 200 MG tablet, TAKE 1 TABLET (200 MG) BY MOUTH DAILY AT 2 PM., Disp: 90 tablet, Rfl: 1     loratadine (CLARITIN) 10 mg tablet, Take 10 mg by mouth daily., Disp: , Rfl:      losartan (COZAAR) 100 MG tablet, TAKE 1 TABLET BY MOUTH EVERY DAY, Disp: 90 tablet, Rfl: 2     moxifloxacin (VIGAMOX) 0.5 % ophthalmic solution, APPLY 1 DROP INTO RIGHT EYE 4 TIMES A DAY FOR 3 DAYS THEN STOP, Disp: , Rfl:      OMEGA-3/DHA/EPA/FISH  "OIL (FISH OIL-OMEGA-3 FATTY ACIDS) 300-1,000 mg capsule, Take 2 g by mouth daily., Disp: , Rfl:      omeprazole (PRILOSEC) 20 MG capsule, [OMEPRAZOLE (PRILOSEC) 20 MG CAPSULE] Take 2 capsules (40 mg total) by mouth 2 (two) times a day before meals., Disp: 120 capsule, Rfl: 1     polyvinyl alcohol (LIQUIFILM TEARS) 1.4 % ophthalmic solution, Place 1 drop into both eyes as needed., Disp: , Rfl:      prednisoLONE acetate (PRED FORTE) 1 % ophthalmic suspension, Place 1 drop into both eyes every 4 hours., Disp: , Rfl:      predniSONE (DELTASONE) 2.5 MG tablet, Take 2.5 mg by mouth daily., Disp: , Rfl:      predniSONE (DELTASONE) 5 MG tablet, Take 5 mg by mouth daily., Disp: , Rfl:      spironolactone (ALDACTONE) 25 MG tablet, Take 50 mg by mouth daily., Disp: , Rfl:      triamcinolone (KENALOG) 0.1 % external cream, 1 APPLICATION DAILY TOPICALLY APPLY TO LEG AS NEEDED FOR RASH FOR TWO WEEKS, THEN REST ONE WEEK, Disp: , Rfl:     Current Facility-Administered Medications:      ceFAZolin (ANCEF) 2 g in sodium chloride 0.9 % 100 mL intermittent infusion, 2 g, Intravenous, Q8H, Leonel Arrington DO, Last Rate: 200 mL/hr at 12/04/23 1052, 2 g at 12/04/23 1052     lidocaine (PF) (XYLOCAINE) 1 % injection 3 mL, 3 mL, INTRA-ARTICULAR, Once,      methylPREDNISolone (DEPO-Medrol) injection 60 mg, 60 mg, Intramuscular, Once,    Surgical History     Past Surgical History  She has a past surgical history that includes REMOVAL GALLBLADDER; GASTROPLASTY,OBESITY,OTHER; Hysterectomy (01/01/1986); Arthroplasty knee bilateral; Fissurectomy rectum; Pr Tot Disc Arthrp Art Disc Ant Appro 1 [facility]v (N/A, 06/17/2015); Biopsy breast (Right, 01/01/2005); Cv Coronary Angiogram (N/A, 08/22/2019); and Cv Left Heart Catheterization Without Left Ventriculogram (Left, 08/22/2019).    Objective-Exam     Constitutional:  /80 (BP Location: Right arm, Patient Position: Sitting, Cuff Size: Adult Regular)   Ht 1.702 m (5' 7\")   Wt 84.8 kg (187 " "lb)   BMI 29.29 kg/m      General:  Pleasant and in no acute distress   Eyes:  EOMI  ENT:  Airway 2+  Moist mucous membranes  Neck:  Supple, 14.5\"  Respiratory: Normal respiratory effort, no cough, wheezes or crackles  CV:  Regular rate  Gastrointestinal: BS+  Musculoskeletal: muscle mass WNL  Skin: color pink hair full, incisions nicely healed  Neurological: No tremor, normal unassisted gait  Psychiatric: alert and oriented X3, mood and affect normal    Counseling     We reviewed the important post op bariatric recommendations:  -eating 3 meals daily  -eating protein first, getting >60gm protein daily  -eating slowly, chewing food well  -avoiding/limiting calorie containing beverages  -drinking water 15-30 minutes before or after meals  -choosing wheat, not white with breads, crackers, pastas, doretha, bagels, tortillas, rice  -limiting restaurant or cafeteria eating to twice a week or less    We discussed the importance of restorative sleep and stress management in maintaining a healthy weight.  We discussed the National Weight Control Registry healthy weight maintenance strategies and ways to optimize metabolism.  We discussed the importance of physical activity including cardiovascular and strength training in maintaining a healthier weight.    We discussed the importance of life-long vitamin supplementation and life-long  follow-up.    Carolina was reminded that, to avoid marginal ulcers she should avoid tobacco at all, alcohol in excess, caffeine in excess, and NSAIDS (unless indicated for cardioprotection or othewise and opposed by a PPI).    Maria De Jesus Taveras MD, MD, FAAFP  [facility].  7/2/2025  3:10 PM            Total time spent on the date of this encounter doing: chart review, review of test results, patient visit, physical exam, education, counseling, developing plan of care, and documenting = 60 minutes.    Again, thank you for allowing me to participate in the care of your " patient.        Sincerely,        Maria De Jesus Taveras MD    Electronically signed

## 2025-07-07 ENCOUNTER — LAB (OUTPATIENT)
Dept: LAB | Facility: CLINIC | Age: 80
End: 2025-07-07
Payer: COMMERCIAL

## 2025-07-07 LAB
ANNOTATION COMMENT IMP: NORMAL
RETINYL PALMITATE SERPL-MCNC: <0.02 MG/L
VIT A SERPL-MCNC: 0.52 MG/L

## 2025-07-12 ENCOUNTER — LAB (OUTPATIENT)
Dept: LAB | Facility: CLINIC | Age: 80
End: 2025-07-12
Payer: COMMERCIAL

## 2025-07-12 DIAGNOSIS — K74.60 CIRRHOSIS (H): ICD-10-CM

## 2025-07-12 DIAGNOSIS — K91.2 POSTOPERATIVE MALABSORPTION: ICD-10-CM

## 2025-07-12 DIAGNOSIS — K74.60 LIVER CIRRHOSIS SECONDARY TO NONALCOHOLIC STEATOHEPATITIS (NASH) (H): ICD-10-CM

## 2025-07-12 DIAGNOSIS — K75.81 LIVER CIRRHOSIS SECONDARY TO NONALCOHOLIC STEATOHEPATITIS (NASH) (H): ICD-10-CM

## 2025-07-12 DIAGNOSIS — K90.9 INTESTINAL MALABSORPTION, UNSPECIFIED TYPE: ICD-10-CM

## 2025-07-12 DIAGNOSIS — M35.00 SJOGREN'S SYNDROME, WITH UNSPECIFIED ORGAN INVOLVEMENT: ICD-10-CM

## 2025-07-12 LAB
AFP SERPL-MCNC: 3.5 NG/ML
ALBUMIN SERPL BCG-MCNC: 4.1 G/DL (ref 3.5–5.2)
ALP SERPL-CCNC: 90 U/L (ref 40–150)
ALT SERPL W P-5'-P-CCNC: 62 U/L (ref 0–50)
AST SERPL W P-5'-P-CCNC: 63 U/L (ref 0–45)
BILIRUB SERPL-MCNC: 1 MG/DL
BILIRUBIN DIRECT (ROCHE PRO & PURE): 0.42 MG/DL (ref 0–0.45)
CREAT SERPL-MCNC: 0.94 MG/DL (ref 0.51–0.95)
CRP SERPL-MCNC: <3 MG/L
EGFRCR SERPLBLD CKD-EPI 2021: 62 ML/MIN/1.73M2
ERYTHROCYTE [DISTWIDTH] IN BLOOD BY AUTOMATED COUNT: 13.2 % (ref 10–15)
ERYTHROCYTE [SEDIMENTATION RATE] IN BLOOD BY WESTERGREN METHOD: 41 MM/HR (ref 0–30)
FOLATE SERPL-MCNC: 10.5 NG/ML (ref 4.6–34.8)
HCT VFR BLD AUTO: 37.5 % (ref 35–47)
HGB BLD-MCNC: 13 G/DL (ref 11.7–15.7)
MCH RBC QN AUTO: 33.3 PG (ref 26.5–33)
MCHC RBC AUTO-ENTMCNC: 34.7 G/DL (ref 31.5–36.5)
MCV RBC AUTO: 96 FL (ref 78–100)
PLATELET # BLD AUTO: 131 10E3/UL (ref 150–450)
PROT SERPL-MCNC: 7.3 G/DL (ref 6.4–8.3)
PTH-INTACT SERPL-MCNC: 61 PG/ML (ref 15–65)
RBC # BLD AUTO: 3.9 10E6/UL (ref 3.8–5.2)
VIT B12 SERPL-MCNC: 407 PG/ML (ref 232–1245)
WBC # BLD AUTO: 6.4 10E3/UL (ref 4–11)

## 2025-07-12 PROCEDURE — 84630 ASSAY OF ZINC: CPT | Mod: 90

## 2025-07-12 PROCEDURE — 83970 ASSAY OF PARATHORMONE: CPT

## 2025-07-12 PROCEDURE — 86225 DNA ANTIBODY NATIVE: CPT

## 2025-07-12 PROCEDURE — 82105 ALPHA-FETOPROTEIN SERUM: CPT

## 2025-07-12 PROCEDURE — 85610 PROTHROMBIN TIME: CPT

## 2025-07-12 PROCEDURE — 86160 COMPLEMENT ANTIGEN: CPT

## 2025-07-12 PROCEDURE — 85027 COMPLETE CBC AUTOMATED: CPT

## 2025-07-12 PROCEDURE — 84425 ASSAY OF VITAMIN B-1: CPT | Mod: 90

## 2025-07-12 PROCEDURE — 86140 C-REACTIVE PROTEIN: CPT

## 2025-07-12 PROCEDURE — 36415 COLL VENOUS BLD VENIPUNCTURE: CPT

## 2025-07-12 PROCEDURE — 82565 ASSAY OF CREATININE: CPT

## 2025-07-12 PROCEDURE — 85652 RBC SED RATE AUTOMATED: CPT

## 2025-07-12 PROCEDURE — 80076 HEPATIC FUNCTION PANEL: CPT

## 2025-07-12 PROCEDURE — 82607 VITAMIN B-12: CPT

## 2025-07-12 PROCEDURE — 99000 SPECIMEN HANDLING OFFICE-LAB: CPT

## 2025-07-12 PROCEDURE — 82746 ASSAY OF FOLIC ACID SERUM: CPT

## 2025-07-13 LAB
INR PPP: 1.04 (ref 0.85–1.15)
PROTHROMBIN TIME: 13.9 SECONDS (ref 11.8–14.8)

## 2025-07-14 LAB
C3 SERPL-MCNC: 56 MG/DL (ref 81–157)
DSDNA AB SER-ACNC: 9.1 IU/ML

## 2025-07-15 LAB — ZINC SERPL-MCNC: 64.8 UG/DL

## 2025-07-15 NOTE — PROGRESS NOTES
RETURN PATIENT RHEUMATOLOGY VISIT     Assessment & Plan     Problem List    HTN  Atrophic gastritic  Osteoarthritis       Systemic lupus erythematosus  Sjogren's Disease   Comment: THEE: 1:2560, speckled pattern, +SSA/+SSB, +RNP, hypocomplementemia, cutaneous lupus, vasculitic appearing rash, SICCA symptoms.   History of unprovoked DVT 3/2024, APS antibodies at the time were negative and neg again on check 10/2024    With recent lupus activity labs from this month are stable with chronic thrombocytopenia, normal crp and dsDNA, no proteinuria or hematuria, stable mildly elevated liver enzymes. Stable complements, mildly low C3. Clinically without signs of SLE flare       Plan:   -continue plaquenil 400mg daily   -continue cevimeline 30mg 1-3x daily   -is on prescription eye drops prescribed by optho   -is on Imuran and prednisone for autoimmune hepatitis   -disease activity labs every 3-4 months      Long Term Plaquenil   Comment: Reports she started Plaquenil only a few months ago  S/p eye exam at [facility] 12/2024   Plan:   -yearly plaquenil eye exams    Autoimmune Hepatitis with Cirrhosis   Comment: Is followed by [facility] and is currently on imuran and prednisone 10mg daily   Plan:   -Continue to follow with [facility]    Mild restriction on PFTs  Comment:  Previous PFTs showed mild restriction, normal CT chest, (1/2025) is being followed by pulmonology and felt to be likely related to ascites and cirrhosis with recommendation for yearly PFTs.   Plan:  -  Last saw pulm 1/2025; missed March appointment. Recommend scheduling follow up. Last PFTs 12/2024 with mild restriction and reduced DLCO, feels her breathing has worsened, will repeat PFTs at this time.     Osteoarthritis  Comment: Signs of osteoarthritis over her hands, knees, shoulders  - Left shoulder steroid injection  5/5/2025, now improved  Plan:  - Discussed option of intra-articular steroid injection for Dexter node with hand Ortho, reports her pain is  only mild at this time and will hold off  - Recommended physical therapy for shoulder and knees, currently not interested  - Recommend she follow-up with her orthopedic surgeon for her bilateral knee pain status post bilateral knee replacements      Orders Placed This Encounter   Procedures    DNA double stranded antibodies    Complement C3    Complement C4    Erythrocyte sedimentation rate auto    CRP inflammation    ALT    AST    Creatinine    CBC with platelets    Protein  random urine    UA with Microscopic reflex to Culture    6 minute walk test    PFT General Lab Testing (Please always keep checked)    Pulmonary Function Test        30 minutes spent on the day of the encounter doing chart review, history and exam, counseling and documentation, not including time of procedure.     The longitudinal plan of care for the diagnosis(es)/condition(s) as documented were addressed during this visit not including time for procedure. Due to the added complexity in care, I will continue to support Adelina in the subsequent management and with ongoing continuity of care.    RTC in 4 months     Subjective     Shoulder no longer bothering her after injection   Knees also better  Feels her breathing is a bit worse, coughing some more,  no showed her pulm appointment as wasn't aware she had one.   Otherwise feeling well.       HPI    Patient presents to establish care for Sjogren's/Lupus overlap.  Her diagnosis is based on positive THEE: 1:2560, speckled pattern, +SSA/+SSB, +RNP, hypocomplementemia, and cutaneous lupus. Was previously seen by Saranya Law last seen 3/2024.  She was also seen by rheumatology at [facility] last seen also 3/2024.  Patient is a poor historian but per review her last note from [facility], it appears that when she was seen in March she had been having worsening rash for the last year and was not on any medications for her lupus.  Reportedly she had tried Plaquenil 5 years prior but discontinued it after  a week due to shortness of breath.  It appears that her recurrent rash has been managed with intermittent steroids per dermatology with improvement, and that biopsy confirmed SCLE.  However I do not have these records at this time.  She had also been noted to have elevated liver enzymes and underwent liver biopsy in 2021 that demonstrated mild steatosis and hepatocyte ballooning without features of autoimmune hepatitis or other chronic liver disease. Stage IV fibrosis was noted. Anti smooth muscle antibody and antimitochondrial antibody have been negative.Hepatitis testing has also been negative.  She currently follows with [facility] and is on Imuran and prednisone 20 mg daily for presumed autoimmune hepatitis given improvement of her transaminitis with steroids.    Per my discussion with Ms. García today, she reports she was diagnosed with lupus initially over 10 years ago after developing skin rash after prolonged sun exposure during a trip to [place].     She denies any history of oral ulcers, does get arthralgias over her hands, and she continues to have photosensitivity with flares of rash after sun exposure.  She shows me a picture on her phone from 2023 which reveals erythematous plaques with scale over her arms and chest consistent with SCLE.   No raynaud's, no oral or nasal ulcers, no hx of inflammatory eye disease, no +GERD on omeprazole, +dyspnea - had a recent stress test due to this which was normal, occasional cough, no chest pain, no fevers or weigh loss, no pleurisy   Has a history of a blood clot in her leg 2024 s/p 3 months of blood thinner, no history of miscarriages, 3 kids   +dry eyes +dry mouth, no numbness or tingling  No salivary gland swelling    No family  Hx of known autoimmune disease  Not a previous smoker. Not a current smoker.      Lab and Imaging review:    I reviewed recent labs and imaging includin/12/25  Normal Cr, mildly elevated ALT 62, Ast 63,   Normal  Crp, esr 41   CBC with low plts 131   C3 low at 56, normal C4   Normal dsDNA  No proteinuria or hematuria     4/1/25 normal creatinine, ALT, AST mildly elevated 56, normal CRP (previously 24) and ESR, CBC with leukopenia to 132 and lymphopenia to 0.4  Urinalysis without protein or blood  C3 low at 61 (stable), normal C4, dsDNA very mildly high at 10    11/25/2024 normal creatinine, CBC with low platelets at 137  10/2024 no significant proteinuria,  C4 normalized, C3 low at 62 but improved from prior, dsDNA within normal limits, normal ESR and CRP  Negative cardiolipin antibody, beta-2 glycoprotein, lupus anticoagulant,    2/2024 [facility] labs through care everywhere positive THEE 1: 2560, SSA over 8, SSB over 8, RNP over 8, negative SCL 70, Sharee 1, dsDNA, Lim,    3/2024 negative smooth muscle antibody, negative mitochondrial antibody, normal beta-2 glycoprotein, cardiolipin antibodies, and lupus anticoagulant      Current Outpatient Medications:     amLODIPine (NORVASC) 5 MG tablet, TAKE 1 TABLET BY MOUTH EVERY DAY, Disp: 90 tablet, Rfl: 2    baclofen (LIORESAL) 10 MG tablet, TAKE 0.5-1 TABLETS (5-10 MG) BY MOUTH 2 TIMES DAILY AS NEEDED FOR MUSCLE SPASMS, Disp: 180 tablet, Rfl: 1    cevimeline (EVOXAC) 30 MG capsule, TAKE 1 CAPSULE (30 MG) BY MOUTH 3 TIMES DAILY., Disp: 270 capsule, Rfl: 1    citalopram (CELEXA) 20 MG tablet, TAKE 1 & 1/2 TABLETS BY MOUTH ONCE A DAY, Disp: 135 tablet, Rfl: 1    fluocinonide (LIDEX) 0.05 % external cream, Apply topically 2 times daily., Disp: , Rfl:     furosemide (LASIX) 20 MG tablet, TAKE 2 TABLETS (40 MG) BY MOUTH EVERY MORNING, Disp: 180 tablet, Rfl: 2    loratadine (CLARITIN) 10 mg tablet, Take 10 mg by mouth daily., Disp: , Rfl:     losartan (COZAAR) 100 MG tablet, TAKE 1 TABLET BY MOUTH EVERY DAY, Disp: 90 tablet, Rfl: 2    OMEGA-3/DHA/EPA/FISH OIL (FISH OIL-OMEGA-3 FATTY ACIDS) 300-1,000 mg capsule, Take 2 g by mouth daily., Disp: , Rfl:     omeprazole (PRILOSEC) 20 MG  capsule, [OMEPRAZOLE (PRILOSEC) 20 MG CAPSULE] Take 2 capsules (40 mg total) by mouth 2 (two) times a day before meals., Disp: 120 capsule, Rfl: 1    predniSONE (DELTASONE) 2.5 MG tablet, Take 2.5 mg by mouth daily., Disp: , Rfl:     spironolactone (ALDACTONE) 25 MG tablet, Take 50 mg by mouth daily., Disp: , Rfl:     triamcinolone (KENALOG) 0.1 % external cream, 1 APPLICATION DAILY TOPICALLY APPLY TO LEG AS NEEDED FOR RASH FOR TWO WEEKS, THEN REST ONE WEEK, Disp: , Rfl:     azaTHIOprine 100 MG TABS, Take 1 tablet by mouth daily., Disp: , Rfl:     hydroxychloroquine (PLAQUENIL) 200 MG tablet, TAKE 1 TABLET (200 MG) BY MOUTH DAILY AT 2 PM., Disp: 90 tablet, Rfl: 1    Current Facility-Administered Medications:     ceFAZolin (ANCEF) 2 g in sodium chloride 0.9 % 100 mL intermittent infusion, 2 g, Intravenous, Q8H, Leonel Arrington DO, Last Rate: 200 mL/hr at 12/04/23 1052, 2 g at 12/04/23 1052  Allergies:  Allergies   Allergen Reactions    Celecoxib GI Disturbance    Codeine Unknown     Emesis, tired    Egg White [Egg White (Egg Protein)] Unknown     Dumping syndrome     Medical Hx:  Past Medical History:   Diagnosis Date    GERD (gastroesophageal reflux disease)     Hepatic cirrhosis (H)     Hypertension     Low bone mass     Neck pain     Osteoarthritis 01/13/2016    Postoperative malabsorption     Systemic lupus (H)      Surgical Hx:  Past Surgical History:   Procedure Laterality Date    ARTHROPLASTY KNEE BILATERAL      BIOPSY BREAST Right 01/01/2005    benign    CV CORONARY ANGIOGRAM N/A 08/22/2019    Procedure: Coronary Angiogram;  Surgeon: Melissa Vasquez MD;  Location: [facility] Cath Lab;  Service: Cardiology    CV LEFT HEART CATHETERIZATION WITHOUT LEFT VENTRICULOGRAM Left 08/22/2019    Procedure: Left Heart Catheterization Without Left Ventriculogram;  Surgeon: Melissa Vasquez MD;  Location: [facility] Cath Lab;  Service: Cardiology    FISSURECTOMY RECTUM      HC REMOVAL GALLBLADDER      Description:  Cholecystectomy;  Recorded: 04/16/2012;    HYSTERECTOMY  01/01/1986    FL TOT DISC ARTHRP ART DISC ANT APPRO 1 [facility] CRV N/A 06/17/2015    Procedure:  C45 MOBI-C DISC ARTHORPLASTY, ATTEMPT MOBI C C56, ANTERIOR CERVICAL DISCECTOMY AND FUSION IF UNABLE TO PLACE ;  Surgeon: Korina Beltrán MD;  Location: [facility];  Service: Spine    ZZC GASTROPLASTY,OBESITY,OTHER      Description: Gastric Surgery For Morbid Obesity Gastric Stapling;  Recorded: 04/16/2012;     Family Hx:  Family History   Problem Relation Age of Onset    Obesity Mother     Breast Cancer Sister 52    Cancer Sister     Breast Cancer Sister 60     Social Hx:  Social History     Tobacco Use    Smoking status: Never     Passive exposure: Past    Smokeless tobacco: Never   Vaping Use    Vaping status: Never Used   Substance Use Topics    Alcohol use: Not Currently     Comment: Alcoholic Drinks/day: glass of wine on occasion    Drug use: No          Objective   Physical Exam   /68 (BP Location: Right arm)   Pulse 90   Wt 84.8 kg (187 lb)   SpO2 94%   BMI 29.29 kg/m    General: alert, well appearing, no distress  HEENT:  clear conjunctiva, no facial rash   Cardiac: Warm and well-perfused  Pulm: normal respiratory effort, CTABL                               Denise Mchugh MD  Rheumatology

## 2025-07-16 LAB — VIT B1 PYROPHOSHATE BLD-SCNC: 126 NMOL/L

## 2025-07-17 ENCOUNTER — OFFICE VISIT (OUTPATIENT)
Dept: RHEUMATOLOGY | Facility: CLINIC | Age: 80
End: 2025-07-17
Payer: COMMERCIAL

## 2025-07-17 VITALS
OXYGEN SATURATION: 94 % | SYSTOLIC BLOOD PRESSURE: 118 MMHG | WEIGHT: 187 LBS | HEART RATE: 90 BPM | DIASTOLIC BLOOD PRESSURE: 68 MMHG | BODY MASS INDEX: 29.29 KG/M2

## 2025-07-17 DIAGNOSIS — M35.00 SJOGREN'S SYNDROME, WITH UNSPECIFIED ORGAN INVOLVEMENT: Primary | ICD-10-CM

## 2025-08-27 ENCOUNTER — NURSE TRIAGE (OUTPATIENT)
Dept: FAMILY MEDICINE | Facility: CLINIC | Age: 80
End: 2025-08-27
Payer: COMMERCIAL

## 2025-08-28 ENCOUNTER — OFFICE VISIT (OUTPATIENT)
Dept: INTERNAL MEDICINE | Facility: CLINIC | Age: 80
End: 2025-08-28
Payer: COMMERCIAL

## 2025-08-28 VITALS
BODY MASS INDEX: 29.57 KG/M2 | HEART RATE: 85 BPM | TEMPERATURE: 98 F | RESPIRATION RATE: 22 BRPM | WEIGHT: 188.4 LBS | SYSTOLIC BLOOD PRESSURE: 134 MMHG | OXYGEN SATURATION: 97 % | HEIGHT: 67 IN | DIASTOLIC BLOOD PRESSURE: 78 MMHG

## 2025-08-28 DIAGNOSIS — M25.511 RIGHT SHOULDER PAIN, UNSPECIFIED CHRONICITY: Primary | ICD-10-CM

## 2025-08-28 DIAGNOSIS — I10 ESSENTIAL HYPERTENSION: ICD-10-CM

## 2025-08-28 ASSESSMENT — PAIN SCALES - GENERAL: PAINLEVEL_OUTOF10: NO PAIN (0)

## 2025-09-04 ENCOUNTER — PATIENT OUTREACH (OUTPATIENT)
Dept: CARE COORDINATION | Facility: CLINIC | Age: 80
End: 2025-09-04
Payer: COMMERCIAL